# Patient Record
Sex: MALE | Race: WHITE | NOT HISPANIC OR LATINO | Employment: OTHER | URBAN - METROPOLITAN AREA
[De-identification: names, ages, dates, MRNs, and addresses within clinical notes are randomized per-mention and may not be internally consistent; named-entity substitution may affect disease eponyms.]

---

## 2022-08-09 ENCOUNTER — APPOINTMENT (OUTPATIENT)
Dept: GENERAL RADIOLOGY | Facility: HOSPITAL | Age: 87
End: 2022-08-09

## 2022-08-09 ENCOUNTER — HOSPITAL ENCOUNTER (INPATIENT)
Facility: HOSPITAL | Age: 87
LOS: 11 days | Discharge: HOME HEALTH | End: 2022-08-20
Attending: INTERNAL MEDICINE | Admitting: INTERNAL MEDICINE

## 2022-08-09 DIAGNOSIS — J96.01 ACUTE HYPOXEMIC RESPIRATORY FAILURE DUE TO COVID-19: Primary | ICD-10-CM

## 2022-08-09 DIAGNOSIS — U07.1 ACUTE HYPOXEMIC RESPIRATORY FAILURE DUE TO COVID-19: Primary | ICD-10-CM

## 2022-08-09 DIAGNOSIS — N17.9 ACUTE RENAL FAILURE, UNSPECIFIED ACUTE RENAL FAILURE TYPE: ICD-10-CM

## 2022-08-09 PROBLEM — E87.20 METABOLIC ACIDOSIS: Status: ACTIVE | Noted: 2022-08-08

## 2022-08-09 PROBLEM — R19.7 DIARRHEA OF PRESUMED INFECTIOUS ORIGIN: Status: ACTIVE | Noted: 2022-08-09

## 2022-08-09 PROBLEM — J10.1 INFLUENZA DUE TO INFLUENZA VIRUS, TYPE B: Status: RESOLVED | Noted: 2022-08-08 | Resolved: 2022-08-09

## 2022-08-09 PROBLEM — K92.1 HEMATOCHEZIA: Status: ACTIVE | Noted: 2022-08-08

## 2022-08-09 PROBLEM — R77.8 ELEVATED TROPONIN: Status: ACTIVE | Noted: 2022-08-09

## 2022-08-09 PROBLEM — J10.1 INFLUENZA DUE TO INFLUENZA VIRUS, TYPE B: Status: ACTIVE | Noted: 2022-08-08

## 2022-08-09 LAB
ADV 40+41 DNA STL QL NAA+NON-PROBE: NOT DETECTED
ALBUMIN SERPL-MCNC: 3.7 G/DL (ref 3.5–5.2)
ALBUMIN/GLOB SERPL: 1.4 G/DL
ALP SERPL-CCNC: 80 U/L (ref 39–117)
ALT SERPL W P-5'-P-CCNC: 36 U/L (ref 1–41)
ANION GAP SERPL CALCULATED.3IONS-SCNC: 12 MMOL/L (ref 5–15)
ANION GAP SERPL CALCULATED.3IONS-SCNC: 16 MMOL/L (ref 5–15)
APTT PPP: 32.9 SECONDS (ref 24–31)
AST SERPL-CCNC: 44 U/L (ref 1–40)
ASTRO TYP 1-8 RNA STL QL NAA+NON-PROBE: NOT DETECTED
B PARAPERT DNA SPEC QL NAA+PROBE: NOT DETECTED
B PERT DNA SPEC QL NAA+PROBE: NOT DETECTED
BACTERIA UR QL AUTO: ABNORMAL /HPF
BASOPHILS # BLD AUTO: 0 10*3/MM3 (ref 0–0.2)
BASOPHILS NFR BLD AUTO: 0.3 % (ref 0–1.5)
BILIRUB SERPL-MCNC: 0.3 MG/DL (ref 0–1.2)
BILIRUB UR QL STRIP: NEGATIVE
BUN SERPL-MCNC: 95 MG/DL (ref 8–23)
BUN SERPL-MCNC: 95 MG/DL (ref 8–23)
BUN/CREAT SERPL: 21.4 (ref 7–25)
BUN/CREAT SERPL: 23.3 (ref 7–25)
C CAYETANENSIS DNA STL QL NAA+NON-PROBE: NOT DETECTED
C COLI+JEJ+UPSA DNA STL QL NAA+NON-PROBE: NOT DETECTED
C DIFF GDH STL QL: NEGATIVE
C PNEUM DNA NPH QL NAA+NON-PROBE: NOT DETECTED
CA-I SERPL ISE-MCNC: 1.21 MMOL/L (ref 1.2–1.3)
CALCIUM SPEC-SCNC: 8.4 MG/DL (ref 8.6–10.5)
CALCIUM SPEC-SCNC: 8.6 MG/DL (ref 8.6–10.5)
CHLORIDE SERPL-SCNC: 107 MMOL/L (ref 98–107)
CHLORIDE SERPL-SCNC: 111 MMOL/L (ref 98–107)
CHOLEST SERPL-MCNC: 133 MG/DL (ref 0–200)
CK SERPL-CCNC: 168 U/L (ref 20–200)
CLARITY UR: CLEAR
CO2 SERPL-SCNC: 16 MMOL/L (ref 22–29)
CO2 SERPL-SCNC: 19 MMOL/L (ref 22–29)
COLOR UR: YELLOW
CREAT SERPL-MCNC: 4.08 MG/DL (ref 0.76–1.27)
CREAT SERPL-MCNC: 4.44 MG/DL (ref 0.76–1.27)
CRP SERPL-MCNC: 2.67 MG/DL (ref 0–0.5)
CRYPTOSP DNA STL QL NAA+NON-PROBE: NOT DETECTED
D-LACTATE SERPL-SCNC: 0.8 MMOL/L (ref 0.5–2)
D-LACTATE SERPL-SCNC: 1.3 MMOL/L (ref 0.5–2)
DEPRECATED RDW RBC AUTO: 47.7 FL (ref 37–54)
E HISTOLYT DNA STL QL NAA+NON-PROBE: NOT DETECTED
EAEC PAA PLAS AGGR+AATA ST NAA+NON-PRB: NOT DETECTED
EC STX1+STX2 GENES STL QL NAA+NON-PROBE: NOT DETECTED
EGFRCR SERPLBLD CKD-EPI 2021: 12.3 ML/MIN/1.73
EGFRCR SERPLBLD CKD-EPI 2021: 13.6 ML/MIN/1.73
EOSINOPHIL # BLD AUTO: 0 10*3/MM3 (ref 0–0.4)
EOSINOPHIL NFR BLD AUTO: 0 % (ref 0.3–6.2)
EPEC EAE GENE STL QL NAA+NON-PROBE: NOT DETECTED
ERYTHROCYTE [DISTWIDTH] IN BLOOD BY AUTOMATED COUNT: 14.7 % (ref 12.3–15.4)
ETEC LTA+ST1A+ST1B TOX ST NAA+NON-PROBE: NOT DETECTED
FLUAV SUBTYP SPEC NAA+PROBE: NOT DETECTED
FLUBV RNA ISLT QL NAA+PROBE: NOT DETECTED
G LAMBLIA DNA STL QL NAA+NON-PROBE: NOT DETECTED
GLOBULIN UR ELPH-MCNC: 2.7 GM/DL
GLUCOSE BLDC GLUCOMTR-MCNC: 198 MG/DL (ref 70–105)
GLUCOSE BLDC GLUCOMTR-MCNC: 217 MG/DL (ref 70–105)
GLUCOSE BLDC GLUCOMTR-MCNC: 285 MG/DL (ref 70–105)
GLUCOSE BLDC GLUCOMTR-MCNC: 291 MG/DL (ref 70–105)
GLUCOSE SERPL-MCNC: 265 MG/DL (ref 65–99)
GLUCOSE SERPL-MCNC: 316 MG/DL (ref 65–99)
GLUCOSE UR STRIP-MCNC: ABNORMAL MG/DL
HADV DNA SPEC NAA+PROBE: NOT DETECTED
HCOV 229E RNA SPEC QL NAA+PROBE: NOT DETECTED
HCOV HKU1 RNA SPEC QL NAA+PROBE: NOT DETECTED
HCOV NL63 RNA SPEC QL NAA+PROBE: NOT DETECTED
HCOV OC43 RNA SPEC QL NAA+PROBE: NOT DETECTED
HCT VFR BLD AUTO: 35.3 % (ref 37.5–51)
HDLC SERPL-MCNC: 24 MG/DL (ref 40–60)
HGB BLD-MCNC: 11.7 G/DL (ref 13–17.7)
HGB UR QL STRIP.AUTO: ABNORMAL
HMPV RNA NPH QL NAA+NON-PROBE: NOT DETECTED
HPIV1 RNA ISLT QL NAA+PROBE: NOT DETECTED
HPIV2 RNA SPEC QL NAA+PROBE: NOT DETECTED
HPIV3 RNA NPH QL NAA+PROBE: NOT DETECTED
HPIV4 P GENE NPH QL NAA+PROBE: NOT DETECTED
HYALINE CASTS UR QL AUTO: ABNORMAL /LPF
INR PPP: 1.21 (ref 0.93–1.1)
KETONES UR QL STRIP: NEGATIVE
LDLC SERPL CALC-MCNC: 72 MG/DL (ref 0–100)
LDLC/HDLC SERPL: 2.7 {RATIO}
LEUKOCYTE ESTERASE UR QL STRIP.AUTO: ABNORMAL
LYMPHOCYTES # BLD AUTO: 0.6 10*3/MM3 (ref 0.7–3.1)
LYMPHOCYTES NFR BLD AUTO: 11.4 % (ref 19.6–45.3)
M PNEUMO IGG SER IA-ACNC: NOT DETECTED
MAGNESIUM SERPL-MCNC: 1.8 MG/DL (ref 1.6–2.4)
MCH RBC QN AUTO: 30.3 PG (ref 26.6–33)
MCHC RBC AUTO-ENTMCNC: 33.1 G/DL (ref 31.5–35.7)
MCV RBC AUTO: 91.4 FL (ref 79–97)
MONOCYTES # BLD AUTO: 0.2 10*3/MM3 (ref 0.1–0.9)
MONOCYTES NFR BLD AUTO: 3.7 % (ref 5–12)
MRSA DNA SPEC QL NAA+PROBE: ABNORMAL
NEUTROPHILS NFR BLD AUTO: 4.6 10*3/MM3 (ref 1.7–7)
NEUTROPHILS NFR BLD AUTO: 84.6 % (ref 42.7–76)
NITRITE UR QL STRIP: NEGATIVE
NOROVIRUS GI+II RNA STL QL NAA+NON-PROBE: NOT DETECTED
NRBC BLD AUTO-RTO: 0.1 /100 WBC (ref 0–0.2)
P SHIGELLOIDES DNA STL QL NAA+NON-PROBE: NOT DETECTED
PH UR STRIP.AUTO: <=5 [PH] (ref 5–8)
PHOSPHATE SERPL-MCNC: 5 MG/DL (ref 2.5–4.5)
PLATELET # BLD AUTO: 196 10*3/MM3 (ref 140–450)
PMV BLD AUTO: 9.6 FL (ref 6–12)
POTASSIUM SERPL-SCNC: 4.6 MMOL/L (ref 3.5–5.2)
POTASSIUM SERPL-SCNC: 5 MMOL/L (ref 3.5–5.2)
PROCALCITONIN SERPL-MCNC: 0.28 NG/ML (ref 0–0.25)
PROT SERPL-MCNC: 6.4 G/DL (ref 6–8.5)
PROT UR QL STRIP: ABNORMAL
PROTHROMBIN TIME: 12.3 SECONDS (ref 9.6–11.7)
RBC # BLD AUTO: 3.86 10*6/MM3 (ref 4.14–5.8)
RBC # UR STRIP: ABNORMAL /HPF
REF LAB TEST METHOD: ABNORMAL
RHINOVIRUS RNA SPEC NAA+PROBE: NOT DETECTED
RSV RNA NPH QL NAA+NON-PROBE: NOT DETECTED
RVA RNA STL QL NAA+NON-PROBE: NOT DETECTED
S ENT+BONG DNA STL QL NAA+NON-PROBE: NOT DETECTED
SAPO I+II+IV+V RNA STL QL NAA+NON-PROBE: NOT DETECTED
SARS-COV-2 RNA NPH QL NAA+NON-PROBE: DETECTED
SHIGELLA SP+EIEC IPAH ST NAA+NON-PROBE: NOT DETECTED
SODIUM SERPL-SCNC: 139 MMOL/L (ref 136–145)
SODIUM SERPL-SCNC: 142 MMOL/L (ref 136–145)
SODIUM UR-SCNC: 71 MMOL/L
SP GR UR STRIP: 1.01 (ref 1–1.03)
SQUAMOUS #/AREA URNS HPF: ABNORMAL /HPF
TRIGL SERPL-MCNC: 221 MG/DL (ref 0–150)
TROPONIN T SERPL-MCNC: 0.05 NG/ML (ref 0–0.03)
TROPONIN T SERPL-MCNC: 0.06 NG/ML (ref 0–0.03)
TROPONIN T SERPL-MCNC: 0.07 NG/ML (ref 0–0.03)
UROBILINOGEN UR QL STRIP: ABNORMAL
V CHOL+PARA+VUL DNA STL QL NAA+NON-PROBE: NOT DETECTED
V CHOLERAE DNA STL QL NAA+NON-PROBE: NOT DETECTED
VLDLC SERPL-MCNC: 37 MG/DL (ref 5–40)
WBC # UR STRIP: ABNORMAL /HPF
WBC NRBC COR # BLD: 5.5 10*3/MM3 (ref 3.4–10.8)
Y ENTEROCOL DNA STL QL NAA+NON-PROBE: NOT DETECTED

## 2022-08-09 PROCEDURE — 0202U NFCT DS 22 TRGT SARS-COV-2: CPT | Performed by: NURSE PRACTITIONER

## 2022-08-09 PROCEDURE — 84300 ASSAY OF URINE SODIUM: CPT | Performed by: NURSE PRACTITIONER

## 2022-08-09 PROCEDURE — 85025 COMPLETE CBC W/AUTO DIFF WBC: CPT | Performed by: NURSE PRACTITIONER

## 2022-08-09 PROCEDURE — 87040 BLOOD CULTURE FOR BACTERIA: CPT | Performed by: NURSE PRACTITIONER

## 2022-08-09 PROCEDURE — 85610 PROTHROMBIN TIME: CPT | Performed by: NURSE PRACTITIONER

## 2022-08-09 PROCEDURE — 84100 ASSAY OF PHOSPHORUS: CPT | Performed by: NURSE PRACTITIONER

## 2022-08-09 PROCEDURE — 83605 ASSAY OF LACTIC ACID: CPT | Performed by: INTERNAL MEDICINE

## 2022-08-09 PROCEDURE — 80061 LIPID PANEL: CPT | Performed by: NURSE PRACTITIONER

## 2022-08-09 PROCEDURE — 82962 GLUCOSE BLOOD TEST: CPT

## 2022-08-09 PROCEDURE — 83605 ASSAY OF LACTIC ACID: CPT | Performed by: NURSE PRACTITIONER

## 2022-08-09 PROCEDURE — 87449 NOS EACH ORGANISM AG IA: CPT | Performed by: NURSE PRACTITIONER

## 2022-08-09 PROCEDURE — 84145 PROCALCITONIN (PCT): CPT | Performed by: NURSE PRACTITIONER

## 2022-08-09 PROCEDURE — 71045 X-RAY EXAM CHEST 1 VIEW: CPT

## 2022-08-09 PROCEDURE — 85730 THROMBOPLASTIN TIME PARTIAL: CPT | Performed by: NURSE PRACTITIONER

## 2022-08-09 PROCEDURE — 84484 ASSAY OF TROPONIN QUANT: CPT | Performed by: NURSE PRACTITIONER

## 2022-08-09 PROCEDURE — 82330 ASSAY OF CALCIUM: CPT | Performed by: NURSE PRACTITIONER

## 2022-08-09 PROCEDURE — 63710000001 INSULIN LISPRO (HUMAN) PER 5 UNITS: Performed by: INTERNAL MEDICINE

## 2022-08-09 PROCEDURE — 87324 CLOSTRIDIUM AG IA: CPT | Performed by: NURSE PRACTITIONER

## 2022-08-09 PROCEDURE — 82550 ASSAY OF CK (CPK): CPT | Performed by: NURSE PRACTITIONER

## 2022-08-09 PROCEDURE — 99233 SBSQ HOSP IP/OBS HIGH 50: CPT | Performed by: NURSE PRACTITIONER

## 2022-08-09 PROCEDURE — 63710000001 INSULIN LISPRO (HUMAN) PER 5 UNITS: Performed by: NURSE PRACTITIONER

## 2022-08-09 PROCEDURE — 87641 MR-STAPH DNA AMP PROBE: CPT | Performed by: NURSE PRACTITIONER

## 2022-08-09 PROCEDURE — 63710000001 DEXAMETHASONE PER 0.25 MG: Performed by: INTERNAL MEDICINE

## 2022-08-09 PROCEDURE — 83735 ASSAY OF MAGNESIUM: CPT | Performed by: NURSE PRACTITIONER

## 2022-08-09 PROCEDURE — 80053 COMPREHEN METABOLIC PANEL: CPT | Performed by: NURSE PRACTITIONER

## 2022-08-09 PROCEDURE — 93005 ELECTROCARDIOGRAM TRACING: CPT | Performed by: NURSE PRACTITIONER

## 2022-08-09 PROCEDURE — 93010 ELECTROCARDIOGRAM REPORT: CPT | Performed by: INTERNAL MEDICINE

## 2022-08-09 PROCEDURE — 81001 URINALYSIS AUTO W/SCOPE: CPT | Performed by: NURSE PRACTITIONER

## 2022-08-09 PROCEDURE — 87507 IADNA-DNA/RNA PROBE TQ 12-25: CPT | Performed by: NURSE PRACTITIONER

## 2022-08-09 PROCEDURE — 25010000002 ENOXAPARIN PER 10 MG: Performed by: NURSE PRACTITIONER

## 2022-08-09 PROCEDURE — 86140 C-REACTIVE PROTEIN: CPT | Performed by: NURSE PRACTITIONER

## 2022-08-09 RX ORDER — HYDROCODONE BITARTRATE AND ACETAMINOPHEN 5; 325 MG/1; MG/1
1 TABLET ORAL 3 TIMES DAILY PRN
COMMUNITY

## 2022-08-09 RX ORDER — PANTOPRAZOLE SODIUM 40 MG/1
40 TABLET, DELAYED RELEASE ORAL
Status: DISCONTINUED | OUTPATIENT
Start: 2022-08-10 | End: 2022-08-20 | Stop reason: HOSPADM

## 2022-08-09 RX ORDER — SODIUM CHLORIDE 0.9 % (FLUSH) 0.9 %
10 SYRINGE (ML) INJECTION EVERY 12 HOURS SCHEDULED
Status: DISCONTINUED | OUTPATIENT
Start: 2022-08-09 | End: 2022-08-20 | Stop reason: HOSPADM

## 2022-08-09 RX ORDER — SODIUM CHLORIDE 0.9 % (FLUSH) 0.9 %
10 SYRINGE (ML) INJECTION AS NEEDED
Status: DISCONTINUED | OUTPATIENT
Start: 2022-08-09 | End: 2022-08-20 | Stop reason: HOSPADM

## 2022-08-09 RX ORDER — NITROGLYCERIN 0.4 MG/1
0.4 TABLET SUBLINGUAL
Status: DISCONTINUED | OUTPATIENT
Start: 2022-08-09 | End: 2022-08-20 | Stop reason: HOSPADM

## 2022-08-09 RX ORDER — ONDANSETRON 4 MG/1
4 TABLET, FILM COATED ORAL EVERY 6 HOURS PRN
Status: DISCONTINUED | OUTPATIENT
Start: 2022-08-09 | End: 2022-08-20 | Stop reason: HOSPADM

## 2022-08-09 RX ORDER — LISINOPRIL 5 MG/1
5 TABLET ORAL DAILY
COMMUNITY
End: 2022-08-20 | Stop reason: HOSPADM

## 2022-08-09 RX ORDER — INSULIN LISPRO 100 [IU]/ML
0-9 INJECTION, SOLUTION INTRAVENOUS; SUBCUTANEOUS
Status: DISCONTINUED | OUTPATIENT
Start: 2022-08-09 | End: 2022-08-09

## 2022-08-09 RX ORDER — PREGABALIN 150 MG/1
150 CAPSULE ORAL 3 TIMES DAILY
COMMUNITY

## 2022-08-09 RX ORDER — INSULIN LISPRO 100 [IU]/ML
0-7 INJECTION, SOLUTION INTRAVENOUS; SUBCUTANEOUS EVERY 6 HOURS SCHEDULED
Status: DISCONTINUED | OUTPATIENT
Start: 2022-08-09 | End: 2022-08-09

## 2022-08-09 RX ORDER — SERTRALINE HYDROCHLORIDE 100 MG/1
100 TABLET, FILM COATED ORAL DAILY
COMMUNITY

## 2022-08-09 RX ORDER — ACETAMINOPHEN 650 MG/1
650 SUPPOSITORY RECTAL EVERY 4 HOURS PRN
Status: DISCONTINUED | OUTPATIENT
Start: 2022-08-09 | End: 2022-08-20 | Stop reason: HOSPADM

## 2022-08-09 RX ORDER — FENOFIBRATE 48 MG/1
48 TABLET, COATED ORAL DAILY
Status: DISCONTINUED | OUTPATIENT
Start: 2022-08-09 | End: 2022-08-15 | Stop reason: CLARIF

## 2022-08-09 RX ORDER — ENOXAPARIN SODIUM 100 MG/ML
30 INJECTION SUBCUTANEOUS
Status: DISCONTINUED | OUTPATIENT
Start: 2022-08-09 | End: 2022-08-20 | Stop reason: HOSPADM

## 2022-08-09 RX ORDER — ACETAMINOPHEN 325 MG/1
650 TABLET ORAL EVERY 4 HOURS PRN
Status: DISCONTINUED | OUTPATIENT
Start: 2022-08-09 | End: 2022-08-20 | Stop reason: HOSPADM

## 2022-08-09 RX ORDER — LISINOPRIL 5 MG/1
5 TABLET ORAL DAILY
Status: DISCONTINUED | OUTPATIENT
Start: 2022-08-09 | End: 2022-08-09

## 2022-08-09 RX ORDER — INSULIN LISPRO 100 [IU]/ML
0-14 INJECTION, SOLUTION INTRAVENOUS; SUBCUTANEOUS
Status: DISCONTINUED | OUTPATIENT
Start: 2022-08-09 | End: 2022-08-20 | Stop reason: HOSPADM

## 2022-08-09 RX ORDER — DEXTROSE MONOHYDRATE 25 G/50ML
25 INJECTION, SOLUTION INTRAVENOUS
Status: DISCONTINUED | OUTPATIENT
Start: 2022-08-09 | End: 2022-08-09

## 2022-08-09 RX ORDER — OLANZAPINE 10 MG/2ML
1 INJECTION, POWDER, LYOPHILIZED, FOR SOLUTION INTRAMUSCULAR
Status: DISCONTINUED | OUTPATIENT
Start: 2022-08-09 | End: 2022-08-09

## 2022-08-09 RX ORDER — NICOTINE POLACRILEX 4 MG
15 LOZENGE BUCCAL
Status: DISCONTINUED | OUTPATIENT
Start: 2022-08-09 | End: 2022-08-20 | Stop reason: HOSPADM

## 2022-08-09 RX ORDER — PREGABALIN 75 MG/1
150 CAPSULE ORAL 3 TIMES DAILY
Status: DISCONTINUED | OUTPATIENT
Start: 2022-08-09 | End: 2022-08-20 | Stop reason: HOSPADM

## 2022-08-09 RX ORDER — ALUMINA, MAGNESIA, AND SIMETHICONE 2400; 2400; 240 MG/30ML; MG/30ML; MG/30ML
15 SUSPENSION ORAL EVERY 6 HOURS PRN
Status: DISCONTINUED | OUTPATIENT
Start: 2022-08-09 | End: 2022-08-20 | Stop reason: HOSPADM

## 2022-08-09 RX ORDER — DEXTROSE MONOHYDRATE 25 G/50ML
25 INJECTION, SOLUTION INTRAVENOUS
Status: DISCONTINUED | OUTPATIENT
Start: 2022-08-09 | End: 2022-08-20 | Stop reason: HOSPADM

## 2022-08-09 RX ORDER — NICOTINE POLACRILEX 4 MG
15 LOZENGE BUCCAL
Status: DISCONTINUED | OUTPATIENT
Start: 2022-08-09 | End: 2022-08-09

## 2022-08-09 RX ORDER — FUROSEMIDE 40 MG/1
40 TABLET ORAL DAILY
COMMUNITY
End: 2022-08-20 | Stop reason: HOSPADM

## 2022-08-09 RX ORDER — OLANZAPINE 10 MG/2ML
1 INJECTION, POWDER, LYOPHILIZED, FOR SOLUTION INTRAMUSCULAR
Status: DISCONTINUED | OUTPATIENT
Start: 2022-08-09 | End: 2022-08-20 | Stop reason: HOSPADM

## 2022-08-09 RX ORDER — DEXAMETHASONE 4 MG/1
6 TABLET ORAL
Status: DISCONTINUED | OUTPATIENT
Start: 2022-08-09 | End: 2022-08-12

## 2022-08-09 RX ORDER — FUROSEMIDE 40 MG/1
40 TABLET ORAL DAILY
Status: DISCONTINUED | OUTPATIENT
Start: 2022-08-09 | End: 2022-08-09

## 2022-08-09 RX ORDER — SERTRALINE HYDROCHLORIDE 100 MG/1
100 TABLET, FILM COATED ORAL DAILY
Status: DISCONTINUED | OUTPATIENT
Start: 2022-08-09 | End: 2022-08-20 | Stop reason: HOSPADM

## 2022-08-09 RX ORDER — HYDROCODONE BITARTRATE AND ACETAMINOPHEN 5; 325 MG/1; MG/1
1 TABLET ORAL EVERY 8 HOURS PRN
Status: DISCONTINUED | OUTPATIENT
Start: 2022-08-09 | End: 2022-08-20 | Stop reason: HOSPADM

## 2022-08-09 RX ORDER — TRAZODONE HYDROCHLORIDE 50 MG/1
50 TABLET ORAL NIGHTLY
Status: DISCONTINUED | OUTPATIENT
Start: 2022-08-09 | End: 2022-08-20 | Stop reason: HOSPADM

## 2022-08-09 RX ORDER — OMEPRAZOLE 20 MG/1
20 CAPSULE, DELAYED RELEASE ORAL DAILY
COMMUNITY
End: 2022-08-20 | Stop reason: HOSPADM

## 2022-08-09 RX ORDER — FAMOTIDINE 10 MG/ML
20 INJECTION, SOLUTION INTRAVENOUS DAILY
Status: DISCONTINUED | OUTPATIENT
Start: 2022-08-09 | End: 2022-08-09

## 2022-08-09 RX ORDER — TRAZODONE HYDROCHLORIDE 50 MG/1
50 TABLET ORAL NIGHTLY
COMMUNITY

## 2022-08-09 RX ORDER — FENOFIBRATE 145 MG/1
145 TABLET, COATED ORAL DAILY
COMMUNITY
End: 2022-08-20 | Stop reason: HOSPADM

## 2022-08-09 RX ORDER — ONDANSETRON 2 MG/ML
4 INJECTION INTRAMUSCULAR; INTRAVENOUS EVERY 6 HOURS PRN
Status: DISCONTINUED | OUTPATIENT
Start: 2022-08-09 | End: 2022-08-20 | Stop reason: HOSPADM

## 2022-08-09 RX ORDER — GLIMEPIRIDE 1 MG/1
1 TABLET ORAL
COMMUNITY

## 2022-08-09 RX ADMIN — Medication 10 ML: at 20:37

## 2022-08-09 RX ADMIN — SODIUM BICARBONATE: 84 INJECTION, SOLUTION INTRAVENOUS at 09:48

## 2022-08-09 RX ADMIN — INSULIN LISPRO 6 UNITS: 100 INJECTION, SOLUTION INTRAVENOUS; SUBCUTANEOUS at 11:46

## 2022-08-09 RX ADMIN — SODIUM BICARBONATE: 84 INJECTION, SOLUTION INTRAVENOUS at 14:56

## 2022-08-09 RX ADMIN — DEXAMETHASONE 6 MG: 4 TABLET ORAL at 11:46

## 2022-08-09 RX ADMIN — SODIUM BICARBONATE 150 MEQ: 84 INJECTION, SOLUTION INTRAVENOUS at 05:40

## 2022-08-09 RX ADMIN — PREGABALIN 150 MG: 75 CAPSULE ORAL at 20:37

## 2022-08-09 RX ADMIN — TRAZODONE HYDROCHLORIDE 50 MG: 50 TABLET ORAL at 20:37

## 2022-08-09 RX ADMIN — SERTRALINE 100 MG: 100 TABLET, FILM COATED ORAL at 11:46

## 2022-08-09 RX ADMIN — PREGABALIN 150 MG: 75 CAPSULE ORAL at 11:46

## 2022-08-09 RX ADMIN — FAMOTIDINE 20 MG: 10 INJECTION INTRAVENOUS at 08:05

## 2022-08-09 RX ADMIN — INSULIN LISPRO 6 UNITS: 100 INJECTION, SOLUTION INTRAVENOUS; SUBCUTANEOUS at 08:11

## 2022-08-09 RX ADMIN — SODIUM BICARBONATE: 84 INJECTION, SOLUTION INTRAVENOUS at 21:31

## 2022-08-09 RX ADMIN — INSULIN LISPRO 5 UNITS: 100 INJECTION, SOLUTION INTRAVENOUS; SUBCUTANEOUS at 16:33

## 2022-08-09 RX ADMIN — INSULIN LISPRO 5 UNITS: 100 INJECTION, SOLUTION INTRAVENOUS; SUBCUTANEOUS at 05:40

## 2022-08-09 RX ADMIN — FENOFIBRATE 48 MG: 48 TABLET ORAL at 14:59

## 2022-08-09 RX ADMIN — PREGABALIN 150 MG: 75 CAPSULE ORAL at 15:00

## 2022-08-09 RX ADMIN — ENOXAPARIN SODIUM 30 MG: 100 INJECTION SUBCUTANEOUS at 17:31

## 2022-08-09 RX ADMIN — INSULIN LISPRO 3 UNITS: 100 INJECTION, SOLUTION INTRAVENOUS; SUBCUTANEOUS at 20:37

## 2022-08-09 RX ADMIN — Medication 10 ML: at 08:06

## 2022-08-09 NOTE — PLAN OF CARE
Pt states he's feeling much better. Down to 1L NC (was on 5L NC at start of shift). Remains on bicarb gtt. Continues to have watery diarrhea, c-diff was negative. Great urine output. PCU overflow.

## 2022-08-09 NOTE — CASE MANAGEMENT/SOCIAL WORK
Discharge Planning Assessment  [facility]     Patient Name: Shalom Alexis  MRN: [id number]  Today's Date: 8/9/2022    Admit Date: 8/9/2022     Discharge Needs Assessment     Row Name 08/09/22 1447       Living Environment    People in Home child(true), adult    Name(s) of People in Home Rose Marie - daughter    Current Living Arrangements apartment    Primary Care Provided by self    Provides Primary Care For no one    Family Caregiver if Needed child(true), adult    Family Caregiver Names Daughter - Rose Marie    Quality of Family Relationships supportive;helpful;involved    Able to Return to Prior Arrangements yes       Resource/Environmental Concerns    Resource/Environmental Concerns none    Transportation Concerns none       Transition Planning    Patient/Family Anticipates Transition to home with family    Patient/Family Anticipated Services at Transition none    Transportation Anticipated family or friend will provide       Discharge Needs Assessment    Readmission Within the Last 30 Days no previous admission in last 30 days    Equipment Currently Used at Home cane, straight;wheelchair    Concerns to be Addressed denies needs/concerns at this time    Anticipated Changes Related to Illness none    Equipment Needed After Discharge none    Discharge Facility/Level of Care Needs home with home health    Provided Post Acute Provider List? N/A    Provided Post Acute Provider Quality & Resource List? N/A               Discharge Plan     Row Name 08/09/22 1449       Plan    Plan DC Plan: Anticipate Routine Home with Family pending PT/OT recommendations. Covid + 8/9/22.    Provided Post Acute Provider List? N/A    Provided Post Acute Provider Quality & Resource List? N/A    Plan Comments CM spoke with patient daughter Rose Marie via telephone to discuss admission assessment and discharge planning. Rose Marie confirms PCP and pharmacy. Rose Marie denies any difficulty affording medications at this time. Rose Marie denies any additional needs for services  or DME at this time. Rose Marie states patient is mostly independent prior to this illness. Rose Marie states that his spouse Marialuisa was just taken to the hospital in [facility] as well and was also positive for Covid. Rose Marie states that while the two are still , they live in separate apartments and Rose Marie lives with the patient. IMM delivered. Rose Marie has declined meds to bed services. Rose Marie states she will be able to transport patient home when he is ready for discharge. Patient downgraded to PCU level of care.CM will continue to follow for any additional needs that may develop and adjust discharge plan accordingly. DC Barriers: Bicarb gtt, and abnormal labs.              Expected Discharge Date and Time     Expected Discharge Date Expected Discharge Time    Aug 15, 2022          Demographic Summary     Row Name 08/09/22 1445       General Information    Admission Type inpatient    Arrived From emergency department;home    Required Notices Provided Important Message from Medicare    Referral Source admission list    Reason for Consult discharge planning    Preferred Language English       Contact Information    Permission Granted to Share Info With                Functional Status     Row Name 08/09/22 1445       Functional Status    Usual Activity Tolerance good    Current Activity Tolerance other (see comments)  CHRISSY    Functional Status Comments All admission information obtained from patients daughter Rose Marie who lives with him. Patient in covid isolation and hard of hearing.       Functional Status, IADL    Medications independent    Meal Preparation independent    Housekeeping independent    Laundry independent    Shopping independent       Mental Status    General Appearance WDL WDL       Mental Status Summary    Recent Changes in Mental Status/Cognitive Functioning no changes       Employment/    Employment Status retired    Current or Previous Occupation not applicable              Phone  communication or documentation only- no physical contact with patient or family.        Bailey Alvarez RN      Office Phone: [phone]  Office Cell:     [phone]

## 2022-08-09 NOTE — CONSULTS
Nephrology Consult Note                                                [facility]      Patient Identification:  Name: Shalom Alexis  Age: 87 y.o.  Sex: male  :  1935  MRN: [id number]               Requesting Physician: Tucker Gonzalez MD  Reason for Consultation: management recommendations      History of Present Illness:    Patient is 87-year-old white male patient who initially presented to our urgent care because of not feeling good and having abdominal discomfort where he was sent to the emergency room of [facility] where he was found to have COVID and acute kidney injury with a creatinine 5.4 potassium 5.1 with metabolic acidosis CO2 14 was transferred to [facility] for further evaluation and I was consulted to manage acute kidney injury hyperkalemia  Problem List:  Patient Active Problem List   Diagnosis   • Lab test positive for detection of COVID-19 virus   • Influenza due to influenza virus, type B   • Diarrhea of presumed infectious origin   • Hematochezia   • JOSEMANUEL (acute kidney injury)   • Metabolic acidosis   • Acute renal failure (ARF) (HCC)   • CAD (coronary artery disease)   • History of MI (myocardial infarction)   • [place] (hard of hearing)     Past Medical History:  Past Medical History:   Diagnosis Date   • CAD (coronary artery disease)    • COPD (chronic obstructive pulmonary disease) (HCC)    • Diabetes mellitus (HCC)    • History of MI (myocardial infarction)    • [place] (hard of hearing)    • Hypertension      Past Surgical History:  Past Surgical History:   Procedure Laterality Date   • BACK SURGERY        Home Meds:  No medications prior to admission.     Current Meds:     Current Facility-Administered Medications:   •  acetaminophen (TYLENOL) tablet 650 mg, 650 mg, Oral, Q4H PRN **OR** acetaminophen (TYLENOL) suppository 650 mg, 650 mg, Rectal, Q4H PRN, Josef Chong APRN  •  aluminum-magnesium hydroxide-simethicone (MAALOX MAX)  400-400-40 MG/5ML suspension 15 mL, 15 mL, Oral, Q6H PRN, Josef Chong E, APRN  •  dextrose (D50W) (25 g/50 mL) IV injection 25 g, 25 g, Intravenous, Q15 Min PRN, Josef Chong, APRN  •  dextrose (GLUTOSE) oral gel 15 g, 15 g, Oral, Q15 Min PRN, Josef Chong E, APRN  •  famotidine (PEPCID) injection 20 mg, 20 mg, Intravenous, Daily, Josef Chong, APRN  •  glucagon (human recombinant) (GLUCAGEN DIAGNOSTIC) 1 mg in sterile water (preservative free) 1 mL injection, 1 mg, Intramuscular, Q15 Min PRN, Josef Chong, APRN  •  insulin lispro (ADMELOG) injection 0-7 Units, 0-7 Units, Subcutaneous, Q6H, Josef Chong, APRN  •  nitroglycerin (NITROSTAT) SL tablet 0.4 mg, 0.4 mg, Sublingual, Q5 Min PRN, Josef Chong, APRN  •  ondansetron (ZOFRAN) tablet 4 mg, 4 mg, Oral, Q6H PRN **OR** ondansetron (ZOFRAN) injection 4 mg, 4 mg, Intravenous, Q6H PRN, Josef Chong, APRN  •  sodium bicarbonate 8.4 % 150 mEq in dextrose (D5W) 5 % 1,000 mL infusion (greater than 75 mEq), 150 mEq, Intravenous, Continuous, Josef Chong, APRN  •  sodium chloride 0.9 % flush 10 mL, 10 mL, Intravenous, Q12H, Josef Chong, APRN  •  sodium chloride 0.9 % flush 10 mL, 10 mL, Intravenous, PRN, Josef Chong, APRN    Allergies:  No Known Allergies  Social History:   Social History     Tobacco Use   • Smoking status: Former Smoker   • Smokeless tobacco: Never Used   Substance Use Topics   • Alcohol use: Not Currently      Family History:  History reviewed. No pertinent family history.     Review of Systems  Reviewed 12 systems were reviewed, all negative except for those mentioned in HPI    Objective:  Vitals:   /72   Pulse 92   Wt 99.9 kg (220 lb 3.8 oz)   SpO2 97%   I/O:   No intake or output data in the 24 hours ending 08/09/22 8171    Exam:  General Appearance: Hard of hearing  Head:  Normocephalic, without obvious abnormality, atraumatic  Eyes:  PERRL, conjunctiva/corneas clear     Neck:  Supple,  no adenopathy;       Lungs:  Decreased BS occasion ronchi  Heart:  Regular rate and rhythm, S1 and S2 normal  Abdomen:  Soft, non-tender, bowel sounds active   Extremities: trace edema  Pulses: 2+ and symmetric all extremities  Skin:  No rashes or lesions  Data Review:  All labs (24hrs):   Recent Results (from the past 24 hour(s))   Lipid Panel    Collection Time: 08/09/22  4:22 AM    Specimen: Blood   Result Value Ref Range    Total Cholesterol 133 0 - 200 mg/dL    Triglycerides 221 (H) 0 - 150 mg/dL    HDL Cholesterol 24 (L) 40 - 60 mg/dL    LDL Cholesterol  72 0 - 100 mg/dL    VLDL Cholesterol 37 5 - 40 mg/dL    LDL/HDL Ratio 2.70    Lactic Acid, Plasma    Collection Time: 08/09/22  4:22 AM    Specimen: Blood   Result Value Ref Range    Lactate 0.8 0.5 - 2.0 mmol/L   Procalcitonin    Collection Time: 08/09/22  4:22 AM    Specimen: Blood   Result Value Ref Range    Procalcitonin 0.28 (H) 0.00 - 0.25 ng/mL   C-reactive Protein    Collection Time: 08/09/22  4:22 AM    Specimen: Blood   Result Value Ref Range    C-Reactive Protein 2.67 (H) 0.00 - 0.50 mg/dL   Phosphorus    Collection Time: 08/09/22  4:22 AM    Specimen: Blood   Result Value Ref Range    Phosphorus 5.0 (H) 2.5 - 4.5 mg/dL   Protime-INR    Collection Time: 08/09/22  4:22 AM    Specimen: Blood   Result Value Ref Range    Protime 12.3 (H) 9.6 - 11.7 Seconds    INR 1.21 (H) 0.93 - 1.10   Comprehensive Metabolic Panel    Collection Time: 08/09/22  4:22 AM    Specimen: Blood   Result Value Ref Range    Glucose 316 (H) 65 - 99 mg/dL    BUN 95 (H) 8 - 23 mg/dL    Creatinine 4.44 (H) 0.76 - 1.27 mg/dL    Sodium 139 136 - 145 mmol/L    Potassium 5.0 3.5 - 5.2 mmol/L    Chloride 107 98 - 107 mmol/L    CO2 16.0 (L) 22.0 - 29.0 mmol/L    Calcium 8.4 (L) 8.6 - 10.5 mg/dL    Total Protein 6.4 6.0 - 8.5 g/dL    Albumin 3.70 3.50 - 5.20 g/dL    ALT (SGPT) 36 1 - 41 U/L    AST (SGOT) 44 (H) 1 - 40 U/L    Alkaline Phosphatase 80 39 - 117 U/L    Total Bilirubin 0.3 0.0 - 1.2 mg/dL     Globulin 2.7 gm/dL    A/G Ratio 1.4 g/dL    BUN/Creatinine Ratio 21.4 7.0 - 25.0    Anion Gap 16.0 (H) 5.0 - 15.0 mmol/L    eGFR 12.3 (L) >60.0 mL/min/1.73   Calcium, Ionized    Collection Time: 08/09/22  4:22 AM    Specimen: Blood   Result Value Ref Range    Ionized Calcium 1.21 1.20 - 1.30 mmol/L   Magnesium    Collection Time: 08/09/22  4:22 AM    Specimen: Blood   Result Value Ref Range    Magnesium 1.8 1.6 - 2.4 mg/dL   aPTT    Collection Time: 08/09/22  4:22 AM    Specimen: Blood   Result Value Ref Range    PTT 32.9 (H) 24.0 - 31.0 seconds   Troponin    Collection Time: 08/09/22  4:22 AM    Specimen: Blood   Result Value Ref Range    Troponin T 0.066 (C) 0.000 - 0.030 ng/mL   CK    Collection Time: 08/09/22  4:22 AM    Specimen: Blood   Result Value Ref Range    Creatine Kinase 168 20 - 200 U/L   CBC Auto Differential    Collection Time: 08/09/22  4:22 AM    Specimen: Blood   Result Value Ref Range    WBC 5.50 3.40 - 10.80 10*3/mm3    RBC 3.86 (L) 4.14 - 5.80 10*6/mm3    Hemoglobin 11.7 (L) 13.0 - 17.7 g/dL    Hematocrit 35.3 (L) 37.5 - 51.0 %    MCV 91.4 79.0 - 97.0 fL    MCH 30.3 26.6 - 33.0 pg    MCHC 33.1 31.5 - 35.7 g/dL    RDW 14.7 12.3 - 15.4 %    RDW-SD 47.7 37.0 - 54.0 fl    MPV 9.6 6.0 - 12.0 fL    Platelets 196 140 - 450 10*3/mm3    Neutrophil % 84.6 (H) 42.7 - 76.0 %    Lymphocyte % 11.4 (L) 19.6 - 45.3 %    Monocyte % 3.7 (L) 5.0 - 12.0 %    Eosinophil % 0.0 (L) 0.3 - 6.2 %    Basophil % 0.3 0.0 - 1.5 %    Neutrophils, Absolute 4.60 1.70 - 7.00 10*3/mm3    Lymphocytes, Absolute 0.60 (L) 0.70 - 3.10 10*3/mm3    Monocytes, Absolute 0.20 0.10 - 0.90 10*3/mm3    Eosinophils, Absolute 0.00 0.00 - 0.40 10*3/mm3    Basophils, Absolute 0.00 0.00 - 0.20 10*3/mm3    nRBC 0.1 0.0 - 0.2 /100 WBC       Current Facility-Administered Medications:   •  acetaminophen (TYLENOL) tablet 650 mg, 650 mg, Oral, Q4H PRN **OR** acetaminophen (TYLENOL) suppository 650 mg, 650 mg, Rectal, Q4H PRN, Josef Chong, APRN  •   aluminum-magnesium hydroxide-simethicone (MAALOX MAX) 400-400-40 MG/5ML suspension 15 mL, 15 mL, Oral, Q6H PRN, Josef Chong APRN  •  dextrose (D50W) (25 g/50 mL) IV injection 25 g, 25 g, Intravenous, Q15 Min PRN, Josef Chong, APRN  •  dextrose (GLUTOSE) oral gel 15 g, 15 g, Oral, Q15 Min PRN, Josef Chong, APRN  •  famotidine (PEPCID) injection 20 mg, 20 mg, Intravenous, Daily, Josef Chong APRN  •  glucagon (human recombinant) (GLUCAGEN DIAGNOSTIC) 1 mg in sterile water (preservative free) 1 mL injection, 1 mg, Intramuscular, Q15 Min PRN, Josef Chong, JORDYN  •  insulin lispro (ADMELOG) injection 0-7 Units, 0-7 Units, Subcutaneous, Q6H, Josef Chong, APRN  •  nitroglycerin (NITROSTAT) SL tablet 0.4 mg, 0.4 mg, Sublingual, Q5 Min PRN, Josef Chong, APRN  •  ondansetron (ZOFRAN) tablet 4 mg, 4 mg, Oral, Q6H PRN **OR** ondansetron (ZOFRAN) injection 4 mg, 4 mg, Intravenous, Q6H PRN, Josef Chong, APRN  •  sodium bicarbonate 8.4 % 150 mEq in dextrose (D5W) 5 % 1,000 mL infusion (greater than 75 mEq), 150 mEq, Intravenous, Continuous, Josef Chong, APRN  •  sodium chloride 0.9 % flush 10 mL, 10 mL, Intravenous, Q12H, Josef Chong, APRN  •  sodium chloride 0.9 % flush 10 mL, 10 mL, Intravenous, PRN, Josef Chong, APRN    Assessment:  Acute kidney injury  Metabolic acidosis  Hyperkalemia  COVID-19 pneumonia  Elevated troponin  Hyperglycemia  Hard of hearing  Recommendations:  IV fluids with bicarb drip  Correct acidosis  Check lactic acid  Avoid nephrotoxic medication  Creatinine is trending down  No need for dialysis at this time        Mike Moraes MD  8/9/2022  05:26 EDT

## 2022-08-09 NOTE — H&P
Patient Care Team:  Ron Hensley MD as PCP - General (Family Medicine)    Chief complaint COVID-19        Assessment & Plan     Hypoxic respiratory insufficiency currently on 4 L  COVID-19  Acute kidney injury metabolic acidosis  Elevated troponin rule out acute coronary syndrome        Plan  Decadron 6 mg p.o. daily  Bicarb drip    Nephrology following  Hold lisinopril  Bronchodilators  Oxygen titration  DVT prophylaxis  GI prophylaxis  Glycemic control            COVID-19 LAB PANEL     COVID-19 test result  COVID19   Date Value Ref Range Status   08/09/2022 Detected (C) Not Detected - Ref. Range Final       COVID-19 Prognostic lab results  WBC with differential  Results from last 7 days   Lab Units 08/09/22  0422   WBC 10*3/mm3 5.50   PLATELETS 10*3/mm3 196   NEUTROPHIL % % 84.6*   LYMPHOCYTE % % 11.4*   NEUTROS ABS 10*3/mm3 4.60   LYMPHS ABS 10*3/mm3 0.60*     Inflammatory markers  Results from last 7 days   Lab Units 08/09/22  1004 08/09/22  0422   CRP mg/dL  --  2.67*   CK TOTAL U/L  --  168   PROCALCITONIN ng/mL  --  0.28*   ALK PHOS U/L  --  80   AST (SGOT) U/L  --  44*   ALT (SGPT) U/L  --  36   TROPONIN T ng/mL 0.059* 0.066*       Poor COVID-19 outcomes associated with:  Neutrophil:Lymphocyte ratio >3.5  Thrombocytopenia, lymphopenia  LFT's >5x upper limit of normal  LDH >400  History      87-year-old white male patient who initially presented to our urgent care because of not feeling good and having abdominal discomfort where he was sent to the emergency room of [facility] where he was found to have COVID and acute kidney injury with a creatinine 5.4 potassium 5.1 with metabolic acidosis CO2 14 was stat flighted to our facility        JOSEMANUEL (acute kidney injury)    Lab test positive for detection of COVID-19 virus    Influenza due to influenza virus, type B    Diarrhea of presumed infectious origin    Hematochezia    Metabolic acidosis    Acute renal failure (ARF) (HCC)    CAD  (coronary artery disease)    History of MI (myocardial infarction)    [place] (hard of hearing)      Past Medical History:   Diagnosis Date   • CAD (coronary artery disease)    • COPD (chronic obstructive pulmonary disease) (HCC)    • Diabetes mellitus (HCC)    • History of MI (myocardial infarction)    • [place] (hard of hearing)    • Hypertension        Past Surgical History:   Procedure Laterality Date   • BACK SURGERY         History reviewed. No pertinent family history.    Social History     Socioeconomic History   • Marital status: Unknown   Tobacco Use   • Smoking status: Former Smoker   • Smokeless tobacco: Never Used   Vaping Use   • Vaping Use: Never used   Substance and Sexual Activity   • Alcohol use: Not Currently   • Drug use: Never   • Sexual activity: Defer       Review of Systems  Review of Systems   HENT: Positive for hearing loss.    Respiratory: Positive for cough and shortness of breath.         Vital Signs  Temp:  [96.7 °F (35.9 °C)-98.6 °F (37 °C)] 96.7 °F (35.9 °C)  Heart Rate:  [57-93] 57  Resp:  [18] 18  BP: ()/(55-76) 94/55    Physical Exam:  Physical Exam  Cardiovascular:      Heart sounds: Murmur heard.   Pulmonary:      Breath sounds: Rhonchi present.           Radiology  Imaging Results (Last 24 Hours)     Procedure Component Value Units Date/Time    XR Chest 1 View [[id number]] Collected: 08/09/22 0748     Updated: 08/09/22 0750    Narrative:      DATE OF EXAM:  8/9/2022 5:20 AM     PROCEDURE:  XR CHEST 1 VW-     INDICATIONS:  SHORTNESS OF BREATH     COMPARISON:  No Comparisons Available     TECHNIQUE:   Single radiographic AP view of the chest was obtained.     FINDINGS:  Heart size borderline. Pulmonary vasculature appears within normal  limits. Strandy airspace disease in the right lung base. Left lung  grossly clear        Impression:      Right basilar atelectasis     Electronically Signed By-Tony De La Torre On:8/9/2022 7:48 AM  This report was finalized on [id number] by   Tony De La Torre, .          Labs:  Results from last 7 days   Lab Units 08/09/22  0422   WBC 10*3/mm3 5.50   HEMOGLOBIN g/dL 11.7*   HEMATOCRIT % 35.3*   PLATELETS 10*3/mm3 196     Results from last 7 days   Lab Units 08/09/22  1004 08/09/22  0422   SODIUM mmol/L 142 139   POTASSIUM mmol/L 4.6 5.0   CHLORIDE mmol/L 111* 107   CO2 mmol/L 19.0* 16.0*   BUN mg/dL 95* 95*   CREATININE mg/dL 4.08* 4.44*   CALCIUM mg/dL 8.6 8.4*   BILIRUBIN mg/dL  --  0.3   ALK PHOS U/L  --  80   ALT (SGPT) U/L  --  36   AST (SGOT) U/L  --  44*   GLUCOSE mg/dL 265* 316*         Results from last 7 days   Lab Units 08/09/22  0422   ALBUMIN g/dL 3.70     Results from last 7 days   Lab Units 08/09/22  1004 08/09/22  0422   CK TOTAL U/L  --  168   TROPONIN T ng/mL 0.059* 0.066*         Results from last 7 days   Lab Units 08/09/22  0422   MAGNESIUM mg/dL 1.8     Results from last 7 days   Lab Units 08/09/22  0422   INR  1.21*   APTT seconds 32.9*               Meds:   SCHEDULE  famotidine, 20 mg, Intravenous, Daily  insulin lispro, 0-9 Units, Subcutaneous, TID AC  sodium chloride, 10 mL, Intravenous, Q12H      Infusions  custom IV infusion builder, , Last Rate: 150 mL/hr at 08/09/22 0948      PRNs  •  acetaminophen **OR** acetaminophen  •  aluminum-magnesium hydroxide-simethicone  •  dextrose  •  dextrose  •  glucagon (human recombinant)  •  nitroglycerin  •  ondansetron **OR** ondansetron  •  sodium chloride      I discussed the patients findings and my recommendations with patient and primary care team.     Tucker Gonzalez MD  08/09/22  10:58 EDT    Time: Critical care 70 min

## 2022-08-09 NOTE — CONSULTS
"    [facility] Medicine Services - Consult Note    Patient Name: Shalom Alexis  : 1935  MRN: [id number]  Primary Care Physician:  Ron Hensley MD  Referring Physician: No Known Provider  Date of admission: 2022    Consults    Subjective      Reason for Consult/ Chief Complaint: Abdominal pain, diarrhea, feeling bad    History of Present Illness: Shalom Alexis is a 87 y.o. male who tested positive for COVID-19 and flu B at [facility] and [facility].  He presented with complaints of abdominal pain, diarrhea, decreased oral intake, and \"feeling bad all over.\"  Apparently the diarrhea has been going on for over 2 weeks.  He was found to have acute renal failure with BUN of 101 and creatinine 5.3 potassium 5.1.  He was also reportedly hypoxic requiring 2 L O2.  CT abdomen/pelvis showed no acute process seen in the liver, spleen, pancreas, adrenal glands, and bladder.  Mild chronic generalized right and left renal cortical volume loss.  Cholelithiasis with no features of cholecystitis.  No abdominal aneurysm.  Slight prominence to the size of the prostate gland.  No acute process seen in the bladder.  Multilevel degenerative disc disease.  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  No free fluid or free air.  No pericardial hemorrhage or retroperitoneal adenopathy.  No features of acute appendicitis.  CT chest showed enlarged heart size, no thoracic aortic aneurysm, small cystic nodule in the left thyroid lobe.  Mild osteoarthritis at the glenohumeral joints.  Small amount enlarged mediastinal nodes.  Small bands of scar versus atelectasis in the lung bases.  Right lower lobe calcified granuloma in the right lung base.  Degenerative disc disease throughout the thoracic spine and upper lumbar spine.  According to records the patient had a pH of 7.18.  He was given 1.5 L IV fluids, sodium bicarb, IV Zosyn.  He was transferred to [facility] " "ICU for further treatment of acute renal failure, diarrhea, hypotension, and hypoxia.    Date     8/9/2022: Labs improving with BUN 95, creatinine 4.08. Nephrology managing Bicarb drip. Troponin elevated 0.066 trending down to 0.059. Pt stated he is feeling \"better\" and denied any specific complaints. Very hard of hearing but alert and oriented x3. Hospitalist group consulted for medical management       Review of Systems   HENT: Negative.    Eyes: Negative.    Cardiovascular: Negative.    Respiratory: Negative.    Endocrine: Negative.    Hematologic/Lymphatic: Negative.    Skin: Negative.    Musculoskeletal: Negative.    Gastrointestinal: Negative.    Genitourinary: Negative.    Neurological: Positive for weakness.   Psychiatric/Behavioral: Negative.    Allergic/Immunologic: Negative.    All other systems reviewed and are negative.      Personal History     Past Medical History:   Diagnosis Date   • CAD (coronary artery disease)    • COPD (chronic obstructive pulmonary disease) (HCC)    • Diabetes mellitus (HCC)    • History of MI (myocardial infarction)    • [place] (hard of hearing)    • Hypertension        Past Surgical History:   Procedure Laterality Date   • BACK SURGERY         Family History: unknown by patient    Social History:  reports that he has quit smoking. He has never used smokeless tobacco. He reports previous alcohol use. He reports that he does not use drugs.    Home Medications:   HYDROcodone-acetaminophen, SITagliptin, fenofibrate, furosemide, glimepiride, lisinopril, omeprazole, pregabalin, sertraline, and traZODone    Allergies:  No Known Allergies      Objective      Vitals:  Temp:  [96.4 °F (35.8 °C)-98.6 °F (37 °C)] 96.4 °F (35.8 °C)  Heart Rate:  [57-93] 58  Resp:  [18] 18  BP: ()/(55-79) 122/69  Flow (L/min):  [1-5] 1    Physical Exam  Vitals and nursing note reviewed.   HENT:      Head: Normocephalic and atraumatic.   Eyes:      Extraocular Movements: Extraocular movements intact.     "  Pupils: Pupils are equal, round, and reactive to light.   Cardiovascular:      Rate and Rhythm: Normal rate and regular rhythm.      Pulses: Normal pulses.      Heart sounds: Normal heart sounds.   Pulmonary:      Effort: Pulmonary effort is normal.      Breath sounds: Normal breath sounds.   Abdominal:      General: Bowel sounds are normal.      Palpations: Abdomen is soft.      Tenderness: There is no abdominal tenderness.   Musculoskeletal:         General: Normal range of motion.   Skin:     General: Skin is warm and dry.   Neurological:      Mental Status: He is alert and oriented to person, place, and time.   Psychiatric:         Mood and Affect: Mood normal.         Behavior: Behavior normal.        Result Review    Result Review:  I have personally reviewed the results from the time of this admission to 8/9/2022 14:25 EDT and agree with these findings:  [x]  Laboratory  []  Microbiology  [x]  Radiology  []  EKG/Telemetry   []  Cardiology/Vascular   []  Pathology  [x]  Old records      Assessment & Plan        Active Hospital Problems:  Active Hospital Problems    Diagnosis    • **Acute renal failure (ARF) (HCC)    • Acute hypoxemic respiratory failure due to COVID-19 (HCC)    • Elevated troponin    • Metabolic acidosis    • Diarrhea of presumed infectious origin    • CAD (coronary artery disease)    • History of MI (myocardial infarction)    • [place] (hard of hearing)    • Hematochezia      Plan:     Acute hypoxemic respiratory failure due to Covid-19  -RVP detected Covid-19, flu B not detected  -CXR: Right basilar atelectasis  -requiring 3L O2 via nasal cannula, wean as tolerated  -decadron 6mg daily  -pulmonary managing     Acute renal failure  Metabolic acidosis  -ACE-I, lasix, diabetic medications all on hold  -bicarb drip managed by nephrology     Diarrhea  -gastrointestinal panel negative  -Cdiff negative     Elevated troponin  -? From demand ischemia versus kidney disease  -troponin trending down from  0.066 to 0.059, no c/o chest pain     DM Type II with hyperglycemia  -hold oral meds while inpatient  -SSI AC/HS    GERD  -cont ppi    Depression  -cont home zoloft     Hard of hearing       This patient has been examined wearing appropriate Personal Protective Equipment 08/09/22      Signature: Electronically signed by JORDYN Farmer, 08/09/22, 2:26 PM EDT.

## 2022-08-10 LAB
ALBUMIN SERPL-MCNC: 3.5 G/DL (ref 3.5–5.2)
ALBUMIN/GLOB SERPL: 1.3 G/DL
ALP SERPL-CCNC: 68 U/L (ref 39–117)
ALT SERPL W P-5'-P-CCNC: 30 U/L (ref 1–41)
ANION GAP SERPL CALCULATED.3IONS-SCNC: 13 MMOL/L (ref 5–15)
AST SERPL-CCNC: 35 U/L (ref 1–40)
BASOPHILS # BLD AUTO: 0 10*3/MM3 (ref 0–0.2)
BASOPHILS NFR BLD AUTO: 0.1 % (ref 0–1.5)
BILIRUB SERPL-MCNC: 0.4 MG/DL (ref 0–1.2)
BUN SERPL-MCNC: 77 MG/DL (ref 8–23)
BUN/CREAT SERPL: 23.5 (ref 7–25)
CALCIUM SPEC-SCNC: 8.9 MG/DL (ref 8.6–10.5)
CHLORIDE SERPL-SCNC: 105 MMOL/L (ref 98–107)
CO2 SERPL-SCNC: 26 MMOL/L (ref 22–29)
CREAT SERPL-MCNC: 3.28 MG/DL (ref 0.76–1.27)
CRP SERPL-MCNC: 2.21 MG/DL (ref 0–0.5)
D DIMER PPP FEU-MCNC: 1.64 MG/L (FEU) (ref 0–0.59)
DEPRECATED RDW RBC AUTO: 46.4 FL (ref 37–54)
EGFRCR SERPLBLD CKD-EPI 2021: 17.7 ML/MIN/1.73
EOSINOPHIL # BLD AUTO: 0 10*3/MM3 (ref 0–0.4)
EOSINOPHIL NFR BLD AUTO: 0 % (ref 0.3–6.2)
ERYTHROCYTE [DISTWIDTH] IN BLOOD BY AUTOMATED COUNT: 14.6 % (ref 12.3–15.4)
FERRITIN SERPL-MCNC: 882.8 NG/ML (ref 30–400)
GLOBULIN UR ELPH-MCNC: 2.8 GM/DL
GLUCOSE BLDC GLUCOMTR-MCNC: 153 MG/DL (ref 70–105)
GLUCOSE BLDC GLUCOMTR-MCNC: 155 MG/DL (ref 70–105)
GLUCOSE BLDC GLUCOMTR-MCNC: 226 MG/DL (ref 70–105)
GLUCOSE BLDC GLUCOMTR-MCNC: 258 MG/DL (ref 70–105)
GLUCOSE SERPL-MCNC: 163 MG/DL (ref 65–99)
HCT VFR BLD AUTO: 34.6 % (ref 37.5–51)
HGB BLD-MCNC: 11.8 G/DL (ref 13–17.7)
LDH SERPL-CCNC: 195 U/L (ref 135–225)
LYMPHOCYTES # BLD AUTO: 1.3 10*3/MM3 (ref 0.7–3.1)
LYMPHOCYTES NFR BLD AUTO: 26 % (ref 19.6–45.3)
MAGNESIUM SERPL-MCNC: 2 MG/DL (ref 1.6–2.4)
MCH RBC QN AUTO: 30.5 PG (ref 26.6–33)
MCHC RBC AUTO-ENTMCNC: 33.9 G/DL (ref 31.5–35.7)
MCV RBC AUTO: 89.9 FL (ref 79–97)
MONOCYTES # BLD AUTO: 0.7 10*3/MM3 (ref 0.1–0.9)
MONOCYTES NFR BLD AUTO: 14.5 % (ref 5–12)
NEUTROPHILS NFR BLD AUTO: 3 10*3/MM3 (ref 1.7–7)
NEUTROPHILS NFR BLD AUTO: 59.4 % (ref 42.7–76)
NRBC BLD AUTO-RTO: 0.1 /100 WBC (ref 0–0.2)
PHOSPHATE SERPL-MCNC: 4.5 MG/DL (ref 2.5–4.5)
PLATELET # BLD AUTO: 189 10*3/MM3 (ref 140–450)
PMV BLD AUTO: 10 FL (ref 6–12)
POTASSIUM SERPL-SCNC: 4.4 MMOL/L (ref 3.5–5.2)
PROCALCITONIN SERPL-MCNC: 0.16 NG/ML (ref 0–0.25)
PROT SERPL-MCNC: 6.3 G/DL (ref 6–8.5)
RBC # BLD AUTO: 3.85 10*6/MM3 (ref 4.14–5.8)
SODIUM SERPL-SCNC: 144 MMOL/L (ref 136–145)
TROPONIN T SERPL-MCNC: 0.06 NG/ML (ref 0–0.03)
TROPONIN T SERPL-MCNC: 0.07 NG/ML (ref 0–0.03)
WBC NRBC COR # BLD: 5.1 10*3/MM3 (ref 3.4–10.8)

## 2022-08-10 PROCEDURE — 82728 ASSAY OF FERRITIN: CPT | Performed by: INTERNAL MEDICINE

## 2022-08-10 PROCEDURE — 93010 ELECTROCARDIOGRAM REPORT: CPT | Performed by: INTERNAL MEDICINE

## 2022-08-10 PROCEDURE — 83615 LACTATE (LD) (LDH) ENZYME: CPT | Performed by: INTERNAL MEDICINE

## 2022-08-10 PROCEDURE — 63710000001 DEXAMETHASONE PER 0.25 MG: Performed by: INTERNAL MEDICINE

## 2022-08-10 PROCEDURE — 84484 ASSAY OF TROPONIN QUANT: CPT | Performed by: NURSE PRACTITIONER

## 2022-08-10 PROCEDURE — 85025 COMPLETE CBC W/AUTO DIFF WBC: CPT | Performed by: NURSE PRACTITIONER

## 2022-08-10 PROCEDURE — 82962 GLUCOSE BLOOD TEST: CPT

## 2022-08-10 PROCEDURE — 99232 SBSQ HOSP IP/OBS MODERATE 35: CPT | Performed by: INTERNAL MEDICINE

## 2022-08-10 PROCEDURE — 86140 C-REACTIVE PROTEIN: CPT | Performed by: INTERNAL MEDICINE

## 2022-08-10 PROCEDURE — 80053 COMPREHEN METABOLIC PANEL: CPT | Performed by: NURSE PRACTITIONER

## 2022-08-10 PROCEDURE — 99222 1ST HOSP IP/OBS MODERATE 55: CPT | Performed by: INTERNAL MEDICINE

## 2022-08-10 PROCEDURE — 85379 FIBRIN DEGRADATION QUANT: CPT | Performed by: INTERNAL MEDICINE

## 2022-08-10 PROCEDURE — 25010000002 ENOXAPARIN PER 10 MG: Performed by: NURSE PRACTITIONER

## 2022-08-10 PROCEDURE — 83735 ASSAY OF MAGNESIUM: CPT | Performed by: NURSE PRACTITIONER

## 2022-08-10 PROCEDURE — 25010000002 HYDRALAZINE PER 20 MG: Performed by: NURSE PRACTITIONER

## 2022-08-10 PROCEDURE — 84100 ASSAY OF PHOSPHORUS: CPT | Performed by: NURSE PRACTITIONER

## 2022-08-10 PROCEDURE — 63710000001 INSULIN LISPRO (HUMAN) PER 5 UNITS: Performed by: INTERNAL MEDICINE

## 2022-08-10 PROCEDURE — 93005 ELECTROCARDIOGRAM TRACING: CPT | Performed by: INTERNAL MEDICINE

## 2022-08-10 PROCEDURE — 84145 PROCALCITONIN (PCT): CPT | Performed by: INTERNAL MEDICINE

## 2022-08-10 RX ORDER — SODIUM CHLORIDE 9 MG/ML
50 INJECTION, SOLUTION INTRAVENOUS CONTINUOUS
Status: DISCONTINUED | OUTPATIENT
Start: 2022-08-10 | End: 2022-08-20 | Stop reason: HOSPADM

## 2022-08-10 RX ORDER — HYDRALAZINE HYDROCHLORIDE 20 MG/ML
10 INJECTION INTRAMUSCULAR; INTRAVENOUS EVERY 4 HOURS PRN
Status: DISCONTINUED | OUTPATIENT
Start: 2022-08-10 | End: 2022-08-16

## 2022-08-10 RX ORDER — ZINC SULFATE 50(220)MG
220 CAPSULE ORAL DAILY
Status: DISCONTINUED | OUTPATIENT
Start: 2022-08-10 | End: 2022-08-20 | Stop reason: HOSPADM

## 2022-08-10 RX ORDER — ASCORBIC ACID 500 MG
1000 TABLET ORAL DAILY
Status: DISCONTINUED | OUTPATIENT
Start: 2022-08-10 | End: 2022-08-20 | Stop reason: HOSPADM

## 2022-08-10 RX ADMIN — FENOFIBRATE 48 MG: 48 TABLET ORAL at 08:05

## 2022-08-10 RX ADMIN — PANTOPRAZOLE SODIUM 40 MG: 40 TABLET, DELAYED RELEASE ORAL at 05:37

## 2022-08-10 RX ADMIN — OXYCODONE HYDROCHLORIDE AND ACETAMINOPHEN 1000 MG: 500 TABLET ORAL at 19:52

## 2022-08-10 RX ADMIN — SODIUM CHLORIDE 50 ML/HR: 9 INJECTION, SOLUTION INTRAVENOUS at 11:37

## 2022-08-10 RX ADMIN — INSULIN LISPRO 5 UNITS: 100 INJECTION, SOLUTION INTRAVENOUS; SUBCUTANEOUS at 20:07

## 2022-08-10 RX ADMIN — ENOXAPARIN SODIUM 30 MG: 100 INJECTION SUBCUTANEOUS at 16:59

## 2022-08-10 RX ADMIN — Medication 10 ML: at 08:06

## 2022-08-10 RX ADMIN — SODIUM BICARBONATE: 84 INJECTION, SOLUTION INTRAVENOUS at 05:37

## 2022-08-10 RX ADMIN — ZINC SULFATE 220 MG (50 MG) CAPSULE 220 MG: CAPSULE at 19:52

## 2022-08-10 RX ADMIN — Medication 100 MG: at 19:52

## 2022-08-10 RX ADMIN — PREGABALIN 150 MG: 75 CAPSULE ORAL at 08:05

## 2022-08-10 RX ADMIN — INSULIN LISPRO 8 UNITS: 100 INJECTION, SOLUTION INTRAVENOUS; SUBCUTANEOUS at 17:21

## 2022-08-10 RX ADMIN — HYDRALAZINE HYDROCHLORIDE 10 MG: 20 INJECTION INTRAMUSCULAR; INTRAVENOUS at 17:00

## 2022-08-10 RX ADMIN — Medication 10 ML: at 20:01

## 2022-08-10 RX ADMIN — SERTRALINE 100 MG: 100 TABLET, FILM COATED ORAL at 08:05

## 2022-08-10 RX ADMIN — PREGABALIN 150 MG: 75 CAPSULE ORAL at 20:00

## 2022-08-10 RX ADMIN — INSULIN LISPRO 3 UNITS: 100 INJECTION, SOLUTION INTRAVENOUS; SUBCUTANEOUS at 08:05

## 2022-08-10 RX ADMIN — INSULIN LISPRO 3 UNITS: 100 INJECTION, SOLUTION INTRAVENOUS; SUBCUTANEOUS at 11:28

## 2022-08-10 RX ADMIN — DEXAMETHASONE 6 MG: 4 TABLET ORAL at 08:05

## 2022-08-10 RX ADMIN — Medication 5000 UNITS: at 19:52

## 2022-08-10 RX ADMIN — TRAZODONE HYDROCHLORIDE 50 MG: 50 TABLET ORAL at 20:00

## 2022-08-10 RX ADMIN — PREGABALIN 150 MG: 75 CAPSULE ORAL at 16:59

## 2022-08-10 NOTE — CONSULTS
Cardiology Consult Note    Patient Identification:  Name: Shalom Alexis  Age: 87 y.o.  Sex: male  :  1935  MRN: [id number]             Requesting Physician : Dr. Feliz    Reason for Consultation / Chief Complaint : Elevated troponin    History of Present Illness:        Mr. Shalom Alexis has PMH of    CAD details of which are not available  History of MI  Diabetes  Hypertension  COPD  Hard of hearing  Back surgery  Non-smoker  NKDA  Family history unknown to the patient    Presented 2022 with complaint of abdominal pain and diarrhea decreased p.o. intake feeling bad all over to [facility] was positive for COVID and flu B.  Was found to have acute renal failure with BUN/creatinine of 101/5.3 and hypoxemia requiring oxygen.  Work-up revealed CT of the chest revealing thoracic aortic aneurysm.  Cardiology was consulted because of elevated troponin of 0.059.  Patient is a poor historian due to being hard of hearing.    Data 8/10/2022: Hemoglobin 11.8, CRP 2.2, troponin 0.059-0.061  Chest x-ray right-sided atelectasis  EKG done today reviewed/interpreted by me reveals sinus rhythm with rate of 60 bpm with IVCD      Assessment:  :    Elevated troponin at 0.055-stress test 0.067  Acute hypoxemic respiratory failure due to COVID-19 infection/acute kidney injury/diarrhea  Thoracic aortic aneurysm  History of CAD, hypertension, diabetes      Recommendations / Plan:        Telemetry to monitor rhythm  EKG is revealing no acute ischemic changes  Patient troponin elevation is probably due to kidney injury versus demand ischemia  We will monitor serial troponins for trend  Check echocardiogram  Will follow along with you and consider further evaluation treatment  Discussed with RN taking care of patient to coordinate care          No diagnosis found.           Past Medical History:  Past Medical History:   Diagnosis Date   • CAD (coronary artery disease)    • COPD  (chronic obstructive pulmonary disease) (HCC)    • Diabetes mellitus (HCC)    • History of MI (myocardial infarction)    • [place] (hard of hearing)    • Hypertension      Past Surgical History:  Past Surgical History:   Procedure Laterality Date   • BACK SURGERY        Allergies:  No Known Allergies  Home Meds:  Medications Prior to Admission   Medication Sig Dispense Refill Last Dose   • fenofibrate (TRICOR) 145 MG tablet Take 145 mg by mouth Daily.      • furosemide (LASIX) 40 MG tablet Take 40 mg by mouth Daily.      • glimepiride (AMARYL) 1 MG tablet Take 1 mg by mouth Every Morning Before Breakfast.      • HYDROcodone-acetaminophen (NORCO) 5-325 MG per tablet Take 1 tablet by mouth 3 (Three) Times a Day As Needed.      • lisinopril (PRINIVIL,ZESTRIL) 5 MG tablet Take 5 mg by mouth Daily.      • omeprazole (priLOSEC) 20 MG capsule Take 20 mg by mouth Daily.      • pregabalin (LYRICA) 150 MG capsule Take 150 mg by mouth 3 (Three) Times a Day.      • sertraline (ZOLOFT) 100 MG tablet Take 100 mg by mouth Daily.      • SITagliptin (JANUVIA) 25 MG tablet Take 25 mg by mouth Daily.      • traZODone (DESYREL) 50 MG tablet Take 50 mg by mouth Every Night.        Current Meds:     Current Facility-Administered Medications:   •  acetaminophen (TYLENOL) tablet 650 mg, 650 mg, Oral, Q4H PRN **OR** acetaminophen (TYLENOL) suppository 650 mg, 650 mg, Rectal, Q4H PRN, Josef Chong, APRN  •  aluminum-magnesium hydroxide-simethicone (MAALOX MAX) 400-400-40 MG/5ML suspension 15 mL, 15 mL, Oral, Q6H PRN, Josef Chong, APRN  •  dexamethasone (DECADRON) tablet 6 mg, 6 mg, Oral, Daily With Breakfast, Tucker Gonzalez MD, 6 mg at 08/10/22 0805  •  dextrose (D50W) (25 g/50 mL) IV injection 25 g, 25 g, Intravenous, Q15 Min PRN, Josef Chong, APRN  •  dextrose (GLUTOSE) oral gel 15 g, 15 g, Oral, Q15 Min PRN, Josef Chong, APRN  •  Enoxaparin Sodium (LOVENOX) syringe 30 mg, 30 mg, Subcutaneous, Q24H, Day, Isabela, APRN, 30 mg at  08/10/22 1659  •  fenofibrate (TRICOR) tablet 48 mg, 48 mg, Oral, Daily, Day, Isabela, APRN, 48 mg at 08/10/22 0805  •  glucagon (human recombinant) (GLUCAGEN DIAGNOSTIC) 1 mg in sterile water (preservative free) 1 mL injection, 1 mg, Intramuscular, Q15 Min PRN, Josef Chong APRN  •  hydrALAZINE (APRESOLINE) injection 10 mg, 10 mg, Intravenous, Q4H PRN, Josef Chong APRN, 10 mg at 08/10/22 1700  •  HYDROcodone-acetaminophen (NORCO) 5-325 MG per tablet 1 tablet, 1 tablet, Oral, Q8H PRN, Day, Isabela, APRN  •  insulin lispro (ADMELOG) injection 0-14 Units, 0-14 Units, Subcutaneous, 4x Daily With Meals & Nightly, Valerie Feliz MD, 3 Units at 08/10/22 1128  •  nitroglycerin (NITROSTAT) SL tablet 0.4 mg, 0.4 mg, Sublingual, Q5 Min PRN, Josef Chong APRN  •  ondansetron (ZOFRAN) tablet 4 mg, 4 mg, Oral, Q6H PRN **OR** ondansetron (ZOFRAN) injection 4 mg, 4 mg, Intravenous, Q6H PRN, Josef Chong APRN  •  pantoprazole (PROTONIX) EC tablet 40 mg, 40 mg, Oral, Q AM, Valerie Feliz MD, 40 mg at 08/10/22 0537  •  pregabalin (LYRICA) capsule 150 mg, 150 mg, Oral, TID, Day, Isabela, APRN, 150 mg at 08/10/22 1659  •  sertraline (ZOLOFT) tablet 100 mg, 100 mg, Oral, Daily, Day, Isabela, APRN, 100 mg at 08/10/22 0805  •  sodium chloride 0.9 % flush 10 mL, 10 mL, Intravenous, Q12H, Josef Chong APRN, 10 mL at 08/10/22 0806  •  sodium chloride 0.9 % flush 10 mL, 10 mL, Intravenous, PRN, Love, Josef E, APRN  •  sodium chloride 0.9 % infusion, 50 mL/hr, Intravenous, Continuous, Mike Moraes MD, Last Rate: 50 mL/hr at 08/10/22 1137, 50 mL/hr at 08/10/22 1137  •  traZODone (DESYREL) tablet 50 mg, 50 mg, Oral, Nightly, Day, JORDYN Mar, 50 mg at 08/09/22 2037  Social History:   Social History     Tobacco Use   • Smoking status: Former Smoker   • Smokeless tobacco: Never Used   Substance Use Topics   • Alcohol use: Not Currently      Family History:  History reviewed. No pertinent family history.     Review of Systems :  "Review of Systems   Unable to perform ROS: dementia                  Constitutional:  Temp:  [95.9 °F (35.5 °C)-97.2 °F (36.2 °C)] 97.2 °F (36.2 °C)  Heart Rate:  [50-69] 60  Resp:  [15] 15  BP: (124-162)/(69-90) 162/90    Physical Exam   /90   Pulse 60   Temp 97.2 °F (36.2 °C) (Rectal)   Resp 15   Ht 185.4 cm (73\")   Wt 99.9 kg (220 lb 3.8 oz)   SpO2 100%   BMI 29.06 kg/m²   Physical Exam  General:  Appears in no acute distress  Eyes: Sclera is anicteric,  conjunctiva is clear   HEENT: Is hard of hearing.  Respiratory: Respirations regular and unlabored at rest.  Decreased breath sounds at bases with scattered rhonchi  Cardiovascular: S1,S2 Regular rate and rhythm.  2/6 holosystolic murmur  Gastrointestinal: Abdomen soft, flat, non tender. Bowel sounds present.   Musculoskeletal:  No abnormal movements  Extremities: No digital clubbing or cyanosis  Skin: Color pink. Skin warm and dry to touch. No rashes  No xanthoma  Neuro: Alert and awake, no lateralizing deficits appreciated    Cardiographics  ECG: EKG tracing was  personally reviewed/interpreted by me  ECG 12 Lead   Preliminary Result   HEART RATE= 60  bpm   RR Interval= 996  ms   CT Interval= 170  ms   P Horizontal Axis= 15  deg   P Front Axis= 48  deg   QRSD Interval= 123  ms   QT Interval= 463  ms   QRS Axis= -25  deg   T Wave Axis= 21  deg   - ABNORMAL ECG -   Sinus rhythm   Nonspecific intraventricular conduction delay   No previous ECG available for comparison   Electronically Signed By:    Date and Time of Study: 2022-08-10 13:44:33      ECG 12 Lead    (Results Pending)       Telemetry: Sinus rhythm    Echocardiogram:       Imaging  Chest X-ray:   Imaging Results (Last 24 Hours)     ** No results found for the last 24 hours. **          Lab Review: I have reviewed the labs  Results from last 7 days   Lab Units 08/10/22  1104 08/10/22  0511 08/09/22  1532 08/09/22  1004 08/09/22  0422   CK TOTAL U/L  --   --   --   --  168   TROPONIN T ng/mL " 0.067* 0.055* 0.050*   < > 0.066*    < > = values in this interval not displayed.     Results from last 7 days   Lab Units 08/10/22  0511   MAGNESIUM mg/dL 2.0     Results from last 7 days   Lab Units 08/10/22  0511   SODIUM mmol/L 144   POTASSIUM mmol/L 4.4   BUN mg/dL 77*   CREATININE mg/dL 3.28*   CALCIUM mg/dL 8.9             Results from last 7 days   Lab Units 08/10/22  0511 08/09/22  0422   WBC 10*3/mm3 5.10 5.50   HEMOGLOBIN g/dL 11.8* 11.7*   HEMATOCRIT % 34.6* 35.3*   PLATELETS 10*3/mm3 189 196     Results from last 7 days   Lab Units 08/09/22  0422   INR  1.21*   APTT seconds 32.9*             Twin Viera MD  8/10/2022, 17:05 EDT      EMR Dragon/Transcription:   Dictated utilizing Dragon dictation

## 2022-08-10 NOTE — PROGRESS NOTES
"                                                                                                                                      Nephrology  Progress Note                                        [facility]    Patient Identification    Name: Shalom Alexis  Age: 87 y.o.  Sex: male  :  1935  MRN: [id number]      DATE OF SERVICE:  8/10/2022        Subective    Resting     Objective   Scheduled Meds:dexamethasone, 6 mg, Oral, Daily With Breakfast  enoxaparin, 30 mg, Subcutaneous, Q24H  fenofibrate, 48 mg, Oral, Daily  insulin lispro, 0-14 Units, Subcutaneous, 4x Daily With Meals & Nightly  pantoprazole, 40 mg, Oral, Q AM  pregabalin, 150 mg, Oral, TID  sertraline, 100 mg, Oral, Daily  sodium chloride, 10 mL, Intravenous, Q12H  traZODone, 50 mg, Oral, Nightly          Continuous Infusions:custom IV infusion builder, , Last Rate: 150 mL/hr at 08/10/22 0537        PRN Meds:•  acetaminophen **OR** acetaminophen  •  aluminum-magnesium hydroxide-simethicone  •  dextrose  •  dextrose  •  glucagon (human recombinant)  •  HYDROcodone-acetaminophen  •  nitroglycerin  •  ondansetron **OR** ondansetron  •  sodium chloride     Exam:  /90   Pulse 60   Temp 97.2 °F (36.2 °C) (Rectal)   Resp 15   Ht 185.4 cm (73\")   Wt 99.9 kg (220 lb 3.8 oz)   SpO2 100%   BMI 29.06 kg/m²     Intake/Output last 3 shifts:  I/O last 3 completed shifts:  In: 1632 [P.O.:480; I.V.:1152]  Out:  [Urine:212]    Intake/Output this shift:  I/O this shift:  In: -   Out: 250 [Urine:250]    Physical exam:  General Appearance:  no distress   ithout obvious abnormality, atraumatic  Eyes:  PERRL, conjunctiva/corneas clear     Neck:  Supple,  no adenopathy;      Lungs:  Decreased BS occasion ronchi  Heart:  Regular rate and rhythm, S1 and S2 normal  Abdomen:  Soft, non-tender, bowel sounds active   Extremities: trace edema  Pulses: 2+ and symmetric all extremities  Skin:  No rashes or lesions       Data Review:  All labs " (24hrs):   Recent Results (from the past 24 hour(s))   POC Glucose Once    Collection Time: 08/09/22  8:05 AM    Specimen: Blood   Result Value Ref Range    Glucose 285 (H) 70 - 105 mg/dL   Sodium, Urine, Random - Urine, Clean Catch    Collection Time: 08/09/22  9:45 AM    Specimen: Urine, Clean Catch   Result Value Ref Range    Sodium, Urine 71 mmol/L   Urinalysis With Culture If Indicated - Urine, Clean Catch    Collection Time: 08/09/22  9:46 AM    Specimen: Urine, Clean Catch   Result Value Ref Range    Color, UA Yellow Yellow, Straw    Appearance, UA Clear Clear    pH, UA <=5.0 5.0 - 8.0    Specific Gravity, UA 1.013 1.005 - 1.030    Glucose,  mg/dL (2+) (A) Negative    Ketones, UA Negative Negative    Bilirubin, UA Negative Negative    Blood, UA Trace (A) Negative    Protein, UA 30 mg/dL (1+) (A) Negative    Leuk Esterase, UA Trace (A) Negative    Nitrite, UA Negative Negative    Urobilinogen, UA 0.2 E.U./dL 0.2 - 1.0 E.U./dL   Urinalysis, Microscopic Only - Urine, Clean Catch    Collection Time: 08/09/22  9:46 AM    Specimen: Urine, Clean Catch   Result Value Ref Range    RBC, UA 0-2 (A) None Seen /HPF    WBC, UA 0-2 (A) None Seen /HPF    Bacteria, UA None Seen None Seen /HPF    Squamous Epithelial Cells, UA 0-2 None Seen, 0-2 /HPF    Hyaline Casts, UA 0-2 None Seen /LPF    Methodology Automated Microscopy    Troponin    Collection Time: 08/09/22 10:04 AM    Specimen: Blood   Result Value Ref Range    Troponin T 0.059 (C) 0.000 - 0.030 ng/mL   Lactic Acid, Plasma    Collection Time: 08/09/22 10:04 AM    Specimen: Blood   Result Value Ref Range    Lactate 1.3 0.5 - 2.0 mmol/L   Basic Metabolic Panel    Collection Time: 08/09/22 10:04 AM    Specimen: Blood   Result Value Ref Range    Glucose 265 (H) 65 - 99 mg/dL    BUN 95 (H) 8 - 23 mg/dL    Creatinine 4.08 (H) 0.76 - 1.27 mg/dL    Sodium 142 136 - 145 mmol/L    Potassium 4.6 3.5 - 5.2 mmol/L    Chloride 111 (H) 98 - 107 mmol/L    CO2 19.0 (L) 22.0 -  29.0 mmol/L    Calcium 8.6 8.6 - 10.5 mg/dL    BUN/Creatinine Ratio 23.3 7.0 - 25.0    Anion Gap 12.0 5.0 - 15.0 mmol/L    eGFR 13.6 (L) >60.0 mL/min/1.73   Clostridioides difficile EIA - Stool, Per Rectum    Collection Time: 08/09/22 11:20 AM    Specimen: Per Rectum; Stool   Result Value Ref Range    C Diff GDH / Toxin Negative Negative   Gastrointestinal Panel, PCR - Stool, Per Rectum    Collection Time: 08/09/22 11:20 AM    Specimen: Per Rectum; Stool   Result Value Ref Range    Campylobacter Not Detected Not Detected    Plesiomonas shigelloides Not Detected Not Detected    Salmonella Not Detected Not Detected    Vibrio Not Detected Not Detected    Vibrio cholerae Not Detected Not Detected    Yersinia enterocolitica Not Detected Not Detected    Enteroaggregative E. coli (EAEC) Not Detected Not Detected    Enteropathogenic E. coli (EPEC) Not Detected Not Detected    Enterotoxigenic E. coli (ETEC) lt/st Not Detected Not Detected    Shiga-like toxin-producing E. coli (STEC) stx1/stx2 Not Detected Not Detected    Shigella/Enteroinvasive E. coli (EIEC) Not Detected Not Detected    Cryptosporidium Not Detected Not Detected    Cyclospora cayetanensis Not Detected Not Detected    Entamoeba histolytica Not Detected Not Detected    Giardia lamblia Not Detected Not Detected    Adenovirus F40/41 Not Detected Not Detected    Astrovirus Not Detected Not Detected    Norovirus GI/GII Not Detected Not Detected    Rotavirus A Not Detected Not Detected    Sapovirus (I, II, IV or V) Not Detected Not Detected   POC Glucose Once    Collection Time: 08/09/22 11:45 AM    Specimen: Blood   Result Value Ref Range    Glucose 291 (H) 70 - 105 mg/dL   Troponin    Collection Time: 08/09/22  3:32 PM    Specimen: Blood   Result Value Ref Range    Troponin T 0.050 (C) 0.000 - 0.030 ng/mL   POC Glucose Once    Collection Time: 08/09/22  4:33 PM    Specimen: Blood   Result Value Ref Range    Glucose 217 (H) 70 - 105 mg/dL   POC Glucose Once     Collection Time: 08/09/22  8:31 PM    Specimen: Blood   Result Value Ref Range    Glucose 198 (H) 70 - 105 mg/dL   Troponin    Collection Time: 08/10/22  5:11 AM    Specimen: Blood   Result Value Ref Range    Troponin T 0.055 (C) 0.000 - 0.030 ng/mL   Magnesium    Collection Time: 08/10/22  5:11 AM    Specimen: Blood   Result Value Ref Range    Magnesium 2.0 1.6 - 2.4 mg/dL   Phosphorus    Collection Time: 08/10/22  5:11 AM    Specimen: Blood   Result Value Ref Range    Phosphorus 4.5 2.5 - 4.5 mg/dL   Comprehensive Metabolic Panel    Collection Time: 08/10/22  5:11 AM    Specimen: Blood   Result Value Ref Range    Glucose 163 (H) 65 - 99 mg/dL    BUN 77 (H) 8 - 23 mg/dL    Creatinine 3.28 (H) 0.76 - 1.27 mg/dL    Sodium 144 136 - 145 mmol/L    Potassium 4.4 3.5 - 5.2 mmol/L    Chloride 105 98 - 107 mmol/L    CO2 26.0 22.0 - 29.0 mmol/L    Calcium 8.9 8.6 - 10.5 mg/dL    Total Protein 6.3 6.0 - 8.5 g/dL    Albumin 3.50 3.50 - 5.20 g/dL    ALT (SGPT) 30 1 - 41 U/L    AST (SGOT) 35 1 - 40 U/L    Alkaline Phosphatase 68 39 - 117 U/L    Total Bilirubin 0.4 0.0 - 1.2 mg/dL    Globulin 2.8 gm/dL    A/G Ratio 1.3 g/dL    BUN/Creatinine Ratio 23.5 7.0 - 25.0    Anion Gap 13.0 5.0 - 15.0 mmol/L    eGFR 17.7 (L) >60.0 mL/min/1.73   C-reactive Protein    Collection Time: 08/10/22  5:11 AM    Specimen: Blood   Result Value Ref Range    C-Reactive Protein 2.21 (H) 0.00 - 0.50 mg/dL   Procalcitonin    Collection Time: 08/10/22  5:11 AM    Specimen: Blood   Result Value Ref Range    Procalcitonin 0.16 0.00 - 0.25 ng/mL   Ferritin    Collection Time: 08/10/22  5:11 AM    Specimen: Blood   Result Value Ref Range    Ferritin 882.80 (H) 30.00 - 400.00 ng/mL   Lactate Dehydrogenase    Collection Time: 08/10/22  5:11 AM    Specimen: Blood   Result Value Ref Range     135 - 225 U/L   D-dimer, Quantitative    Collection Time: 08/10/22  5:11 AM    Specimen: Blood   Result Value Ref Range    D-Dimer, Quantitative 1.64 (H) 0.00 - 0.59  mg/L (FEU)   CBC Auto Differential    Collection Time: 08/10/22  5:11 AM    Specimen: Blood   Result Value Ref Range    WBC 5.10 3.40 - 10.80 10*3/mm3    RBC 3.85 (L) 4.14 - 5.80 10*6/mm3    Hemoglobin 11.8 (L) 13.0 - 17.7 g/dL    Hematocrit 34.6 (L) 37.5 - 51.0 %    MCV 89.9 79.0 - 97.0 fL    MCH 30.5 26.6 - 33.0 pg    MCHC 33.9 31.5 - 35.7 g/dL    RDW 14.6 12.3 - 15.4 %    RDW-SD 46.4 37.0 - 54.0 fl    MPV 10.0 6.0 - 12.0 fL    Platelets 189 140 - 450 10*3/mm3    Neutrophil % 59.4 42.7 - 76.0 %    Lymphocyte % 26.0 19.6 - 45.3 %    Monocyte % 14.5 (H) 5.0 - 12.0 %    Eosinophil % 0.0 (L) 0.3 - 6.2 %    Basophil % 0.1 0.0 - 1.5 %    Neutrophils, Absolute 3.00 1.70 - 7.00 10*3/mm3    Lymphocytes, Absolute 1.30 0.70 - 3.10 10*3/mm3    Monocytes, Absolute 0.70 0.10 - 0.90 10*3/mm3    Eosinophils, Absolute 0.00 0.00 - 0.40 10*3/mm3    Basophils, Absolute 0.00 0.00 - 0.20 10*3/mm3    nRBC 0.1 0.0 - 0.2 /100 WBC          Imaging:  XR Chest 1 View    Result Date: 8/9/2022  Right basilar atelectasis  Electronically Signed By-Tony De La Torre On:8/9/2022 7:48 AM This report was finalized on [id number] by  Tony De La Torre, .      Assessment/Plan:     Acute renal failure (ARF) (HCC)    Acute hypoxemic respiratory failure due to COVID-19 (HCC)    Diarrhea of presumed infectious origin    Hematochezia    Metabolic acidosis    CAD (coronary artery disease)    History of MI (myocardial infarction)    [place] (hard of hearing)    Elevated troponin     Acute kidney injury  Metabolic acidosis  Hyperkalemia  COVID-19 pneumonia  Elevated troponin  Hyperglycemia  Hard of hearing      Creatinine trending down  Acidosis improving  DC bicarb drip  Changed to half-normal saline at 50 cc an hour

## 2022-08-10 NOTE — PLAN OF CARE
Goal Outcome Evaluation:               Pt fully alert and oriented. Weaned off of 3l nc to room air, sats maintaining well. Bicarb dripped stopped and NS started.Troponin came back elevated, cardiology consulted and ekg obtained. Vss

## 2022-08-10 NOTE — PROGRESS NOTES
[facility] Medicine Services Daily Progress Note    Patient Name: Shalom Alexis  : 1935  MRN: [id number]  Primary Care Physician:  Ron Hensley MD  Date of admission: 2022      Subjective      Chief Complaint: Abdominal pain, diarrhea, feeling bad      Patient Reports  No new comlaints or issuvd    Review of Systems   All other systems reviewed and are negative.      Objective      Vitals:   Temp:  [95.9 °F (35.5 °C)-97.2 °F (36.2 °C)] 97.2 °F (36.2 °C)  Heart Rate:  [50-69] 60  Resp:  [15] 15  BP: (124-162)/(69-90) 162/90  Flow (L/min):  [1-3] 1    Physical Exam   Vital signs reviewed.  Nursing notes reviewed.  General: well-developed and well-nourished, NAD  HEENT: NC/AT, EOMI, PERRLA  Heart: RRR. No murmur   Chest: CTAB, no w/r/r, normal respiratory effort  Abdominal: Soft. NT/ND. Bowel sounds present  Musculoskeletal: Normal ROM.  No edema. No calf tenderness.  Neurological: AAOx3, no focal deficits  Skin: Skin is warm and dry. No rash  Psychiatric: Normal mood and affect.       Result Review    Result Review:  I have personally reviewed the results from the time of this admission to 8/10/2022 17:41 EDT and agree with these findings:  [x]  Laboratory  [x]  Microbiology  [x]  Radiology  [x]  EKG/Telemetry   [x]  Cardiology/Vascular   []  Pathology  []  Old records  []  Other:  Most notable findings include:          COVID-19 LAB PANEL     COVID-19 test result  COVID19   Date Value Ref Range Status   2022 Detected (C) Not Detected - Ref. Range Final       COVID-19 Prognostic lab results  WBC with differential  Results from last 7 days   Lab Units 08/10/22  0511 22  0422   WBC 10*3/mm3 5.10 5.50   PLATELETS 10*3/mm3 189 196   NEUTROPHIL % % 59.4 84.6*   LYMPHOCYTE % % 26.0 11.4*   NEUTROS ABS 10*3/mm3 3.00 4.60   LYMPHS ABS 10*3/mm3 1.30 0.60*     Inflammatory markers  Results from last 7 days   Lab Units 08/10/22  1104 08/10/22  0511 22  1534  "08/09/22  1004 08/09/22  0422   CRP mg/dL  --  2.21*  --   --  2.67*   FERRITIN ng/mL  --  882.80*  --   --   --    CK TOTAL U/L  --   --   --   --  168   D DIMER QUANT mg/L (FEU)  --  1.64*  --   --   --    PROCALCITONIN ng/mL  --  0.16  --   --  0.28*   LDH U/L  --  195  --   --   --    ALK PHOS U/L  --  68  --   --  80   AST (SGOT) U/L  --  35  --   --  44*   ALT (SGPT) U/L  --  30  --   --  36   TROPONIN T ng/mL 0.067* 0.055* 0.050* 0.059* 0.066*       Poor COVID-19 outcomes associated with:  Neutrophil:Lymphocyte ratio >3.5  Thrombocytopenia, lymphopenia  LFT's >5x upper limit of normal  LDH >400      Assessment & Plan      Brief Patient Summary:  Shalom Aelxis is a 87 y.o. male who tested positive for COVID-19 and flu B at [facility].  He presented with complaints of abdominal pain, diarrhea, decreased oral intake, and \"feeling bad all over.\"  Apparently the diarrhea has been going on for over 2 weeks.  He was found to have acute renal failure with BUN of 101 and creatinine 5.3 potassium 5.1.  He was also reportedly hypoxic requiring 2 L O2.      8/9/2022: Labs improving with BUN 95, creatinine 4.08. Nephrology managing Bicarb drip. Troponin elevated 0.066 trending down to 0.059. Pt stated he is feeling \"better\" and denied any specific complaints. Very hard of hearing but alert and oriented x3. Hospitalist group consulted for medical management       ascorbic acid, 1,000 mg, Oral, Daily  dexamethasone, 6 mg, Oral, Daily With Breakfast  enoxaparin, 30 mg, Subcutaneous, Q24H  fenofibrate, 48 mg, Oral, Daily  insulin lispro, 0-14 Units, Subcutaneous, 4x Daily With Meals & Nightly  pantoprazole, 40 mg, Oral, Q AM  pregabalin, 150 mg, Oral, TID  sertraline, 100 mg, Oral, Daily  sodium chloride, 10 mL, Intravenous, Q12H  thiamine, 100 mg, Oral, Daily  traZODone, 50 mg, Oral, Nightly  cholecalciferol, 5,000 Units, Oral, Daily  zinc sulfate, 220 mg, Oral, Daily       sodium " chloride, 50 mL/hr, Last Rate: 50 mL/hr (08/10/22 1137)         Active Hospital Problems:  Active Hospital Problems    Diagnosis    • **Acute renal failure (ARF) (HCC)    • Acute hypoxemic respiratory failure due to COVID-19 (HCC)    • Diarrhea of presumed infectious origin    • Elevated troponin    • CAD (coronary artery disease)    • History of MI (myocardial infarction)    • [place] (hard of hearing)    • Hematochezia    • Metabolic acidosis      Plan:   Acute hypoxemic respiratory failure due to Covid-19  -- Too late for Remdesivir  -- Dexamethasone x10 days  -- Supplemental O2 as needed  -- Monitor lab trends  -- Pulmonology following     Acute renal failure  Metabolic acidosis  --Hold ACE inhibitor, Lasix  -- Nephrology following     Diarrhea - improving  --GI panel negative  --C. difficile negative  --Maintain hydration and electrolyte     Elevated troponin  -- ?  Demand ischemia vs kidney disease     DM Type II with hyperglycemia  -- glimepiride and januvia held on admission  -- [employer] AC/HS  -- SSI coverage as needed  -- check HgbA1c     GERD  --continue pantoprazole     Depression  --continue sertraline      Hard of hearing     DVT prophylaxis:  Medical and mechanical DVT prophylaxis orders are present.    CODE STATUS:    Code Status (Patient has no pulse and is not breathing): CPR (Attempt to Resuscitate)  Medical Interventions (Patient has pulse or is breathing): Full Support      Disposition:  I expect patient to be discharged pending clinical course and consultant recommendations.    This patient has been examined wearing appropriate Personal Protective Equipment. 08/10/22      Electronically signed by Valerie Feliz MD, 08/10/22, 17:41 EDT.  [facility] Hospitalist Team

## 2022-08-10 NOTE — PROGRESS NOTES
ICU Daily Progress Note        Acute renal failure (ARF) (HCC)    Acute hypoxemic respiratory failure due to COVID-19 (HCC)    Diarrhea of presumed infectious origin    Hematochezia    Metabolic acidosis    CAD (coronary artery disease)    History of MI (myocardial infarction)    [place] (hard of hearing)    Elevated troponin           Assessment & Plan        Hypoxic respiratory insufficiency currently on 3 L  COVID-19  Acute kidney injury metabolic acidosis  Elevated troponin rule out acute coronary syndrome  Patient reported to have positive influenza B at the other facility however the test at our facility was negative     Elevated troponin could be related to acute kidney injury cannot rule out acute coronary syndrome         Plan    Decadron 6 mg p.o. daily  Bicarb drip     Nephrology following  Hold lisinopril    Cardiology consultation for elevated troponin which could be related to acute kidney injury  Bronchodilators  Oxygen titration  DVT prophylaxis  GI prophylaxis  Glycemic control               COVID-19 LAB PANEL     COVID-19 test result  COVID19   Date Value Ref Range Status   08/09/2022 Detected (C) Not Detected - Ref. Range Final       COVID-19 Prognostic lab results  WBC with differential  Results from last 7 days   Lab Units 08/10/22  0511 08/09/22  0422   WBC 10*3/mm3 5.10 5.50   PLATELETS 10*3/mm3 189 196   NEUTROPHIL % % 59.4 84.6*   LYMPHOCYTE % % 26.0 11.4*   NEUTROS ABS 10*3/mm3 3.00 4.60   LYMPHS ABS 10*3/mm3 1.30 0.60*     Inflammatory markers  Results from last 7 days   Lab Units 08/10/22  0511 08/09/22  1532 08/09/22  1004 08/09/22  0422   CRP mg/dL 2.21*  --   --  2.67*   FERRITIN ng/mL 882.80*  --   --   --    CK TOTAL U/L  --   --   --  168   D DIMER QUANT mg/L (FEU) 1.64*  --   --   --    PROCALCITONIN ng/mL 0.16  --   --  0.28*   LDH U/L 195  --   --   --    ALK PHOS U/L 68  --   --  80   AST (SGOT) U/L 35  --   --  44*   ALT (SGPT) U/L 30  --   --  36   TROPONIN T ng/mL 0.055* 0.050*  0.059* 0.066*       Poor COVID-19 outcomes associated with:  Neutrophil:Lymphocyte ratio >3.5  Thrombocytopenia, lymphopenia  LFT's >5x upper limit of normal  LDH >400   LOS: 1 day         Vital signs for last 24 hours:  Vitals:    08/10/22 0300 08/10/22 0400 08/10/22 0500 08/10/22 0600   BP: 142/70 147/77 157/84 162/90   Pulse: 64 56 50 60   Resp:       Temp:  97.2 °F (36.2 °C)     TempSrc:  Rectal     SpO2: 95% 97% 100%    Weight:       Height:           Intake/Output last 3 shifts:  I/O last 3 completed shifts:  In: 1632 [P.O.:480; I.V.:1152]  Out: 2375 [Urine:2375]  Intake/Output this shift:  No intake/output data recorded.    Vent settings for last 24 hours:       Hemodynamic parameters for last 24 hours:       Radiology  Imaging Results (Last 24 Hours)     ** No results found for the last 24 hours. **          Labs:  Results from last 7 days   Lab Units 08/10/22  0511   WBC 10*3/mm3 5.10   HEMOGLOBIN g/dL 11.8*   HEMATOCRIT % 34.6*   PLATELETS 10*3/mm3 189     Results from last 7 days   Lab Units 08/10/22  0511   SODIUM mmol/L 144   POTASSIUM mmol/L 4.4   CHLORIDE mmol/L 105   CO2 mmol/L 26.0   BUN mg/dL 77*   CREATININE mg/dL 3.28*   CALCIUM mg/dL 8.9   BILIRUBIN mg/dL 0.4   ALK PHOS U/L 68   ALT (SGPT) U/L 30   AST (SGOT) U/L 35   GLUCOSE mg/dL 163*         Results from last 7 days   Lab Units 08/10/22  0511 08/09/22  0422   ALBUMIN g/dL 3.50 3.70     Results from last 7 days   Lab Units 08/10/22  0511 08/09/22  1532 08/09/22  1004 08/09/22  0422   CK TOTAL U/L  --   --   --  168   TROPONIN T ng/mL 0.055* 0.050* 0.059* 0.066*         Results from last 7 days   Lab Units 08/10/22  0511   MAGNESIUM mg/dL 2.0     Results from last 7 days   Lab Units 08/09/22  0422   INR  1.21*   APTT seconds 32.9*               Meds:   SCHEDULE  dexamethasone, 6 mg, Oral, Daily With Breakfast  enoxaparin, 30 mg, Subcutaneous, Q24H  fenofibrate, 48 mg, Oral, Daily  insulin lispro, 0-14 Units, Subcutaneous, 4x Daily With Meals &  Nightly  pantoprazole, 40 mg, Oral, Q AM  pregabalin, 150 mg, Oral, TID  sertraline, 100 mg, Oral, Daily  sodium chloride, 10 mL, Intravenous, Q12H  traZODone, 50 mg, Oral, Nightly      Infusions  sodium chloride, 50 mL/hr      PRNs  •  acetaminophen **OR** acetaminophen  •  aluminum-magnesium hydroxide-simethicone  •  dextrose  •  dextrose  •  glucagon (human recombinant)  •  HYDROcodone-acetaminophen  •  nitroglycerin  •  ondansetron **OR** ondansetron  •  sodium chloride    Physical Exam:  Physical Exam  Cardiovascular:      Heart sounds: Murmur heard.         ROS  Review of Systems   Respiratory: Negative for cough and shortness of breath.          Critical Care Time greater than: 45 Minutes  Total time spent with patient greater than: 45 Minutes

## 2022-08-11 LAB
ALBUMIN SERPL-MCNC: 3.4 G/DL (ref 3.5–5.2)
ALBUMIN/GLOB SERPL: 1.4 G/DL
ALP SERPL-CCNC: 63 U/L (ref 39–117)
ALT SERPL W P-5'-P-CCNC: 30 U/L (ref 1–41)
ANION GAP SERPL CALCULATED.3IONS-SCNC: 11 MMOL/L (ref 5–15)
AST SERPL-CCNC: 38 U/L (ref 1–40)
BASOPHILS # BLD AUTO: 0 10*3/MM3 (ref 0–0.2)
BASOPHILS NFR BLD AUTO: 0.1 % (ref 0–1.5)
BILIRUB SERPL-MCNC: 0.4 MG/DL (ref 0–1.2)
BUN SERPL-MCNC: 63 MG/DL (ref 8–23)
BUN/CREAT SERPL: 24.7 (ref 7–25)
CALCIUM SPEC-SCNC: 8.6 MG/DL (ref 8.6–10.5)
CHLORIDE SERPL-SCNC: 106 MMOL/L (ref 98–107)
CO2 SERPL-SCNC: 27 MMOL/L (ref 22–29)
CREAT SERPL-MCNC: 2.55 MG/DL (ref 0.76–1.27)
CRP SERPL-MCNC: 0.95 MG/DL (ref 0–0.5)
DEPRECATED RDW RBC AUTO: 47.3 FL (ref 37–54)
EGFRCR SERPLBLD CKD-EPI 2021: 24 ML/MIN/1.73
EOSINOPHIL # BLD AUTO: 0 10*3/MM3 (ref 0–0.4)
EOSINOPHIL NFR BLD AUTO: 0 % (ref 0.3–6.2)
ERYTHROCYTE [DISTWIDTH] IN BLOOD BY AUTOMATED COUNT: 14.7 % (ref 12.3–15.4)
FERRITIN SERPL-MCNC: 898.1 NG/ML (ref 30–400)
GLOBULIN UR ELPH-MCNC: 2.5 GM/DL
GLUCOSE BLDC GLUCOMTR-MCNC: 109 MG/DL (ref 70–105)
GLUCOSE BLDC GLUCOMTR-MCNC: 205 MG/DL (ref 70–105)
GLUCOSE BLDC GLUCOMTR-MCNC: 268 MG/DL (ref 70–105)
GLUCOSE BLDC GLUCOMTR-MCNC: 92 MG/DL (ref 70–105)
GLUCOSE SERPL-MCNC: 99 MG/DL (ref 65–99)
HBA1C MFR BLD: 7.1 % (ref 3.5–5.6)
HCT VFR BLD AUTO: 35.2 % (ref 37.5–51)
HGB BLD-MCNC: 11.7 G/DL (ref 13–17.7)
LDH SERPL-CCNC: 187 U/L (ref 135–225)
LYMPHOCYTES # BLD AUTO: 1.4 10*3/MM3 (ref 0.7–3.1)
LYMPHOCYTES NFR BLD AUTO: 21.3 % (ref 19.6–45.3)
MAGNESIUM SERPL-MCNC: 1.9 MG/DL (ref 1.6–2.4)
MCH RBC QN AUTO: 30.1 PG (ref 26.6–33)
MCHC RBC AUTO-ENTMCNC: 33.1 G/DL (ref 31.5–35.7)
MCV RBC AUTO: 90.8 FL (ref 79–97)
MONOCYTES # BLD AUTO: 0.9 10*3/MM3 (ref 0.1–0.9)
MONOCYTES NFR BLD AUTO: 14.4 % (ref 5–12)
NEUTROPHILS NFR BLD AUTO: 4.1 10*3/MM3 (ref 1.7–7)
NEUTROPHILS NFR BLD AUTO: 64.2 % (ref 42.7–76)
NRBC BLD AUTO-RTO: 0.2 /100 WBC (ref 0–0.2)
PHOSPHATE SERPL-MCNC: 3.8 MG/DL (ref 2.5–4.5)
PLATELET # BLD AUTO: 164 10*3/MM3 (ref 140–450)
PMV BLD AUTO: 9.6 FL (ref 6–12)
POTASSIUM SERPL-SCNC: 4.3 MMOL/L (ref 3.5–5.2)
PROCALCITONIN SERPL-MCNC: 0.11 NG/ML (ref 0–0.25)
PROT SERPL-MCNC: 5.9 G/DL (ref 6–8.5)
RBC # BLD AUTO: 3.88 10*6/MM3 (ref 4.14–5.8)
SODIUM SERPL-SCNC: 144 MMOL/L (ref 136–145)
WBC NRBC COR # BLD: 6.4 10*3/MM3 (ref 3.4–10.8)

## 2022-08-11 PROCEDURE — 97530 THERAPEUTIC ACTIVITIES: CPT

## 2022-08-11 PROCEDURE — 82962 GLUCOSE BLOOD TEST: CPT

## 2022-08-11 PROCEDURE — 84100 ASSAY OF PHOSPHORUS: CPT | Performed by: NURSE PRACTITIONER

## 2022-08-11 PROCEDURE — 85025 COMPLETE CBC W/AUTO DIFF WBC: CPT | Performed by: NURSE PRACTITIONER

## 2022-08-11 PROCEDURE — 97535 SELF CARE MNGMENT TRAINING: CPT

## 2022-08-11 PROCEDURE — 63710000001 DEXAMETHASONE PER 0.25 MG: Performed by: INTERNAL MEDICINE

## 2022-08-11 PROCEDURE — 97166 OT EVAL MOD COMPLEX 45 MIN: CPT

## 2022-08-11 PROCEDURE — 99231 SBSQ HOSP IP/OBS SF/LOW 25: CPT | Performed by: INTERNAL MEDICINE

## 2022-08-11 PROCEDURE — 83735 ASSAY OF MAGNESIUM: CPT | Performed by: NURSE PRACTITIONER

## 2022-08-11 PROCEDURE — 84145 PROCALCITONIN (PCT): CPT | Performed by: INTERNAL MEDICINE

## 2022-08-11 PROCEDURE — 94799 UNLISTED PULMONARY SVC/PX: CPT

## 2022-08-11 PROCEDURE — 99232 SBSQ HOSP IP/OBS MODERATE 35: CPT | Performed by: INTERNAL MEDICINE

## 2022-08-11 PROCEDURE — 97162 PT EVAL MOD COMPLEX 30 MIN: CPT

## 2022-08-11 PROCEDURE — 80053 COMPREHEN METABOLIC PANEL: CPT | Performed by: NURSE PRACTITIONER

## 2022-08-11 PROCEDURE — 25010000002 HYDRALAZINE PER 20 MG: Performed by: NURSE PRACTITIONER

## 2022-08-11 PROCEDURE — 86140 C-REACTIVE PROTEIN: CPT | Performed by: INTERNAL MEDICINE

## 2022-08-11 PROCEDURE — 25010000002 ENOXAPARIN PER 10 MG: Performed by: NURSE PRACTITIONER

## 2022-08-11 PROCEDURE — 82728 ASSAY OF FERRITIN: CPT | Performed by: INTERNAL MEDICINE

## 2022-08-11 PROCEDURE — 63710000001 INSULIN LISPRO (HUMAN) PER 5 UNITS: Performed by: INTERNAL MEDICINE

## 2022-08-11 PROCEDURE — 83036 HEMOGLOBIN GLYCOSYLATED A1C: CPT | Performed by: INTERNAL MEDICINE

## 2022-08-11 PROCEDURE — 83615 LACTATE (LD) (LDH) ENZYME: CPT | Performed by: INTERNAL MEDICINE

## 2022-08-11 RX ORDER — AMLODIPINE BESYLATE 5 MG/1
5 TABLET ORAL
Status: DISCONTINUED | OUTPATIENT
Start: 2022-08-11 | End: 2022-08-16

## 2022-08-11 RX ADMIN — TRAZODONE HYDROCHLORIDE 50 MG: 50 TABLET ORAL at 20:48

## 2022-08-11 RX ADMIN — AMLODIPINE BESYLATE 5 MG: 5 TABLET ORAL at 11:12

## 2022-08-11 RX ADMIN — INSULIN LISPRO 8 UNITS: 100 INJECTION, SOLUTION INTRAVENOUS; SUBCUTANEOUS at 20:48

## 2022-08-11 RX ADMIN — FENOFIBRATE 48 MG: 48 TABLET ORAL at 08:06

## 2022-08-11 RX ADMIN — INSULIN LISPRO 5 UNITS: 100 INJECTION, SOLUTION INTRAVENOUS; SUBCUTANEOUS at 17:18

## 2022-08-11 RX ADMIN — SERTRALINE 100 MG: 100 TABLET, FILM COATED ORAL at 08:06

## 2022-08-11 RX ADMIN — Medication 10 ML: at 21:25

## 2022-08-11 RX ADMIN — HYDRALAZINE HYDROCHLORIDE 10 MG: 20 INJECTION INTRAMUSCULAR; INTRAVENOUS at 05:42

## 2022-08-11 RX ADMIN — SODIUM CHLORIDE 50 ML/HR: 9 INJECTION, SOLUTION INTRAVENOUS at 07:29

## 2022-08-11 RX ADMIN — Medication 100 MG: at 08:06

## 2022-08-11 RX ADMIN — OXYCODONE HYDROCHLORIDE AND ACETAMINOPHEN 1000 MG: 500 TABLET ORAL at 08:06

## 2022-08-11 RX ADMIN — PREGABALIN 150 MG: 75 CAPSULE ORAL at 08:06

## 2022-08-11 RX ADMIN — ENOXAPARIN SODIUM 30 MG: 100 INJECTION SUBCUTANEOUS at 16:07

## 2022-08-11 RX ADMIN — PREGABALIN 150 MG: 75 CAPSULE ORAL at 20:48

## 2022-08-11 RX ADMIN — ZINC SULFATE 220 MG (50 MG) CAPSULE 220 MG: CAPSULE at 08:06

## 2022-08-11 RX ADMIN — Medication 10 ML: at 08:06

## 2022-08-11 RX ADMIN — PREGABALIN 150 MG: 75 CAPSULE ORAL at 16:07

## 2022-08-11 RX ADMIN — DEXAMETHASONE 6 MG: 4 TABLET ORAL at 08:06

## 2022-08-11 RX ADMIN — ACETAMINOPHEN 650 MG: 325 TABLET, FILM COATED ORAL at 16:07

## 2022-08-11 RX ADMIN — Medication 5000 UNITS: at 08:06

## 2022-08-11 RX ADMIN — PANTOPRAZOLE SODIUM 40 MG: 40 TABLET, DELAYED RELEASE ORAL at 05:42

## 2022-08-11 NOTE — PROGRESS NOTES
"                                                                                                                                      Nephrology  Progress Note                                        [facility]    Patient Identification    Name: Shalom Alexis  Age: 87 y.o.  Sex: male  :  1935  MRN: [id number]      DATE OF SERVICE:  2022        Subective    Resting   Up on chair  Objective   Scheduled Meds:ascorbic acid, 1,000 mg, Oral, Daily  dexamethasone, 6 mg, Oral, Daily With Breakfast  enoxaparin, 30 mg, Subcutaneous, Q24H  fenofibrate, 48 mg, Oral, Daily  insulin lispro, 0-14 Units, Subcutaneous, 4x Daily With Meals & Nightly  pantoprazole, 40 mg, Oral, Q AM  pregabalin, 150 mg, Oral, TID  sertraline, 100 mg, Oral, Daily  sodium chloride, 10 mL, Intravenous, Q12H  thiamine, 100 mg, Oral, Daily  traZODone, 50 mg, Oral, Nightly  cholecalciferol, 5,000 Units, Oral, Daily  zinc sulfate, 220 mg, Oral, Daily          Continuous Infusions:sodium chloride, 50 mL/hr, Last Rate: 50 mL/hr (08/10/22 1137)        PRN Meds:•  acetaminophen **OR** acetaminophen  •  aluminum-magnesium hydroxide-simethicone  •  dextrose  •  dextrose  •  glucagon (human recombinant)  •  hydrALAZINE  •  HYDROcodone-acetaminophen  •  nitroglycerin  •  ondansetron **OR** ondansetron  •  sodium chloride     Exam:  /98   Pulse 68   Temp 95.8 °F (35.4 °C) (Oral)   Resp 15   Ht 185.4 cm (73\")   Wt 99.9 kg (220 lb 3.8 oz)   SpO2 94%   BMI 29.06 kg/m²     Intake/Output last 3 shifts:  I/O last 3 completed shifts:  In:  [P.O.:840; I.V.:1152]  Out: 3625 [Urine:3625]    Intake/Output this shift:  I/O this shift:  In: -   Out: 1300 [Urine:1300]    Physical exam:  General Appearance:  no distress   ithout obvious abnormality, atraumatic  Eyes:  PERRL, conjunctiva/corneas clear     Neck:  Supple,  no adenopathy;      Lungs:  Decreased BS occasion ronchi  Heart:  Regular rate and rhythm, S1 and S2 " normal  Abdomen:  Soft, non-tender, bowel sounds active   Extremities: trace edema  Pulses: 2+ and symmetric all extremities  Skin:  No rashes or lesions       Data Review:  All labs (24hrs):   Recent Results (from the past 24 hour(s))   POC Glucose Once    Collection Time: 08/10/22  8:03 AM    Specimen: Blood   Result Value Ref Range    Glucose 155 (H) 70 - 105 mg/dL   Troponin    Collection Time: 08/10/22 11:04 AM    Specimen: Blood   Result Value Ref Range    Troponin T 0.067 (C) 0.000 - 0.030 ng/mL   POC Glucose Once    Collection Time: 08/10/22 11:24 AM    Specimen: Blood   Result Value Ref Range    Glucose 153 (H) 70 - 105 mg/dL   ECG 12 Lead    Collection Time: 08/10/22  1:44 PM   Result Value Ref Range    QT Interval 463 ms   POC Glucose Once    Collection Time: 08/10/22  5:01 PM    Specimen: Blood   Result Value Ref Range    Glucose 258 (H) 70 - 105 mg/dL   POC Glucose Once    Collection Time: 08/10/22  8:04 PM    Specimen: Blood   Result Value Ref Range    Glucose 226 (H) 70 - 105 mg/dL   CBC Auto Differential    Collection Time: 08/11/22  4:59 AM    Specimen: Blood   Result Value Ref Range    WBC 6.40 3.40 - 10.80 10*3/mm3    RBC 3.88 (L) 4.14 - 5.80 10*6/mm3    Hemoglobin 11.7 (L) 13.0 - 17.7 g/dL    Hematocrit 35.2 (L) 37.5 - 51.0 %    MCV 90.8 79.0 - 97.0 fL    MCH 30.1 26.6 - 33.0 pg    MCHC 33.1 31.5 - 35.7 g/dL    RDW 14.7 12.3 - 15.4 %    RDW-SD 47.3 37.0 - 54.0 fl    MPV 9.6 6.0 - 12.0 fL    Platelets 164 140 - 450 10*3/mm3    Neutrophil % 64.2 42.7 - 76.0 %    Lymphocyte % 21.3 19.6 - 45.3 %    Monocyte % 14.4 (H) 5.0 - 12.0 %    Eosinophil % 0.0 (L) 0.3 - 6.2 %    Basophil % 0.1 0.0 - 1.5 %    Neutrophils, Absolute 4.10 1.70 - 7.00 10*3/mm3    Lymphocytes, Absolute 1.40 0.70 - 3.10 10*3/mm3    Monocytes, Absolute 0.90 0.10 - 0.90 10*3/mm3    Eosinophils, Absolute 0.00 0.00 - 0.40 10*3/mm3    Basophils, Absolute 0.00 0.00 - 0.20 10*3/mm3    nRBC 0.2 0.0 - 0.2 /100 WBC          Imaging:  XR Chest  1 View    Result Date: 8/9/2022  Right basilar atelectasis  Electronically Signed By-Tony De La Torre On:8/9/2022 7:48 AM This report was finalized on [id number] by  Tony De La Torre, .      Assessment/Plan:     Acute renal failure (ARF) (HCC)    Acute hypoxemic respiratory failure due to COVID-19 (HCC)    Diarrhea of presumed infectious origin    Hematochezia    Metabolic acidosis    CAD (coronary artery disease)    History of MI (myocardial infarction)    [place] (hard of hearing)    Elevated troponin     Acute kidney injury  Metabolic acidosis  Hyperkalemia  COVID-19 pneumonia  Elevated troponin  Hyperglycemia  Hard of hearing      Creatinine trending down further down 2.5  Acidosis improving  DC bicarb drip  Currently on half-normal saline at 50 cc an hour

## 2022-08-11 NOTE — CASE MANAGEMENT/SOCIAL WORK
Continued Stay Note   [place]     Patient Name: Shalom Alexis  MRN: [id number]  Today's Date: 8/11/2022    Admit Date: 8/9/2022     Discharge Plan     Row Name 08/11/22 1323       Plan    Plan DC Plan: SNF pending patient selections. Choices given to patient 8/11/22. Covid + 8/9/22    Provided Post Acute Provider List? N/A    Provided Post Acute Provider Quality & Resource List? N/A    Plan Comments CM spoke with patient’s nurse and also reviewed chart documentation to obtain clinical updates. PT/OT recommending SNF placement at discharge. CM searched a 20 mile radius of Skilled Nursing Facilities from patients home address. [facility], [facility], and [facility] were obtained through Epic search. CM contacted admissions at each facility to inquire if they are accepting Covid + patients at this time. [facility] and [facility] both state they are not taking new patients with Covid. CM was unable to reach a person at [facility]. Voicemail was left for Tay with CM return phone call information. CM wrote down that patient's local facilities are not accepting patients with Covid and provided choices of [facility] [place] as options for SNF. CM gave this note to bedside nurse to take into room on her next round and deliver to patient secondary to isolation status. CM will follow up with choices.CM will continue to follow for any additional needs that may develop and adjust discharge plan accordingly. DC Barriers: IVF's, Renal function monitoring, and pending Echocardiogram results.              Expected Discharge Date and Time     Expected Discharge Date Expected Discharge Time    Aug 13, 2022         Phone communication or documentation only- no physical contact with patient or family.      Bailey Alvarez RN     Office Phone: [phone]  Office Cell:     [phone]

## 2022-08-11 NOTE — PLAN OF CARE
Goal Outcome Evaluation:  Plan of Care Reviewed With: patient           Outcome Evaluation: Pt is an 87 y.o. male transferred from [facility] where he tested positive for COVID and had acute renal failure and hypoxic respiratory insufficiency. PMH is significant for CAD, MI and [place].  Mr. Alexis lives with his daughter who works outside of the home full-time. They have an apt with no ELLEN.  He reports that he is independent with mobility within the home using SPC but uses w/c for mobility outside of home.  Upon PT evaluation he demonstrates marked BLE weakness. He transfers with min A but is unable to wt shift to a single leg to initiate a step.  He had an episode of incontinence in standing requiring total assist to clean up.  He is functioning below his baseline and would benefit from skilled PT prior to return home.

## 2022-08-11 NOTE — PLAN OF CARE
Goal Outcome Evaluation:  Plan of Care Reviewed With: patient            88 yo male who presented to urgent care where he was the transferred to [facility]. There he tested positive for COVID and was found to have an acute kidney injury. He was then stat flighted to [facility] for further care. At baseline, patient reports he lives with his daughter who work full time. They live in a 1st floor apartment where he reports he used a can inside the home, and a wheelchair when traveling outside of home. He reports he was independent with ADLs, and still does some cooking for himself. Other home management is performed by his daughter. Today,patient presents with significant weakness. Re requires max assist for lower body dressing, and anticipate the same for all lower body self care. He was able to stand with CGA, however then required min HHA to maintain standing position without AD. He was able to take 3 steps before becoming incontinent of urine and returning to chair. He was then assisted with hygiene and donning clean socks. Patient reports bladder incontinence is normal for him. Patient is well below functional baseline. OT will follow, and recommends SNF at d/c.

## 2022-08-11 NOTE — PLAN OF CARE
Goal Outcome Evaluation:  Patient is a PCU overflow. A/O x4 but hard of hearing. Remains on 4 liters NC. VSS. Diet advanced to regular diet. Pt worked with PT/OT.  will help patient with d/c placement for a skilled rehab facility. Family updated on patient status via phone call.

## 2022-08-11 NOTE — PROGRESS NOTES
Cardiology Progress Note    Patient Identification:  Name: Shalom Alexis  Age: 87 y.o.  Sex: male  :  1935  MRN: [id number]                 Follow Up / Chief Complaint: Elevated troponin    Interval History: Patient is here with acute hypoxemic respiratory failure due to COVID-19 infection and JOSEMANUEL was found to have elevated troponin.       Subjective: Patient seen and chart reviewed.  Labs reviewed.      Objective:  2022: Troponin 0.059-- 0.050--0.067    History of Present Illness:          Mr. Shalom Alexis has PMH of     CAD details of which are not available  History of MI  Diabetes  Hypertension  COPD  Hard of hearing  Back surgery  Non-smoker  NKDA  Family history unknown to the patient     Presented 2022 with complaint of abdominal pain and diarrhea decreased p.o. intake feeling bad all over to [facility] in [facility] was positive for COVID and flu B.  Was found to have acute renal failure with BUN/creatinine of 101/5.3 and hypoxemia requiring oxygen.  Work-up revealed CT of the chest revealing thoracic aortic aneurysm.  Cardiology was consulted because of elevated troponin of 0.059.  Patient is a poor historian due to being hard of hearing.     Data 8/10/2022: Hemoglobin 11.8, CRP 2.2, troponin 0.059-0.061  Chest x-ray right-sided atelectasis  EKG done today reviewed/interpreted by me reveals sinus rhythm with rate of 60 bpm with IVCD        Assessment:  :     Elevated troponin at 0.055-stress test 0.067  Acute hypoxemic respiratory failure due to COVID-19 infection/acute kidney injury/diarrhea  Thoracic aortic aneurysm  History of CAD, hypertension, diabetes        Recommendations / Plan:         Telemetry is revealing sinus rhythm  EKG is revealing no acute ischemic changes  Patient troponin elevation is probably due to kidney injury.  There is no specific trend seen.  We will follow-up as outpatient in 2 weeks and consider ischemic work-up.  Discussed with  "RN taking care of patient to coordinate care  Please call me back if I can be of any further assistance       Past Medical History:  Past Medical History:   Diagnosis Date   • CAD (coronary artery disease)    • COPD (chronic obstructive pulmonary disease) (HCC)    • Diabetes mellitus (HCC)    • History of MI (myocardial infarction)    • [place] (hard of hearing)    • Hypertension      Past Surgical History:  Past Surgical History:   Procedure Laterality Date   • BACK SURGERY          Social History:   Social History     Tobacco Use   • Smoking status: Former Smoker   • Smokeless tobacco: Never Used   Substance Use Topics   • Alcohol use: Not Currently      Family History:  History reviewed. No pertinent family history.       Allergies:  No Known Allergies  Scheduled Meds:  ascorbic acid, 1,000 mg, Daily  dexamethasone, 6 mg, Daily With Breakfast  enoxaparin, 30 mg, Q24H  fenofibrate, 48 mg, Daily  insulin lispro, 0-14 Units, 4x Daily With Meals & Nightly  pantoprazole, 40 mg, Q AM  pregabalin, 150 mg, TID  sertraline, 100 mg, Daily  sodium chloride, 10 mL, Q12H  thiamine, 100 mg, Daily  traZODone, 50 mg, Nightly  cholecalciferol, 5,000 Units, Daily  zinc sulfate, 220 mg, Daily          Review of Systems:   ROS  Review of Systems   Constitution: Negative for chills and fever.   Cardiovascular: Negative for chest pain and palpitations.   Respiratory: Negative for cough and hemoptysis.    Gastrointestinal: Negative for nausea.        Constitutional:  Temp:  [95.8 °F (35.4 °C)-97.5 °F (36.4 °C)] 95.9 °F (35.5 °C)  Heart Rate:  [48-76] 58  Resp:  [16] 16  BP: (109-173)/(67-98) 158/74    Physical Exam   /74   Pulse 58   Temp 95.9 °F (35.5 °C) (Oral)   Resp 16   Ht 185.4 cm (73\")   Wt 99.9 kg (220 lb 3.8 oz)   SpO2 100%   BMI 29.06 kg/m²     General:  Appears in no acute distress  Eyes: Sclera is anicteric,  conjunctiva is clear   HEENT:  Thyroid not visibly enlarged. No mucosal pallor or cyanosis  Respiratory: " "Respirations regular and unlabored at rest.  Skin: Color pink.   Neuro: Alert and awake.    INTAKE AND OUTPUT:    Intake/Output Summary (Last 24 hours) at 8/11/2022 0912  Last data filed at 8/11/2022 0800  Gross per 24 hour   Intake 360 ml   Output 2950 ml   Net -2590 ml       Cardiographics  Telemetry: Sinus    ECG:   ECG 12 Lead   Preliminary Result   HEART RATE= 60  bpm   RR Interval= 996  ms   TX Interval= 170  ms   P Horizontal Axis= 15  deg   P Front Axis= 48  deg   QRSD Interval= 123  ms   QT Interval= 463  ms   QRS Axis= -25  deg   T Wave Axis= 21  deg   - ABNORMAL ECG -   Sinus rhythm   Nonspecific intraventricular conduction delay   No previous ECG available for comparison   Electronically Signed By:    Date and Time of Study: 2022-08-10 13:44:33      ECG 12 Lead    (Results Pending)     I have personally reviewed EKG    Echocardiogram:     Lab Review   I have reviewed the labs  Results from last 7 days   Lab Units 08/10/22  1104 08/10/22  0511 08/09/22  1532 08/09/22  1004 08/09/22  0422   CK TOTAL U/L  --   --   --   --  168   TROPONIN T ng/mL 0.067* 0.055* 0.050*   < > 0.066*    < > = values in this interval not displayed.     Results from last 7 days   Lab Units 08/11/22  0459   MAGNESIUM mg/dL 1.9     Results from last 7 days   Lab Units 08/11/22  0459   SODIUM mmol/L 144   POTASSIUM mmol/L 4.3   BUN mg/dL 63*   CREATININE mg/dL 2.55*   CALCIUM mg/dL 8.6         Results from last 7 days   Lab Units 08/11/22  0459 08/10/22  0511 08/09/22  0422   WBC 10*3/mm3 6.40 5.10 5.50   HEMOGLOBIN g/dL 11.7* 11.8* 11.7*   HEMATOCRIT % 35.2* 34.6* 35.3*   PLATELETS 10*3/mm3 164 189 196     Results from last 7 days   Lab Units 08/09/22  0422   INR  1.21*   APTT seconds 32.9*       RADIOLOGY:  Imaging Results (Last 24 Hours)     ** No results found for the last 24 hours. **                )8/11/2022  MD TEJAL Matson/Transcription:   \"Dictated utilizing Dragon dictation\".   "

## 2022-08-11 NOTE — DISCHARGE PLACEMENT REQUEST
"Annmarie Harrison (87 y.o. Male)             Date of Birth   1935    Social Security Number       Address   [address] [place] [address]  [address] [place] [address]    Home Phone   [phone]    MRN   [id number]       [employer]   [employer]    Marital Status   Unknown                            Admission Date   8/9/22    Admission Type   Urgent    Admitting Provider   Tucker Gonzalez MD    Attending Provider   Valerie Feliz MD    Department, Room/Bed   [facility] INTENSIVE CARE UNIT, 2301/1       Discharge Date       Discharge Disposition       Discharge Destination                               Attending Provider: Valerie Feliz MD    Allergies: No Known Allergies    Isolation: Enh Drop/Con, Contact   Infection: COVID (confirmed) (08/09/22), 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)   Code Status: CPR   Advance Care Planning Activity    Ht: 185.4 cm (73\")   Wt: 99.9 kg (220 lb 3.8 oz)    Admission Cmt: None   Principal Problem: Acute renal failure (ARF) (HCC) [N17.9]                 Active Insurance as of 8/9/2022     Primary Coverage     Payor Plan Insurance Group Employer/Plan Group    MEDICARE MEDICARE A & B      Payor Plan Address Payor Plan Phone Number Payor Plan Fax Number Effective Dates    [address] [phone]  3/1/1999 - None Entered    [facility] [address]       Subscriber Name Subscriber Birth Date Member ID       ANNMARIE HARRISON 1935 [id number]           Secondary Coverage     Payor Plan Insurance Group Employer/Plan Group    [place]      Payor Plan Address Payor Plan Phone Number Payor Plan Fax Number Effective Dates    [address]   3/23/2016 - None Entered    [facility] [phone]       Subscriber Name Subscriber Birth Date Member ID       ANNMARIE HARRISON 1935 [id number]                 Emergency Contacts      (Rel.) Home Phone Work Phone Mobile Phone    SIRISHA HARRISON (Spouse) -- -- [phone]               History & Physical      Draw, " MD Tucker at 08/09/22 1058              Patient Care Team:  Ron Hensley MD as PCP - General (Family Medicine)    Chief complaint COVID-19        Assessment & Plan     Hypoxic respiratory insufficiency currently on 4 L  COVID-19  Acute kidney injury metabolic acidosis  Elevated troponin rule out acute coronary syndrome        Plan  Decadron 6 mg p.o. daily  Bicarb drip    Nephrology following  Hold lisinopril  Bronchodilators  Oxygen titration  DVT prophylaxis  GI prophylaxis  Glycemic control            COVID-19 LAB PANEL     COVID-19 test result  COVID19   Date Value Ref Range Status   08/09/2022 Detected (C) Not Detected - Ref. Range Final       COVID-19 Prognostic lab results  WBC with differential  Results from last 7 days   Lab Units 08/09/22  0422   WBC 10*3/mm3 5.50   PLATELETS 10*3/mm3 196   NEUTROPHIL % % 84.6*   LYMPHOCYTE % % 11.4*   NEUTROS ABS 10*3/mm3 4.60   LYMPHS ABS 10*3/mm3 0.60*     Inflammatory markers  Results from last 7 days   Lab Units 08/09/22  1004 08/09/22  0422   CRP mg/dL  --  2.67*   CK TOTAL U/L  --  168   PROCALCITONIN ng/mL  --  0.28*   ALK PHOS U/L  --  80   AST (SGOT) U/L  --  44*   ALT (SGPT) U/L  --  36   TROPONIN T ng/mL 0.059* 0.066*       Poor COVID-19 outcomes associated with:  Neutrophil:Lymphocyte ratio >3.5  Thrombocytopenia, lymphopenia  LFT's >5x upper limit of normal  LDH >400  History      87-year-old white male patient who initially presented to our urgent care because of not feeling good and having abdominal discomfort where he was sent to the emergency room of [facility] where he was found to have COVID and acute kidney injury with a creatinine 5.4 potassium 5.1 with metabolic acidosis CO2 14 was stat flighted to our facility        JOSEMANUEL (acute kidney injury)    Lab test positive for detection of COVID-19 virus    Influenza due to influenza virus, type B    Diarrhea of presumed infectious origin    Hematochezia    Metabolic acidosis    Acute  renal failure (ARF) (HCC)    CAD (coronary artery disease)    History of MI (myocardial infarction)    [place] (hard of hearing)      Past Medical History:   Diagnosis Date   • CAD (coronary artery disease)    • COPD (chronic obstructive pulmonary disease) (HCC)    • Diabetes mellitus (HCC)    • History of MI (myocardial infarction)    • [place] (hard of hearing)    • Hypertension        Past Surgical History:   Procedure Laterality Date   • BACK SURGERY         History reviewed. No pertinent family history.    Social History     Socioeconomic History   • Marital status: Unknown   Tobacco Use   • Smoking status: Former Smoker   • Smokeless tobacco: Never Used   Vaping Use   • Vaping Use: Never used   Substance and Sexual Activity   • Alcohol use: Not Currently   • Drug use: Never   • Sexual activity: Defer       Review of Systems  Review of Systems   HENT: Positive for hearing loss.    Respiratory: Positive for cough and shortness of breath.         Vital Signs  Temp:  [96.7 °F (35.9 °C)-98.6 °F (37 °C)] 96.7 °F (35.9 °C)  Heart Rate:  [57-93] 57  Resp:  [18] 18  BP: ()/(55-76) 94/55    Physical Exam:  Physical Exam  Cardiovascular:      Heart sounds: Murmur heard.   Pulmonary:      Breath sounds: Rhonchi present.           Radiology  Imaging Results (Last 24 Hours)     Procedure Component Value Units Date/Time    XR Chest 1 View [[id number]] Collected: 08/09/22 0748     Updated: 08/09/22 0750    Narrative:      DATE OF EXAM:  8/9/2022 5:20 AM     PROCEDURE:  XR CHEST 1 VW-     INDICATIONS:  SHORTNESS OF BREATH     COMPARISON:  No Comparisons Available     TECHNIQUE:   Single radiographic AP view of the chest was obtained.     FINDINGS:  Heart size borderline. Pulmonary vasculature appears within normal  limits. Strandy airspace disease in the right lung base. Left lung  grossly clear        Impression:      Right basilar atelectasis     Electronically Signed By-Tony De La Torre On:8/9/2022 7:48 AM  This report was  finalized on [id number] by  Tony De La Torre, .          Labs:  Results from last 7 days   Lab Units 08/09/22  0422   WBC 10*3/mm3 5.50   HEMOGLOBIN g/dL 11.7*   HEMATOCRIT % 35.3*   PLATELETS 10*3/mm3 196     Results from last 7 days   Lab Units 08/09/22  1004 08/09/22  0422   SODIUM mmol/L 142 139   POTASSIUM mmol/L 4.6 5.0   CHLORIDE mmol/L 111* 107   CO2 mmol/L 19.0* 16.0*   BUN mg/dL 95* 95*   CREATININE mg/dL 4.08* 4.44*   CALCIUM mg/dL 8.6 8.4*   BILIRUBIN mg/dL  --  0.3   ALK PHOS U/L  --  80   ALT (SGPT) U/L  --  36   AST (SGOT) U/L  --  44*   GLUCOSE mg/dL 265* 316*         Results from last 7 days   Lab Units 08/09/22  0422   ALBUMIN g/dL 3.70     Results from last 7 days   Lab Units 08/09/22  1004 08/09/22  0422   CK TOTAL U/L  --  168   TROPONIN T ng/mL 0.059* 0.066*         Results from last 7 days   Lab Units 08/09/22  0422   MAGNESIUM mg/dL 1.8     Results from last 7 days   Lab Units 08/09/22  0422   INR  1.21*   APTT seconds 32.9*               Meds:   SCHEDULE  famotidine, 20 mg, Intravenous, Daily  insulin lispro, 0-9 Units, Subcutaneous, TID AC  sodium chloride, 10 mL, Intravenous, Q12H      Infusions  custom IV infusion builder, , Last Rate: 150 mL/hr at 08/09/22 0948      PRNs  •  acetaminophen **OR** acetaminophen  •  aluminum-magnesium hydroxide-simethicone  •  dextrose  •  dextrose  •  glucagon (human recombinant)  •  nitroglycerin  •  ondansetron **OR** ondansetron  •  sodium chloride      I discussed the patients findings and my recommendations with patient and primary care team.     Tucker Gonzalez MD  08/09/22  10:58 EDT    Time: Critical care 70 min    Electronically signed by Tucker Gonzalez MD at 08/09/22 9587

## 2022-08-11 NOTE — THERAPY EVALUATION
Patient Name: Shalom Alexis  : 1935    MRN: [id number]                              Today's Date: 2022       Admit Date: 2022    Visit Dx: No diagnosis found.  Patient Active Problem List   Diagnosis   • Acute hypoxemic respiratory failure due to COVID-19 (HCC)   • Diarrhea of presumed infectious origin   • Hematochezia   • Metabolic acidosis   • Acute renal failure (ARF) (Formerly [facility])   • CAD (coronary artery disease)   • History of MI (myocardial infarction)   • [place] (hard of hearing)   • Elevated troponin     Past Medical History:   Diagnosis Date   • CAD (coronary artery disease)    • COPD (chronic obstructive pulmonary disease) (HCC)    • Diabetes mellitus (Formerly [facility])    • History of MI (myocardial infarction)    • [place] (hard of hearing)    • Hypertension      Past Surgical History:   Procedure Laterality Date   • BACK SURGERY        General Information     Row Name 22 1106          OT Time and Intention    Document Type evaluation  -     Mode of Treatment occupational therapy;co-treatment;physical therapy  -     Row Name 22 1106          General Information    Patient Profile Reviewed yes  -LS     Prior Level of Function independent:;ADL's;cooking;w/c or scooter;all household mobility  Uses cane inside the house, a wheelchair when he leaves the home  -     Existing Precautions/Restrictions fall;oxygen therapy device and L/min  -     Row Name 22 1106          Living Environment    People in Home child(true), adult  -     Row Name 22 1106          Home Main Entrance    Number of Stairs, Main Entrance none  -LS     Stair Railings, Main Entrance none  -LS     Row Name 22 1106          Cognition    Orientation Status (Cognition) oriented x 3  -     Row Name 22 1106          Safety Issues, Functional Mobility    Impairments Affecting Function (Mobility) balance;endurance/activity tolerance;pain;shortness of breath;strength  -           User Key  (r) = Recorded By,  (t) = Taken By, (c) = Cosigned By    Initials Name Provider Type     Chuck Meredith OT Occupational Therapist                 Mobility/ADL's     Row Name 08/11/22 1109          Bed Mobility    Comment, (Bed Mobility) NT, in chair upon arrival  -     Row Name 08/11/22 1109          Transfers    Transfers sit-stand transfer  -     Sit-Stand [place] (Transfers) contact guard  -     Row Name 08/11/22 1109          Sit-Stand Transfer    Comment, (Sit-Stand Transfer) hand held assist inés in standing  -     Row Name 08/11/22 1109          Activities of Daily Living    BADL Assessment/Intervention lower body dressing  -     Row Name 08/11/22 1109          Lower Body Dressing Assessment/Training    [place] Level (Lower Body Dressing) doff;don;socks;maximum assist (25% patient effort)  -     Position (Lower Body Dressing) supported sitting  -           User Key  (r) = Recorded By, (t) = Taken By, (c) = Cosigned By    Initials Name Provider Type     Chuck Meredith OT Occupational Therapist               Obj/Interventions     Row Name 08/11/22 1111          Range of Motion Comprehensive    General Range of Motion bilateral upper extremity ROM WFL  -     Row Name 08/11/22 1111          Strength Comprehensive (MMT)    Comment, General Manual Muscle Testing (MMT) Assessment BUE strength grossly 3+/5  -LS     Row Name 08/11/22 1111          Balance    Balance Assessment sitting static balance;sitting dynamic balance;standing static balance;sit to stand dynamic balance;standing dynamic balance  -LS     Static Sitting Balance independent  -LS     Dynamic Sitting Balance standby assist  -LS     Position, Sitting Balance sitting in chair  -LS     Static Standing Balance minimal assist  -LS     Dynamic Standing Balance minimal assist  -LS     Position/Device Used, Standing Balance supported  -LS     Balance Interventions sitting;standing;sit to stand;occupation based/functional task  -           User Key   (r) = Recorded By, (t) = Taken By, (c) = Cosigned By    Initials Name Provider Type    LS Chuck Meredith OT Occupational Therapist               Goals/Plan     Row Name 08/11/22 1126          Bed Mobility Goal 1 (OT)    Activity/Assistive Device (Bed Mobility Goal 1, OT) bed mobility activities, all  -LS     [place] Level/Cues Needed (Bed Mobility Goal 1, OT) contact guard required  -LS     Time Frame (Bed Mobility Goal 1, OT) long term goal (LTG);2 weeks  -     Row Name 08/11/22 1126          Transfer Goal 1 (OT)    Activity/Assistive Device (Transfer Goal 1, OT) sit-to-stand/stand-to-sit;bed-to-chair/chair-to-bed;toilet  -LS     [place] Level/Cues Needed (Transfer Goal 1, OT) modified independence  -LS     Time Frame (Transfer Goal 1, OT) long term goal (LTG);2 weeks  -     Row Name 08/11/22 1126          Dressing Goal 1 (OT)    Activity/Device (Dressing Goal 1, OT) upper body dressing;lower body dressing  -LS     [place]/Cues Needed (Dressing Goal 1, OT) contact guard required  -LS     Time Frame (Dressing Goal 1, OT) long term goal (LTG);2 weeks  -     Row Name 08/11/22 1126          Therapy Assessment/Plan (OT)    Planned Therapy Interventions (OT) activity tolerance training;BADL retraining;functional balance retraining;occupation/activity based interventions;ROM/therapeutic exercise;strengthening exercise;transfer/mobility retraining;neuromuscular control/coordination retraining  -           User Key  (r) = Recorded By, (t) = Taken By, (c) = Cosigned By    Initials Name Provider Type    Chuck Murillo OT Occupational Therapist               Clinical Impression     Row Name 08/11/22 1113          Pain Assessment    Pretreatment Pain Rating 2/10  -LS     Posttreatment Pain Rating 2/10  -LS     Pain Location generalized  -LS     Pre/Posttreatment Pain Comment Aches and stiffness  -LS     Pain Intervention(s) Therapeutic presence;Repositioned;Ambulation/increased activity  -     Row  Name 08/11/22 1113          Plan of Care Review    Plan of Care Reviewed With patient  -     Outcome Evaluation 88 yo male who presented to urgent care where he was the transferred to [facility]. There he tested positive for COVID and was found to have an acute kidney injury. He was then stat flighted to [facility] for further care. At baseline, patient reports he lives with his daughter who work full time. They live in a 1st floor apartment where he reports he used a can inside the home, and a wheelchair when traveling outside of home. He reports he was independent with ADLs, and still does some cooking for himself. Other home management is performed by his daughter. Today,patient presents with significant weakness. Re requires max assist for lower body dressing, and anticipate the same for all lower body self care. He was able to stand with CGA, however then required min HHA to maintain standing position without AD. He was able to take 3 steps before becoming incontinent of urine and returning to chair. He was then assisted with hygiene and donning clean socks. Patient reports bladder incontinence is normal for him. Patient is well below functional baseline. OT will follow, and recommends SNF at d/c.  -     Row Name 08/11/22 1113          Therapy Assessment/Plan (OT)    Rehab Potential (OT) good, to achieve stated therapy goals  -     Criteria for Skilled Therapeutic Interventions Met (OT) yes;skilled treatment is necessary  -     Therapy Frequency (OT) 3 times/wk  -     Predicted Duration of Therapy Intervention (OT) 2 weeks  -     Row Name 08/11/22 1113          Therapy Plan Review/Discharge Plan (OT)    Anticipated Discharge Disposition (OT) skilled nursing facility  -     Row Name 08/11/22 1113          Vital Signs    Pre SpO2 (%) 96  -LS     O2 Delivery Pre Treatment supplemental O2  4L  -LS     Intra SpO2 (%) 94  -LS     O2 Delivery Intra Treatment supplemental O2  4L  -LS     Post SpO2  (%) 98  -LS     O2 Delivery Post Treatment supplemental O2  4L  -LS     Pre Patient Position Sitting  -LS     Intra Patient Position Standing  -LS     Post Patient Position Sitting  -LS     Row Name 08/11/22 1113          Positioning and Restraints    Pre-Treatment Position sitting in chair/recliner  -LS     Post Treatment Position chair  -LS     In Chair notified nsg;sitting;call light within reach;encouraged to call for assist;exit alarm on  -LS           User Key  (r) = Recorded By, (t) = Taken By, (c) = Cosigned By    Initials Name Provider Type    Chuck Murillo OT Occupational Therapist               Outcome Measures     Row Name 08/11/22 1127          How much help from another is currently needed...    Putting on and taking off regular lower body clothing? 1  -LS     Bathing (including washing, rinsing, and drying) 2  -LS     Toileting (which includes using toilet bed pan or urinal) 2  -LS     Putting on and taking off regular upper body clothing 2  -LS     Taking care of personal grooming (such as brushing teeth) 3  -LS     Eating meals 4  -LS     AM-PAC 6 Clicks Score (OT) 14  -LS     Row Name 08/11/22 1127          Functional Assessment    Outcome Measure Options AM-PAC 6 Clicks Daily Activity (OT)  -LS           User Key  (r) = Recorded By, (t) = Taken By, (c) = Cosigned By    Initials Name Provider Type    Chuck Murillo OT Occupational Therapist                Occupational Therapy Education                 Title: PT OT SLP Therapies (Done)     Topic: Occupational Therapy (Done)     Point: ADL training (Done)     Description:   Instruct learner(s) on proper safety adaptation and remediation techniques during self care or transfers.   Instruct in proper use of assistive devices.              Learning Progress Summary           Patient Acceptance, E,TB, VU by RAMONA at 8/11/2022 1127                   Point: Precautions (Done)     Description:   Instruct learner(s) on prescribed precautions during  self-care and functional transfers.              Learning Progress Summary           Patient Acceptance, E,TB, VU by  at 8/11/2022 1127                               User Key     Initials Effective Dates Name Provider Type Discipline    RAMONA 03/01/22 -  Chuck Meredith OT Occupational Therapist OT              OT Recommendation and Plan  Planned Therapy Interventions (OT): activity tolerance training, BADL retraining, functional balance retraining, occupation/activity based interventions, ROM/therapeutic exercise, strengthening exercise, transfer/mobility retraining, neuromuscular control/coordination retraining  Therapy Frequency (OT): 3 times/wk  Plan of Care Review  Plan of Care Reviewed With: patient  Outcome Evaluation: 86 yo male who presented to urgent care where he was the transferred to [facility]. There he tested positive for COVID and was found to have an acute kidney injury. He was then stat flighted to [facility] for further care. At baseline, patient reports he lives with his daughter who work full time. They live in a 1st floor apartment where he reports he used a can inside the home, and a wheelchair when traveling outside of home. He reports he was independent with ADLs, and still does some cooking for himself. Other home management is performed by his daughter. Today,patient presents with significant weakness. Re requires max assist for lower body dressing, and anticipate the same for all lower body self care. He was able to stand with CGA, however then required min HHA to maintain standing position without AD. He was able to take 3 steps before becoming incontinent of urine and returning to chair. He was then assisted with hygiene and donning clean socks. Patient reports bladder incontinence is normal for him. Patient is well below functional baseline. OT will follow, and recommends SNF at d/c.     Time Calculation:    Time Calculation- OT     Row Name 08/11/22 1128             Time  Calculation- OT    OT Start Time 0927  -      OT Stop Time 0958  -      OT Time Calculation (min) 31 min  -LS      Total Timed Code Minutes- OT 10 minute(s)  -LS      OT Received On 08/11/22  -      OT - Next Appointment 08/13/22  -      OT Goal Re-Cert Due Date 08/25/22  -              Timed Charges    50316 - OT Self Care/Mgmt Minutes 10  -LS              Untimed Charges    OT Eval/Re-eval Minutes 21  -LS              Total Minutes    Timed Charges Total Minutes 10  -LS      Untimed Charges Total Minutes 21  -LS       Total Minutes 31  -LS            User Key  (r) = Recorded By, (t) = Taken By, (c) = Cosigned By    Initials Name Provider Type     Maggy Meredith OT Occupational Therapist              Therapy Charges for Today     Code Description Service Date Service Provider Modifiers Qty    12489210735 HC OT SELF CARE/MGMT/TRAIN EA 15 MIN 8/11/2022 Maggy Meredith OT GO 1    12940109779 HC OT EVAL MOD COMPLEXITY 3 8/11/2022 Maggy Meredith OT GO 1               MAGGY MEREDITH OT  8/11/2022

## 2022-08-11 NOTE — PROGRESS NOTES
"    [facility] Medicine Services Daily Progress Note    Patient Name: Shalom Alexis  : 1935  MRN: [id number]  Primary Care Physician:  Ron Hensley MD  Date of admission: 2022      Subjective      Chief Complaint: 2022: Labs improving with BUN 95, creatinine 4.08. Nephrology managing Bicarb drip. Troponin elevated 0.066 trending down to 0.059. Pt stated he is feeling \"better\" and denied any specific complaints. Very hard of hearing but alert and oriented x3. Hospitalist group consulted for medical management     2022  Patient Reports no new complaints or acute events.    Review of Systems   All other systems reviewed and are negative.                                                                                    \      Objective      Vitals:   Temp:  [95.8 °F (35.4 °C)-97.5 °F (36.4 °C)] 97.3 °F (36.3 °C)  Heart Rate:  [50-76] 76  Resp:  [16] 16  BP: (108-158)/(52-98) 115/60  Flow (L/min):  [4] 4    Physical Exam   Vital signs reviewed.  Nursing notes reviewed.  General: well-developed and well-nourished, NAD  HEENT: NC/AT, EOMI, PERRLA, [place]  Heart: RRR. No murmur   Chest: CTAB, no w/r/r, normal respiratory effort  Abdominal: Soft. NT/ND. Bowel sounds present  Musculoskeletal: Normal ROM.  No edema. No calf tenderness.  Neurological: AAOx3, no focal deficits  Skin: Skin is warm and dry. No rash  Psychiatric: Normal mood and affect.       Result Review    Result Review:  I have personally reviewed the results from the time of this admission to 2022 18:07 EDT and agree with these findings:  [x]  Laboratory  [x]  Microbiology  [x]  Radiology  [x]  EKG/Telemetry   [x]  Cardiology/Vascular   []  Pathology  []  Old records  []  Other:  Most notable findings include:       COVID-19 LAB PANEL     COVID-19 test result  COVID19   Date Value Ref Range Status   2022 Detected (C) Not Detected - Ref. Range Final       COVID-19 Prognostic lab results  WBC with " "differential  Results from last 7 days   Lab Units 08/11/22  0459 08/10/22  0511 08/09/22  0422   WBC 10*3/mm3 6.40 5.10 5.50   PLATELETS 10*3/mm3 164 189 196   NEUTROPHIL % % 64.2 59.4 84.6*   LYMPHOCYTE % % 21.3 26.0 11.4*   NEUTROS ABS 10*3/mm3 4.10 3.00 4.60   LYMPHS ABS 10*3/mm3 1.40 1.30 0.60*     Inflammatory markers  Results from last 7 days   Lab Units 08/11/22  0459 08/10/22  1104 08/10/22  0511 08/09/22  1532 08/09/22  1004 08/09/22  0422   CRP mg/dL 0.95*  --  2.21*  --   --  2.67*   FERRITIN ng/mL 898.10*  --  882.80*  --   --   --    CK TOTAL U/L  --   --   --   --   --  168   D DIMER QUANT mg/L (FEU)  --   --  1.64*  --   --   --    PROCALCITONIN ng/mL 0.11  --  0.16  --   --  0.28*   LDH U/L 187  --  195  --   --   --    ALK PHOS U/L 63  --  68  --   --  80   AST (SGOT) U/L 38  --  35  --   --  44*   ALT (SGPT) U/L 30  --  30  --   --  36   TROPONIN T ng/mL  --  0.067* 0.055* 0.050* 0.059* 0.066*       Poor COVID-19 outcomes associated with:  Neutrophil:Lymphocyte ratio >3.5  Thrombocytopenia, lymphopenia  LFT's >5x upper limit of normal  LDH >400      Assessment & Plan      Brief Patient Summary:  Shalom Alexis is a 87 y.o. male who tested positive for COVID-19 and flu B at [facility].  He presented with complaints of abdominal pain, diarrhea, decreased oral intake, and \"feeling bad all over.\"  Apparently the diarrhea has been going on for over 2 weeks.  He was found to have acute renal failure with BUN of 101 and creatinine 5.3 potassium 5.1.  He was also reportedly hypoxic requiring 2 L O2.        amLODIPine, 5 mg, Oral, Q24H  ascorbic acid, 1,000 mg, Oral, Daily  dexamethasone, 6 mg, Oral, Daily With Breakfast  enoxaparin, 30 mg, Subcutaneous, Q24H  fenofibrate, 48 mg, Oral, Daily  insulin lispro, 0-14 Units, Subcutaneous, 4x Daily With Meals & Nightly  pantoprazole, 40 mg, Oral, Q AM  pregabalin, 150 mg, Oral, TID  sertraline, 100 mg, Oral, " Daily  sodium chloride, 10 mL, Intravenous, Q12H  thiamine, 100 mg, Oral, Daily  traZODone, 50 mg, Oral, Nightly  cholecalciferol, 5,000 Units, Oral, Daily  zinc sulfate, 220 mg, Oral, Daily       sodium chloride, 50 mL/hr, Last Rate: 50 mL/hr (08/11/22 0729)         Active Hospital Problems:  Active Hospital Problems    Diagnosis    • **Acute renal failure (ARF) (HCC)    • Acute hypoxemic respiratory failure due to COVID-19 (HCC)    • Diarrhea of presumed infectious origin    • Elevated troponin    • CAD (coronary artery disease)    • History of MI (myocardial infarction)    • [place] (hard of hearing)    • Hematochezia    • Metabolic acidosis      Plan:   Acute hypoxemic respiratory failure due to Covid-19  -- Too late for Remdesivir  -- Dexamethasone x10 days  -- Supplemental O2 as needed  -- Monitor lab trends  -- Pulmonology following     Acute renal failure  Metabolic acidosis  --Hold ACE inhibitor, Lasix  -- Nephrology following     Diarrhea - improving  --GI panel negative  --C. difficile negative  --Maintain hydration and electrolyte     Elevated troponin  -- ?  Demand ischemia vs kidney disease                                           DM Type II with hyperglycemia  -- glimepiride and januvia held on admission  -- [employer] AC/HS  -- SSI coverage as needed  -- check HgbA1c     GERD  --continue pantoprazole     Depression  --continue sertraline      Hard of hearing     DVT prophylaxis:  Medical and mechanical DVT prophylaxis orders are present.    CODE STATUS:    Code Status (Patient has no pulse and is not breathing): CPR (Attempt to Resuscitate)  Medical Interventions (Patient has pulse or is breathing): Full Support      Disposition:  I expect patient to be discharged pending clinical course and consultant recommendations.    This patient has been examined wearing appropriate Personal Protective Equipment. 08/11/22      Electronically signed by Valerie Feliz MD, 08/11/22, 18:07 EDT.  Vaibhav Serra  Hospitalist Team

## 2022-08-11 NOTE — PROGRESS NOTES
ICU Daily Progress Note        Acute renal failure (ARF) (HCC)    Acute hypoxemic respiratory failure due to COVID-19 (HCC)    Diarrhea of presumed infectious origin    Hematochezia    Metabolic acidosis    CAD (coronary artery disease)    History of MI (myocardial infarction)    [place] (hard of hearing)    Elevated troponin           Assessment & Plan        Hypoxic respiratory insufficiency currently on 3 L  COVID-19  Acute kidney injury metabolic acidosis  Elevated troponin rule out acute coronary syndrome  Patient reported to have positive influenza B at the other facility however the test at our facility was negative     Elevated troponin could be related to acute kidney injury cannot rule out acute coronary syndrome         Plan    Decadron wean  Bicarb drip off     Nephrology following  Hold lisinopril  Amlodipine 5 mg  Cardiology consulted for elevated troponin which could be related to acute kidney injury  Bronchodilators  Oxygen titration  DVT prophylaxis  GI prophylaxis  Glycemic control               COVID-19 LAB PANEL     COVID-19 test result  COVID19   Date Value Ref Range Status   08/09/2022 Detected (C) Not Detected - Ref. Range Final       COVID-19 Prognostic lab results  WBC with differential  Results from last 7 days   Lab Units 08/11/22  0459 08/10/22  0511 08/09/22  0422   WBC 10*3/mm3 6.40 5.10 5.50   PLATELETS 10*3/mm3 164 189 196   NEUTROPHIL % % 64.2 59.4 84.6*   LYMPHOCYTE % % 21.3 26.0 11.4*   NEUTROS ABS 10*3/mm3 4.10 3.00 4.60   LYMPHS ABS 10*3/mm3 1.40 1.30 0.60*     Inflammatory markers  Results from last 7 days   Lab Units 08/11/22  0459 08/10/22  1104 08/10/22  0511 08/09/22  1532 08/09/22  1004 08/09/22  0422   CRP mg/dL 0.95*  --  2.21*  --   --  2.67*   FERRITIN ng/mL 898.10*  --  882.80*  --   --   --    CK TOTAL U/L  --   --   --   --   --  168   D DIMER QUANT mg/L (FEU)  --   --  1.64*  --   --   --    PROCALCITONIN ng/mL 0.11  --  0.16  --   --  0.28*   LDH U/L 187  --  195  --    --   --    ALK PHOS U/L 63  --  68  --   --  80   AST (SGOT) U/L 38  --  35  --   --  44*   ALT (SGPT) U/L 30  --  30  --   --  36   TROPONIN T ng/mL  --  0.067* 0.055* 0.050* 0.059* 0.066*       Poor COVID-19 outcomes associated with:  Neutrophil:Lymphocyte ratio >3.5  Thrombocytopenia, lymphopenia  LFT's >5x upper limit of normal  LDH >400   LOS: 2 days         Vital signs for last 24 hours:  Vitals:    08/11/22 0700 08/11/22 0800 08/11/22 0900 08/11/22 1000   BP: 137/75 158/74 116/63 115/60   Pulse: 56 58 62 59   Resp: 16      Temp: 95.9 °F (35.5 °C)      TempSrc: Oral      SpO2: 100% 100% 100% 97%   Weight:       Height:           Intake/Output last 3 shifts:  I/O last 3 completed shifts:  In: 360 [P.O.:360]  Out: 2800 [Urine:2800]  Intake/Output this shift:  I/O this shift:  In: -   Out: 400 [Urine:400]    Vent settings for last 24 hours:       Hemodynamic parameters for last 24 hours:       Radiology  Imaging Results (Last 24 Hours)     ** No results found for the last 24 hours. **          Labs:  Results from last 7 days   Lab Units 08/11/22  0459   WBC 10*3/mm3 6.40   HEMOGLOBIN g/dL 11.7*   HEMATOCRIT % 35.2*   PLATELETS 10*3/mm3 164     Results from last 7 days   Lab Units 08/11/22  0459   SODIUM mmol/L 144   POTASSIUM mmol/L 4.3   CHLORIDE mmol/L 106   CO2 mmol/L 27.0   BUN mg/dL 63*   CREATININE mg/dL 2.55*   CALCIUM mg/dL 8.6   BILIRUBIN mg/dL 0.4   ALK PHOS U/L 63   ALT (SGPT) U/L 30   AST (SGOT) U/L 38   GLUCOSE mg/dL 99         Results from last 7 days   Lab Units 08/11/22  0459 08/10/22  0511 08/09/22  0422   ALBUMIN g/dL 3.40* 3.50 3.70     Results from last 7 days   Lab Units 08/10/22  1104 08/10/22  0511 08/09/22  1532 08/09/22  1004 08/09/22  0422   CK TOTAL U/L  --   --   --   --  168   TROPONIN T ng/mL 0.067* 0.055* 0.050*   < > 0.066*    < > = values in this interval not displayed.         Results from last 7 days   Lab Units 08/11/22  0459   MAGNESIUM mg/dL 1.9     Results from last 7 days    Lab Units 08/09/22  0422   INR  1.21*   APTT seconds 32.9*               Meds:   SCHEDULE  ascorbic acid, 1,000 mg, Oral, Daily  dexamethasone, 6 mg, Oral, Daily With Breakfast  enoxaparin, 30 mg, Subcutaneous, Q24H  fenofibrate, 48 mg, Oral, Daily  insulin lispro, 0-14 Units, Subcutaneous, 4x Daily With Meals & Nightly  pantoprazole, 40 mg, Oral, Q AM  pregabalin, 150 mg, Oral, TID  sertraline, 100 mg, Oral, Daily  sodium chloride, 10 mL, Intravenous, Q12H  thiamine, 100 mg, Oral, Daily  traZODone, 50 mg, Oral, Nightly  cholecalciferol, 5,000 Units, Oral, Daily  zinc sulfate, 220 mg, Oral, Daily      Infusions  sodium chloride, 50 mL/hr, Last Rate: 50 mL/hr (08/11/22 0729)      PRNs  •  acetaminophen **OR** acetaminophen  •  aluminum-magnesium hydroxide-simethicone  •  dextrose  •  dextrose  •  glucagon (human recombinant)  •  hydrALAZINE  •  HYDROcodone-acetaminophen  •  nitroglycerin  •  ondansetron **OR** ondansetron  •  sodium chloride    Physical Exam:  Physical Exam  Cardiovascular:      Heart sounds: Murmur heard.         ROS  Review of Systems   Respiratory: Negative for cough and shortness of breath.          Critical Care Time greater than: 45 Minutes  Total time spent with patient greater than: 45 Minutes

## 2022-08-11 NOTE — THERAPY EVALUATION
Patient Name: Shalom Alexis  : 1935    MRN: [id number]                              Today's Date: 2022       Admit Date: 2022    Visit Dx: No diagnosis found.  Patient Active Problem List   Diagnosis   • Acute hypoxemic respiratory failure due to COVID-19 (HCC)   • Diarrhea of presumed infectious origin   • Hematochezia   • Metabolic acidosis   • Acute renal failure (ARF) (HCC)   • CAD (coronary artery disease)   • History of MI (myocardial infarction)   • [place] (hard of hearing)   • Elevated troponin     Past Medical History:   Diagnosis Date   • CAD (coronary artery disease)    • COPD (chronic obstructive pulmonary disease) (HCC)    • Diabetes mellitus (HCC)    • History of MI (myocardial infarction)    • [place] (hard of hearing)    • Hypertension      Past Surgical History:   Procedure Laterality Date   • BACK SURGERY        General Information     Row Name 22 1146          Physical Therapy Time and Intention    Document Type evaluation  -CL     Mode of Treatment occupational therapy;co-treatment;physical therapy  -CL     Row Name 22 1146          General Information    Patient Profile Reviewed yes  -CL     Prior Level of Function independent:;w/c or scooter;all household mobility  SPC in home; w/c in community  -CL     Existing Precautions/Restrictions fall;oxygen therapy device and L/min  4L  -CL     Barriers to Rehab previous functional deficit;hearing deficit  -CL     Row Name 22 1146          Living Environment    People in Home child(true), adult  -CL     Name(s) of People in Home Rose Marie Solorzano  -CL     Row Name 22 1146          Home Main Entrance    Number of Stairs, Main Entrance none  -CL     Row Name 22 1146          Stairs Within Home, Primary    Number of Stairs, Within Home, Primary none  -CL     Row Name 22 1146          Cognition    Orientation Status (Cognition) oriented x 3  -CL     Row Name 22 1146          Safety Issues, Functional Mobility     Impairments Affecting Function (Mobility) balance;endurance/activity tolerance;pain;shortness of breath;strength  -CL           User Key  (r) = Recorded By, (t) = Taken By, (c) = Cosigned By    Initials Name Provider Type    Sahra Johansen PT Physical Therapist               Mobility     Row Name 08/11/22 1148          Bed Mobility    Comment, (Bed Mobility) Pt up in chair upon arrival  -CL     Row Name 08/11/22 1148          Sit-Stand Transfer    Sit-Stand [place] (Transfers) contact guard  -CL     Row Name 08/11/22 1148          Gait/Stairs (Locomotion)    Distance in Feet (Gait) Unable to initiate step due to BLE weakness/fear of wt shifting to single stance L or R. Performed 3 bouts of standing with HHA 2 or use of bed rails for support  -CL           User Key  (r) = Recorded By, (t) = Taken By, (c) = Cosigned By    Initials Name Provider Type    Sahra Johansen PT Physical Therapist               Obj/Interventions     Row Name 08/11/22 1149          Range of Motion Comprehensive    General Range of Motion bilateral lower extremity ROM WFL  -CL     Row Name 08/11/22 1149          Strength Comprehensive (MMT)    Comment, General Manual Muscle Testing (MMT) Assessment BLEs grossly 3/5  -CL     Row Name 08/11/22 1149          Balance    Static Sitting Balance independent  -CL     Dynamic Sitting Balance supervision  -CL     Position, Sitting Balance sitting in chair  -CL     Static Standing Balance minimal assist  -CL     Dynamic Standing Balance minimal assist  -CL     Position/Device Used, Standing Balance supported  -CL           User Key  (r) = Recorded By, (t) = Taken By, (c) = Cosigned By    Initials Name Provider Type    Sahra Johansen PT Physical Therapist               Goals/Plan     Row Name 08/11/22 1156          Bed Mobility Goal 1 (PT)    Activity/Assistive Device (Bed Mobility Goal 1, PT) bed mobility activities, all  -CL     [place] Level/Cues Needed (Bed Mobility  "Goal 1, PT) modified independence  -CL     Time Frame (Bed Mobility Goal 1, PT) long term goal (LTG);2 weeks  -CL     Row Name 08/11/22 1156          Transfer Goal 1 (PT)    Activity/Assistive Device (Transfer Goal 1, PT) sit-to-stand/stand-to-sit;bed-to-chair/chair-to-bed  -CL     [place] Level/Cues Needed (Transfer Goal 1, PT) modified independence  -CL     Time Frame (Transfer Goal 1, PT) long term goal (LTG);2 weeks  -CL     Row Name 08/11/22 1156          Gait Training Goal 1 (PT)    Activity/Assistive Device (Gait Training Goal 1, PT) gait (walking locomotion);assistive device use  -CL     [place] Level (Gait Training Goal 1, PT) modified independence  -CL     Distance (Gait Training Goal 1, PT) 50'  -CL     Time Frame (Gait Training Goal 1, PT) long term goal (LTG);2 weeks  -CL     Row Name 08/11/22 1156          Therapy Assessment/Plan (PT)    Planned Therapy Interventions (PT) bed mobility training;gait training;balance training;patient/family education;ROM (range of motion);strengthening;transfer training  -CL           User Key  (r) = Recorded By, (t) = Taken By, (c) = Cosigned By    Initials Name Provider Type    Sahra Johansen, PT Physical Therapist               Clinical Impression     Row Name 08/11/22 1150          Pain    Pretreatment Pain Rating 2/10  -CL     Posttreatment Pain Rating 2/10  -CL     Pain Location generalized  -CL     Pre/Posttreatment Pain Comment \"sore all over\"  -CL     Row Name 08/11/22 1150          Plan of Care Review    Plan of Care Reviewed With patient  -CL     Outcome Evaluation Pt is an 87 y.o. male transferred from [facility] where he tested positive for COVID and had acute renal failure and hypoxic respiratory insufficiency. PMH is significant for CAD, MI and [place].  Mr. Alexis lives with his daughter who works outside of the home full-time. They have an apt with no ELLEN.  He reports that he is independent with mobility within the home using " SPC but uses w/c for mobility outside of home.  Upon PT evaluation he demonstrates marked BLE weakness. He transfers with min A but is unable to wt shift to a single leg to initiate a step.  He had an episode of incontinence in standing requiring total assist to clean up.  He is functioning below his baseline and would benefit from skilled PT prior to return home.  -CL     Row Name 08/11/22 1150          Therapy Assessment/Plan (PT)    Rehab Potential (PT) good, to achieve stated therapy goals  -CL     Criteria for Skilled Interventions Met (PT) yes;skilled treatment is necessary  -CL     Therapy Frequency (PT) 5 times/wk  -CL     Row Name 08/11/22 1150          Vital Signs    Pre Systolic BP Rehab 116  -CL     Pre Treatment Diastolic BP 63  -CL     Pretreatment Heart Rate (beats/min) 62  -CL     Pre SpO2 (%) 96  -CL     O2 Delivery Pre Treatment supplemental O2  -CL     Intra SpO2 (%) 94  -CL     O2 Delivery Intra Treatment supplemental O2  -CL     Post SpO2 (%) 98  -CL     O2 Delivery Post Treatment supplemental O2  -CL     Pre Patient Position Sitting  -CL     Intra Patient Position Standing  -CL     Post Patient Position Sitting  -CL     Row Name 08/11/22 1150          Positioning and Restraints    Pre-Treatment Position sitting in chair/recliner  -CL     Post Treatment Position chair  -CL     In Chair notified nsg;sitting;call light within reach;encouraged to call for assist;exit alarm on  -CL           User Key  (r) = Recorded By, (t) = Taken By, (c) = Cosigned By    Initials Name Provider Type    CL Sahra Cagle, PT Physical Therapist               Outcome Measures     Row Name 08/11/22 1156          How much help from another person do you currently need...    Turning from your back to your side while in flat bed without using bedrails? 3  -CL     Moving from lying on back to sitting on the side of a flat bed without bedrails? 3  -CL     Moving to and from a bed to a chair (including a wheelchair)? 2   -CL     Standing up from a chair using your arms (e.g., wheelchair, bedside chair)? 3  -CL     Climbing 3-5 steps with a railing? 1  -CL     To walk in hospital room? 1  -CL     AM-PAC 6 Clicks Score (PT) 13  -CL     Highest level of mobility 4 --> Transferred to chair/commode  -CL     Row Name 08/11/22 1156 08/11/22 1127       Functional Assessment    Outcome Measure Options AM-PAC 6 Clicks Basic Mobility (PT)  -CL AM-PAC 6 Clicks Daily Activity (OT)  -LS          User Key  (r) = Recorded By, (t) = Taken By, (c) = Cosigned By    Initials Name Provider Type    LS Chuck Meredith OT Occupational Therapist    CL Sahra Cagle, PT Physical Therapist                             Physical Therapy Education                 Title: PT OT SLP Therapies (Done)     Topic: Physical Therapy (Done)     Point: Mobility training (Done)     Learning Progress Summary           Patient Acceptance, E,TB, VU by CL at 8/11/2022 1157                   Point: Home exercise program (Done)     Learning Progress Summary           Patient Acceptance, E,TB, VU by CL at 8/11/2022 1157                   Point: Body mechanics (Done)     Learning Progress Summary           Patient Acceptance, E,TB, VU by CL at 8/11/2022 1157                   Point: Precautions (Done)     Learning Progress Summary           Patient Acceptance, E,TB, VU by CL at 8/11/2022 1157                               User Key     Initials Effective Dates Name Provider Type Discipline    CL 03/02/22 -  Sahra Cagle, PT Physical Therapist PT              PT Recommendation and Plan  Planned Therapy Interventions (PT): bed mobility training, gait training, balance training, patient/family education, ROM (range of motion), strengthening, transfer training  Plan of Care Reviewed With: patient  Outcome Evaluation: Pt is an 87 y.o. male transferred from [facility] where he tested positive for COVID and had acute renal failure and hypoxic respiratory insufficiency.  PMH is significant for CAD, MI and [place].  Mr. Alexis lives with his daughter who works outside of the home full-time. They have an apt with no ELLEN.  He reports that he is independent with mobility within the home using SPC but uses w/c for mobility outside of home.  Upon PT evaluation he demonstrates marked BLE weakness. He transfers with min A but is unable to wt shift to a single leg to initiate a step.  He had an episode of incontinence in standing requiring total assist to clean up.  He is functioning below his baseline and would benefit from skilled PT prior to return home.     Time Calculation:    PT Charges     Row Name 08/11/22 1158             Time Calculation    Start Time 0929  -CL      Stop Time 0955  -CL      Time Calculation (min) 26 min  -CL      PT Received On 08/11/22  -CL      PT - Next Appointment 08/12/22  -CL      PT Goal Re-Cert Due Date 08/25/22  -CL              Time Calculation- PT    Total Timed Code Minutes- PT 8 minute(s)  -CL            User Key  (r) = Recorded By, (t) = Taken By, (c) = Cosigned By    Initials Name Provider Type    CL Sahra Cagle, PT Physical Therapist              Therapy Charges for Today     Code Description Service Date Service Provider Modifiers Qty    [id number]  PT EVAL MOD COMPLEXITY 4 8/11/2022 Sahra Cagle, PT GP 1    [id number]  PT THERAPEUTIC ACT EA 15 MIN 8/11/2022 Sahra Cagle, PT GP 1          PT G-Codes  Outcome Measure Options: AM-PAC 6 Clicks Basic Mobility (PT)  AM-PAC 6 Clicks Score (PT): 13  AM-PAC 6 Clicks Score (OT): 14    Sahra Cagle PT  8/11/2022

## 2022-08-12 LAB
ALBUMIN SERPL-MCNC: 3.4 G/DL (ref 3.5–5.2)
ALBUMIN/GLOB SERPL: 1.1 G/DL
ALP SERPL-CCNC: 69 U/L (ref 39–117)
ALT SERPL W P-5'-P-CCNC: 31 U/L (ref 1–41)
ANION GAP SERPL CALCULATED.3IONS-SCNC: 11 MMOL/L (ref 5–15)
AST SERPL-CCNC: 41 U/L (ref 1–40)
BASOPHILS # BLD AUTO: 0 10*3/MM3 (ref 0–0.2)
BASOPHILS NFR BLD AUTO: 0.4 % (ref 0–1.5)
BILIRUB SERPL-MCNC: 0.6 MG/DL (ref 0–1.2)
BUN SERPL-MCNC: 53 MG/DL (ref 8–23)
BUN/CREAT SERPL: 23.7 (ref 7–25)
CALCIUM SPEC-SCNC: 8.9 MG/DL (ref 8.6–10.5)
CHLORIDE SERPL-SCNC: 108 MMOL/L (ref 98–107)
CO2 SERPL-SCNC: 23 MMOL/L (ref 22–29)
CREAT SERPL-MCNC: 2.24 MG/DL (ref 0.76–1.27)
CRP SERPL-MCNC: 1.19 MG/DL (ref 0–0.5)
DEPRECATED RDW RBC AUTO: 45.9 FL (ref 37–54)
EGFRCR SERPLBLD CKD-EPI 2021: 28 ML/MIN/1.73
EOSINOPHIL # BLD AUTO: 0 10*3/MM3 (ref 0–0.4)
EOSINOPHIL NFR BLD AUTO: 0.1 % (ref 0.3–6.2)
ERYTHROCYTE [DISTWIDTH] IN BLOOD BY AUTOMATED COUNT: 14.3 % (ref 12.3–15.4)
FERRITIN SERPL-MCNC: 926.4 NG/ML (ref 30–400)
GLOBULIN UR ELPH-MCNC: 3 GM/DL
GLUCOSE BLDC GLUCOMTR-MCNC: 143 MG/DL (ref 70–105)
GLUCOSE BLDC GLUCOMTR-MCNC: 241 MG/DL (ref 70–105)
GLUCOSE BLDC GLUCOMTR-MCNC: 298 MG/DL (ref 70–105)
GLUCOSE BLDC GLUCOMTR-MCNC: 82 MG/DL (ref 70–105)
GLUCOSE SERPL-MCNC: 87 MG/DL (ref 65–99)
HCT VFR BLD AUTO: 36.9 % (ref 37.5–51)
HGB BLD-MCNC: 12.3 G/DL (ref 13–17.7)
LDH SERPL-CCNC: 215 U/L (ref 135–225)
LYMPHOCYTES # BLD AUTO: 1.6 10*3/MM3 (ref 0.7–3.1)
LYMPHOCYTES NFR BLD AUTO: 19.6 % (ref 19.6–45.3)
MAGNESIUM SERPL-MCNC: 1.7 MG/DL (ref 1.6–2.4)
MCH RBC QN AUTO: 30.4 PG (ref 26.6–33)
MCHC RBC AUTO-ENTMCNC: 33.3 G/DL (ref 31.5–35.7)
MCV RBC AUTO: 91.1 FL (ref 79–97)
MONOCYTES # BLD AUTO: 1 10*3/MM3 (ref 0.1–0.9)
MONOCYTES NFR BLD AUTO: 11.5 % (ref 5–12)
NEUTROPHILS NFR BLD AUTO: 5.8 10*3/MM3 (ref 1.7–7)
NEUTROPHILS NFR BLD AUTO: 68.4 % (ref 42.7–76)
NRBC BLD AUTO-RTO: 0.1 /100 WBC (ref 0–0.2)
PHOSPHATE SERPL-MCNC: 2.5 MG/DL (ref 2.5–4.5)
PLATELET # BLD AUTO: 173 10*3/MM3 (ref 140–450)
PMV BLD AUTO: 10 FL (ref 6–12)
POTASSIUM SERPL-SCNC: 4.4 MMOL/L (ref 3.5–5.2)
PROCALCITONIN SERPL-MCNC: 0.1 NG/ML (ref 0–0.25)
PROT SERPL-MCNC: 6.4 G/DL (ref 6–8.5)
RBC # BLD AUTO: 4.04 10*6/MM3 (ref 4.14–5.8)
SODIUM SERPL-SCNC: 142 MMOL/L (ref 136–145)
WBC NRBC COR # BLD: 8.4 10*3/MM3 (ref 3.4–10.8)

## 2022-08-12 PROCEDURE — 83615 LACTATE (LD) (LDH) ENZYME: CPT | Performed by: INTERNAL MEDICINE

## 2022-08-12 PROCEDURE — 86140 C-REACTIVE PROTEIN: CPT | Performed by: INTERNAL MEDICINE

## 2022-08-12 PROCEDURE — 84100 ASSAY OF PHOSPHORUS: CPT | Performed by: NURSE PRACTITIONER

## 2022-08-12 PROCEDURE — 83735 ASSAY OF MAGNESIUM: CPT | Performed by: NURSE PRACTITIONER

## 2022-08-12 PROCEDURE — 80053 COMPREHEN METABOLIC PANEL: CPT | Performed by: NURSE PRACTITIONER

## 2022-08-12 PROCEDURE — 94799 UNLISTED PULMONARY SVC/PX: CPT

## 2022-08-12 PROCEDURE — 63710000001 INSULIN LISPRO (HUMAN) PER 5 UNITS: Performed by: INTERNAL MEDICINE

## 2022-08-12 PROCEDURE — 85025 COMPLETE CBC W/AUTO DIFF WBC: CPT | Performed by: NURSE PRACTITIONER

## 2022-08-12 PROCEDURE — 25010000002 ENOXAPARIN PER 10 MG: Performed by: NURSE PRACTITIONER

## 2022-08-12 PROCEDURE — 84145 PROCALCITONIN (PCT): CPT | Performed by: INTERNAL MEDICINE

## 2022-08-12 PROCEDURE — 63710000001 DEXAMETHASONE PER 0.25 MG: Performed by: INTERNAL MEDICINE

## 2022-08-12 PROCEDURE — 82728 ASSAY OF FERRITIN: CPT | Performed by: INTERNAL MEDICINE

## 2022-08-12 PROCEDURE — 97116 GAIT TRAINING THERAPY: CPT

## 2022-08-12 PROCEDURE — 82962 GLUCOSE BLOOD TEST: CPT

## 2022-08-12 RX ORDER — DEXAMETHASONE 4 MG/1
2 TABLET ORAL
Status: DISCONTINUED | OUTPATIENT
Start: 2022-08-13 | End: 2022-08-13

## 2022-08-12 RX ADMIN — Medication 10 ML: at 20:03

## 2022-08-12 RX ADMIN — Medication 100 MG: at 08:02

## 2022-08-12 RX ADMIN — PREGABALIN 150 MG: 75 CAPSULE ORAL at 20:03

## 2022-08-12 RX ADMIN — AMLODIPINE BESYLATE 5 MG: 5 TABLET ORAL at 08:03

## 2022-08-12 RX ADMIN — PANTOPRAZOLE SODIUM 40 MG: 40 TABLET, DELAYED RELEASE ORAL at 05:57

## 2022-08-12 RX ADMIN — Medication 10 ML: at 08:03

## 2022-08-12 RX ADMIN — ENOXAPARIN SODIUM 30 MG: 100 INJECTION SUBCUTANEOUS at 16:47

## 2022-08-12 RX ADMIN — DEXAMETHASONE 6 MG: 4 TABLET ORAL at 08:02

## 2022-08-12 RX ADMIN — SERTRALINE 100 MG: 100 TABLET, FILM COATED ORAL at 08:03

## 2022-08-12 RX ADMIN — PREGABALIN 150 MG: 75 CAPSULE ORAL at 16:47

## 2022-08-12 RX ADMIN — Medication 5000 UNITS: at 08:03

## 2022-08-12 RX ADMIN — TRAZODONE HYDROCHLORIDE 50 MG: 50 TABLET ORAL at 20:03

## 2022-08-12 RX ADMIN — ZINC SULFATE 220 MG (50 MG) CAPSULE 220 MG: CAPSULE at 08:02

## 2022-08-12 RX ADMIN — PREGABALIN 150 MG: 75 CAPSULE ORAL at 08:03

## 2022-08-12 RX ADMIN — INSULIN LISPRO 8 UNITS: 100 INJECTION, SOLUTION INTRAVENOUS; SUBCUTANEOUS at 17:31

## 2022-08-12 RX ADMIN — INSULIN LISPRO 5 UNITS: 100 INJECTION, SOLUTION INTRAVENOUS; SUBCUTANEOUS at 20:03

## 2022-08-12 RX ADMIN — FENOFIBRATE 48 MG: 48 TABLET ORAL at 08:02

## 2022-08-12 RX ADMIN — OXYCODONE HYDROCHLORIDE AND ACETAMINOPHEN 1000 MG: 500 TABLET ORAL at 08:02

## 2022-08-12 NOTE — THERAPY TREATMENT NOTE
"Subjective: Pt agreeable to therapeutic plan of care.     Objective:     Bed mobility - N/A or Not attempted. Pt up in chair  Transfers - SBA; needs cues for safe hand placement with transitions  Ambulation - 20 feet x 1, 40' x 1 with RW and Min A. Pt with flexed trunk and knees and narrow ROLAND.  All ambulation performed in room due to COVID precautions.    Seated ex: LAQ x 10, hip flex x 10 each LE.    Vitals: SpO2 89-92% on RA  Throughout session.    Pain: 2 VAS back and knees (chronic)  Education: Provided education on importance of mobility and skilled verbal / tactile cueing throughout intervention. Attempted to educate on importance of ROM for OA management; education somewhat limited by pt's [place] and PT's mask.    Assessment: Shalom Alexis presents with functional mobility impairments which indicate the need for skilled intervention. Tolerating session today without incident. Progressing well with gait. Will continue to follow and progress as tolerated.     Plan/Recommendations:   Moderate Intensity Therapy recommended post-acute care. This is recommended as therapy feels the patient would require 3-4 days per week and wouldn't tolerate \"3 hour daily\" rehab intensity. SNF would be the preferred choice. If the patient does not agree to SNF, arrange HH or OP depending on home bound status. If patient is medically complex, consider LTACH.. Pt requires no DME at discharge.     Pt desires Skilled Rehab placement at discharge. Pt cooperative; agreeable to therapeutic recommendations and plan of care.         Basic Mobility 6-click:  Rollin = Total, A lot = 2, A little = 3; 4 = None  Supine>Sit:   1 = Total, A lot = 2, A little = 3; 4 = None   Sit>Stand with arms:  1 = Total, A lot = 2, A little = 3; 4 = None  Bed>Chair:   1 = Total, A lot = 2, A little = 3; 4 = None  Ambulate in room:  1 = Total, A lot = 2, A little = 3; 4 = None  3-5 Steps with railin = Total, A lot = 2, A little = 3; 4 = " None  Score: 16    Modified [place]: N/A = No pre-op stroke/TIA    Post-Tx Position: Up in Chair and Call light and personal items within reach  PPE: gloves, surgical mask, eyewear protection

## 2022-08-12 NOTE — CASE MANAGEMENT/SOCIAL WORK
Continued Stay Note   [place]     Patient Name: Shalom Alexis  MRN: [id number]  Today's Date: 8/12/2022    Admit Date: 8/9/2022     Discharge Plan     Row Name 08/12/22 8018       Plan    Plan DC Plan: Home with [facility] services pending acceptance. Patient refused SNF placement. Covid + 8/9/22    Plan Comments CM spoke with patient’s nurse and also reviewed chart documentation to obtain clinical updates. Patient nurse reports patient spoke with her about SNF and he refuses to go to rehab. Patient wants to return home with home health. CM provided patient nurse  with home health choices list and list was delivered. Nurse sent SC to CM stating patient has made the following selections 1) [facility] [place], and 2) [facility] [place].CM placed referral in basket for [facility] and  notified of referral.Awaiting confirmation of acceptance or denial at this time.CM will continue to follow for any additional needs that may develop and adjust discharge plan accordingly. DC Barriers: Cardiology consult              Expected Discharge Date and Time     Expected Discharge Date Expected Discharge Time    Aug 15, 2022         Phone communication or documentation only- no physical contact with patient or family.      Bailey Alvarez RN      Office Phone: [phone]  Office Cell:     [phone]

## 2022-08-12 NOTE — PROGRESS NOTES
"                                                                                                                                      Nephrology  Progress Note                                        [facility]    Patient Identification    Name: Shalom Alexis  Age: 87 y.o.  Sex: male  :  1935  MRN: [id number]      DATE OF SERVICE:  2022        Subective    Resting   Up on chair  Objective   Scheduled Meds:amLODIPine, 5 mg, Oral, Q24H  ascorbic acid, 1,000 mg, Oral, Daily  dexamethasone, 6 mg, Oral, Daily With Breakfast  enoxaparin, 30 mg, Subcutaneous, Q24H  fenofibrate, 48 mg, Oral, Daily  insulin lispro, 0-14 Units, Subcutaneous, 4x Daily With Meals & Nightly  pantoprazole, 40 mg, Oral, Q AM  pregabalin, 150 mg, Oral, TID  sertraline, 100 mg, Oral, Daily  sodium chloride, 10 mL, Intravenous, Q12H  thiamine, 100 mg, Oral, Daily  traZODone, 50 mg, Oral, Nightly  cholecalciferol, 5,000 Units, Oral, Daily  zinc sulfate, 220 mg, Oral, Daily          Continuous Infusions:sodium chloride, 50 mL/hr, Last Rate: 50 mL/hr (22)        PRN Meds:•  acetaminophen **OR** acetaminophen  •  aluminum-magnesium hydroxide-simethicone  •  dextrose  •  dextrose  •  glucagon (human recombinant)  •  hydrALAZINE  •  HYDROcodone-acetaminophen  •  nitroglycerin  •  ondansetron **OR** ondansetron  •  sodium chloride     Exam:  /78   Pulse 64   Temp 97.1 °F (36.2 °C) (Axillary)   Resp 14   Ht 185.4 cm (73\")   Wt 99.9 kg (220 lb 3.8 oz)   SpO2 96%   BMI 29.06 kg/m²     Intake/Output last 3 shifts:  I/O last 3 completed shifts:  In: 1558 [P.O.:360; I.V.:1198]  Out: 4050 [Urine:4050]    Intake/Output this shift:  I/O this shift:  In: 1787 [P.O.:960; I.V.:827]  Out: 700 [Urine:700]    Physical exam:  General Appearance:  no distress   ithout obvious abnormality, atraumatic  Eyes:  PERRL, conjunctiva/corneas clear     Neck:  Supple,  no adenopathy;      Lungs:  Decreased BS occasion " [place]  Heart:  Regular rate and rhythm, S1 and S2 normal  Abdomen:  Soft, non-tender, bowel sounds active   Extremities: trace edema  Pulses: 2+ and symmetric all extremities  Skin:  No rashes or lesions       Data Review:  All labs (24hrs):   Recent Results (from the past 24 hour(s))   POC Glucose Once    Collection Time: 08/11/22  7:31 AM    Specimen: Blood   Result Value Ref Range    Glucose 92 70 - 105 mg/dL   POC Glucose Once    Collection Time: 08/11/22 11:13 AM    Specimen: Blood   Result Value Ref Range    Glucose 109 (H) 70 - 105 mg/dL   POC Glucose Once    Collection Time: 08/11/22  5:15 PM    Specimen: Blood   Result Value Ref Range    Glucose 205 (H) 70 - 105 mg/dL   POC Glucose Once    Collection Time: 08/11/22  8:44 PM    Specimen: Blood   Result Value Ref Range    Glucose 268 (H) 70 - 105 mg/dL          Imaging:  XR Chest 1 View    Result Date: 8/9/2022  Right basilar atelectasis  Electronically Signed By-Tony De La Torre On:8/9/2022 7:48 AM This report was finalized on [id number] by  Tony De La Torre, .      Assessment/Plan:     Acute renal failure (ARF) (HCC)    Acute hypoxemic respiratory failure due to COVID-19 (HCC)    Diarrhea of presumed infectious origin    Hematochezia    Metabolic acidosis    CAD (coronary artery disease)    History of MI (myocardial infarction)    [place] (hard of hearing)    Elevated troponin     Acute kidney injury  Metabolic acidosis  Hyperkalemia  COVID-19 pneumonia  Elevated troponin  Hyperglycemia  Hard of hearing      Creatinine trending down further down 2.5 today's labs pending  Acidosis improving  DC bicarb drip  Currently on half-normal saline at 50 cc an hour

## 2022-08-12 NOTE — PLAN OF CARE
"Goal Outcome Evaluation:  Plan of Care Reviewed With: patient         Assessment: Shalom Alexis presents with functional mobility impairments which indicate the need for skilled intervention. Tolerating session today without incident. Progressing well with gait. Will continue to follow and progress as tolerated.      Plan/Recommendations:   Moderate Intensity Therapy recommended post-acute care. This is recommended as therapy feels the patient would require 3-4 days per week and wouldn't tolerate \"3 hour daily\" rehab intensity. SNF would be the preferred choice. If the patient does not agree to SNF, arrange HH or OP depending on home bound status. If patient is medically complex, consider LTACH.. Pt requires no DME at discharge.      Pt desires Skilled Rehab placement at discharge. Pt cooperative; agreeable to therapeutic recommendations and plan of care.      "

## 2022-08-12 NOTE — PROGRESS NOTES
ICU Daily Progress Note        Acute renal failure (ARF) (HCC)    Acute hypoxemic respiratory failure due to COVID-19 (HCC)    Diarrhea of presumed infectious origin    Hematochezia    Metabolic acidosis    CAD (coronary artery disease)    History of MI (myocardial infarction)    [place] (hard of hearing)    Elevated troponin           Assessment & Plan     Hypoxic respiratory insufficiency currently on RA  COVID-19  Acute kidney injury metabolic acidosis  Elevated troponin rule out acute coronary syndrome  Patient reported to have positive influenza B at the other facility however the test at our facility was negative     Elevated troponin could be related to acute kidney injury cannot rule out acute coronary syndrome         Plan    Decadron wean  Bicarb drip off     Nephrology following  Hold lisinopril  Amlodipine 5 mg  Cardiology consulted for elevated troponin which could be related to acute kidney injury  Bronchodilators  Oxygen titration  DVT prophylaxis  GI prophylaxis  Glycemic control               COVID-19 LAB PANEL     COVID-19 test result  COVID19   Date Value Ref Range Status   08/09/2022 Detected (C) Not Detected - Ref. Range Final       COVID-19 Prognostic lab results  WBC with differential  Results from last 7 days   Lab Units 08/11/22  0459 08/10/22  0511 08/09/22  0422   WBC 10*3/mm3 6.40 5.10 5.50   PLATELETS 10*3/mm3 164 189 196   NEUTROPHIL % % 64.2 59.4 84.6*   LYMPHOCYTE % % 21.3 26.0 11.4*   NEUTROS ABS 10*3/mm3 4.10 3.00 4.60   LYMPHS ABS 10*3/mm3 1.40 1.30 0.60*     Inflammatory markers  Results from last 7 days   Lab Units 08/11/22  0459 08/10/22  1104 08/10/22  0511 08/09/22  1532 08/09/22  1004 08/09/22  0422   CRP mg/dL 0.95*  --  2.21*  --   --  2.67*   FERRITIN ng/mL 898.10*  --  882.80*  --   --   --    CK TOTAL U/L  --   --   --   --   --  168   D DIMER QUANT mg/L (FEU)  --   --  1.64*  --   --   --    PROCALCITONIN ng/mL 0.11  --  0.16  --   --  0.28*   LDH U/L 187  --  195  --   --    --    ALK PHOS U/L 63  --  68  --   --  80   AST (SGOT) U/L 38  --  35  --   --  44*   ALT (SGPT) U/L 30  --  30  --   --  36   TROPONIN T ng/mL  --  0.067* 0.055* 0.050* 0.059* 0.066*       Poor COVID-19 outcomes associated with:  Neutrophil:Lymphocyte ratio >3.5  Thrombocytopenia, lymphopenia  LFT's >5x upper limit of normal  LDH >400   LOS: 3 days         Vital signs for last 24 hours:  Vitals:    08/12/22 0500 08/12/22 0600 08/12/22 0700 08/12/22 0800   BP: 113/62 141/78 128/75 120/80   BP Location:       Patient Position:       Pulse: 55 64 71 66   Resp:       Temp:    96.9 °F (36.1 °C)   TempSrc:    Oral   SpO2: 93% 96% 92% 90%   Weight:       Height:           Intake/Output last 3 shifts:  I/O last 3 completed shifts:  In: 2985 [P.O.:960; I.V.:2025]  Out: 3500 [Urine:3500]  Intake/Output this shift:  I/O this shift:  In: -   Out: 400 [Urine:400]    Vent settings for last 24 hours:       Hemodynamic parameters for last 24 hours:       Radiology  Imaging Results (Last 24 Hours)     ** No results found for the last 24 hours. **          Labs:  Results from last 7 days   Lab Units 08/11/22  0459   WBC 10*3/mm3 6.40   HEMOGLOBIN g/dL 11.7*   HEMATOCRIT % 35.2*   PLATELETS 10*3/mm3 164     Results from last 7 days   Lab Units 08/11/22  0459   SODIUM mmol/L 144   POTASSIUM mmol/L 4.3   CHLORIDE mmol/L 106   CO2 mmol/L 27.0   BUN mg/dL 63*   CREATININE mg/dL 2.55*   CALCIUM mg/dL 8.6   BILIRUBIN mg/dL 0.4   ALK PHOS U/L 63   ALT (SGPT) U/L 30   AST (SGOT) U/L 38   GLUCOSE mg/dL 99         Results from last 7 days   Lab Units 08/11/22  0459 08/10/22  0511 08/09/22  0422   ALBUMIN g/dL 3.40* 3.50 3.70     Results from last 7 days   Lab Units 08/10/22  1104 08/10/22  0511 08/09/22  1532 08/09/22  1004 08/09/22  0422   CK TOTAL U/L  --   --   --   --  168   TROPONIN T ng/mL 0.067* 0.055* 0.050*   < > 0.066*    < > = values in this interval not displayed.         Results from last 7 days   Lab Units 08/11/22  0459    MAGNESIUM mg/dL 1.9     Results from last 7 days   Lab Units 08/09/22  0422   INR  1.21*   APTT seconds 32.9*               Meds:   SCHEDULE  amLODIPine, 5 mg, Oral, Q24H  ascorbic acid, 1,000 mg, Oral, Daily  dexamethasone, 6 mg, Oral, Daily With Breakfast  enoxaparin, 30 mg, Subcutaneous, Q24H  fenofibrate, 48 mg, Oral, Daily  insulin lispro, 0-14 Units, Subcutaneous, 4x Daily With Meals & Nightly  pantoprazole, 40 mg, Oral, Q AM  pregabalin, 150 mg, Oral, TID  sertraline, 100 mg, Oral, Daily  sodium chloride, 10 mL, Intravenous, Q12H  thiamine, 100 mg, Oral, Daily  traZODone, 50 mg, Oral, Nightly  cholecalciferol, 5,000 Units, Oral, Daily  zinc sulfate, 220 mg, Oral, Daily      Infusions  sodium chloride, 50 mL/hr, Last Rate: 50 mL/hr (08/11/22 0729)      PRNs  •  acetaminophen **OR** acetaminophen  •  aluminum-magnesium hydroxide-simethicone  •  dextrose  •  dextrose  •  glucagon (human recombinant)  •  hydrALAZINE  •  HYDROcodone-acetaminophen  •  nitroglycerin  •  ondansetron **OR** ondansetron  •  sodium chloride    Physical Exam:  Physical Exam  Cardiovascular:      Heart sounds: Murmur heard.         ROS  Review of Systems   Respiratory: Negative for cough and shortness of breath.          Critical Care Time greater than: 45 Minutes  Total time spent with patient greater than: 45 Minutes

## 2022-08-12 NOTE — PLAN OF CARE
Goal Outcome Evaluation:                 Patient preferred to sleep in chair overnigt. Weaned down to room air, sats in mid 90s on this setting. Complaints of leg cramps/tingling. Voiding without difficulty, BM x 1. VSS.

## 2022-08-12 NOTE — CONSULTS
"Nutrition Services    Patient Name: Shalom Alexis  YOB: 1935  MRN: [id number]  Admission date: 8/9/2022    Comment:  --     PPE Documentation        PPE Worn By Provider gloves, face shield, gown and respirator   PPE Worn By Patient  None      CLINICAL NUTRITION ASSESSMENT      Reason for Assessment 8/12: MST of 3      H&P      Past Medical History:   Diagnosis Date   • CAD (coronary artery disease)    • COPD (chronic obstructive pulmonary disease) (HCC)    • Diabetes mellitus (HCC)    • History of MI (myocardial infarction)    • [place] (hard of hearing)    • Hypertension        Past Surgical History:   Procedure Laterality Date   • BACK SURGERY          Current Problems   Hypoxic respiratory insufficiency     COVID-19    Acute kidney injury metabolic acidosis    Elevated troponin        Encounter Information        Trending Narrative     8/12: Patient discussed in AM rounds.  On room air.  Patient very [place].  RD visited patient at bedside and used written text to communicate.  Patient reports good appetite and not recent weight loss.  No visitors present.       Anthropometrics        Current Height, Weight Height: 185.4 cm (73\")  Weight: 99.9 kg (220 lb 3.8 oz) (08/09/22 0430)       Ideal Body Weight (IBW) 184#   Usual Body Weight (UBW) Unable to obtain from patient        Trending Weight Hx     This admission: 8/12: 220#              PTA: No weight history     Wt Readings from Last 30 Encounters:   08/09/22 0430 99.9 kg (220 lb 3.8 oz)   08/09/22 0331 99.9 kg (220 lb 3.8 oz)      BMI kg/m2 Body mass index is 29.06 kg/m².       Labs        Pertinent Labs    Results from last 7 days   Lab Units 08/11/22  0459 08/10/22  0511 08/09/22  1004 08/09/22  0422   SODIUM mmol/L 144 144 142 139   POTASSIUM mmol/L 4.3 4.4 4.6 5.0   CHLORIDE mmol/L 106 105 111* 107   CO2 mmol/L 27.0 26.0 19.0* 16.0*   BUN mg/dL 63* 77* 95* 95*   CREATININE mg/dL 2.55* 3.28* 4.08* 4.44*   CALCIUM mg/dL 8.6 8.9 8.6 8.4*   BILIRUBIN " mg/dL 0.4 0.4  --  0.3   ALK PHOS U/L 63 68  --  80   ALT (SGPT) U/L 30 30  --  36   AST (SGOT) U/L 38 35  --  44*   GLUCOSE mg/dL 99 163* 265* 316*     Results from last 7 days   Lab Units 08/11/22  0459 08/10/22  0511 08/09/22  0422   MAGNESIUM mg/dL 1.9 2.0 1.8   PHOSPHORUS mg/dL 3.8 4.5 5.0*   HEMOGLOBIN g/dL 11.7* 11.8* 11.7*   HEMATOCRIT % 35.2* 34.6* 35.3*   TRIGLYCERIDES mg/dL  --   --  221*     COVID19   Date Value Ref Range Status   08/09/2022 Detected (C) Not Detected - Ref. Range Final     Lab Results   Component Value Date    HGBA1C 7.1 (H) 08/11/2022        Medications    Scheduled Medications amLODIPine, 5 mg, Oral, Q24H  ascorbic acid, 1,000 mg, Oral, Daily  dexamethasone, 6 mg, Oral, Daily With Breakfast  enoxaparin, 30 mg, Subcutaneous, Q24H  fenofibrate, 48 mg, Oral, Daily  insulin lispro, 0-14 Units, Subcutaneous, 4x Daily With Meals & Nightly  pantoprazole, 40 mg, Oral, Q AM  pregabalin, 150 mg, Oral, TID  sertraline, 100 mg, Oral, Daily  sodium chloride, 10 mL, Intravenous, Q12H  thiamine, 100 mg, Oral, Daily  traZODone, 50 mg, Oral, Nightly  cholecalciferol, 5,000 Units, Oral, Daily  zinc sulfate, 220 mg, Oral, Daily        Infusions sodium chloride, 50 mL/hr, Last Rate: 50 mL/hr (08/11/22 0729)        PRN Medications •  acetaminophen **OR** acetaminophen  •  aluminum-magnesium hydroxide-simethicone  •  dextrose  •  dextrose  •  glucagon (human recombinant)  •  hydrALAZINE  •  HYDROcodone-acetaminophen  •  nitroglycerin  •  ondansetron **OR** ondansetron  •  sodium chloride     Physical Findings        Trending Physical   Appearance, NFPE 8/12: NFPE completed and not consistent with nutrition diagnosis of malnutrition at this time using AND/ASPEN criteria      --  Edema  1+ generalized, ankles, feet     Bowel Function Last BM 8/12 (today)     Tubes No feeding tube      Chewing/Swallowing No known issues      Skin Intact      --  Current Nutrition Orders & Evaluation of Intake       Oral  Nutrition     Food Allergies NKFA   Current PO Diet Diet Cardiac, Diabetic/Consistent Carbs; Healthy Heart; Diabetic - Consistent Carb   Supplement None ordered    PO Evaluation     Trending % PO Intake 8/12: 71% average PO intakes since admission    --  Nutritional Risk Screening        NRS-2002 Score          Nutrition Diagnosis         Nutrition Dx Problem 1 No nutritional diagnosis noted at this time, RD will continue to monitor for any nutritional diagnosis that may arise.      Nutrition Dx Problem 2        Intervention Goal         Intervention Goal(s) PO intakes continue greater than 70%     Nutrition Intervention        RD Action Monitor for PO intakes      Nutrition Prescription          Diet Prescription Consistent CHO, Heart Healthy    Supplement Prescription    --  Monitor/Evaluation        Monitor Per protocol, I&O, PO intake, Supplement intake, Pertinent labs, Weight, Skin status, GI status, Symptoms, POC/GOC, Hemodynamic stability       Electronically signed by:  Cassandra Guzman RD  08/12/22 08:35 EDT

## 2022-08-12 NOTE — PLAN OF CARE
Goal Outcome Evaluation:  Patient A/O x 4 on room air. VSS.  working on d/c placement. PCU overflow.

## 2022-08-13 LAB
ALBUMIN SERPL-MCNC: 3.5 G/DL (ref 3.5–5.2)
ALBUMIN/GLOB SERPL: 1.2 G/DL
ALP SERPL-CCNC: 68 U/L (ref 39–117)
ALT SERPL W P-5'-P-CCNC: 33 U/L (ref 1–41)
ANION GAP SERPL CALCULATED.3IONS-SCNC: 11 MMOL/L (ref 5–15)
AST SERPL-CCNC: 40 U/L (ref 1–40)
BASOPHILS # BLD AUTO: 0 10*3/MM3 (ref 0–0.2)
BASOPHILS NFR BLD AUTO: 0.1 % (ref 0–1.5)
BILIRUB SERPL-MCNC: 0.4 MG/DL (ref 0–1.2)
BUN SERPL-MCNC: 55 MG/DL (ref 8–23)
BUN/CREAT SERPL: 25.2 (ref 7–25)
CALCIUM SPEC-SCNC: 8.8 MG/DL (ref 8.6–10.5)
CHLORIDE SERPL-SCNC: 109 MMOL/L (ref 98–107)
CO2 SERPL-SCNC: 22 MMOL/L (ref 22–29)
CREAT SERPL-MCNC: 2.18 MG/DL (ref 0.76–1.27)
CRP SERPL-MCNC: 1.68 MG/DL (ref 0–0.5)
DEPRECATED RDW RBC AUTO: 46.4 FL (ref 37–54)
EGFRCR SERPLBLD CKD-EPI 2021: 28.9 ML/MIN/1.73
EOSINOPHIL # BLD AUTO: 0 10*3/MM3 (ref 0–0.4)
EOSINOPHIL NFR BLD AUTO: 0.1 % (ref 0.3–6.2)
ERYTHROCYTE [DISTWIDTH] IN BLOOD BY AUTOMATED COUNT: 14.4 % (ref 12.3–15.4)
FERRITIN SERPL-MCNC: 912.1 NG/ML (ref 30–400)
GLOBULIN UR ELPH-MCNC: 2.9 GM/DL
GLUCOSE BLDC GLUCOMTR-MCNC: 138 MG/DL (ref 70–105)
GLUCOSE BLDC GLUCOMTR-MCNC: 259 MG/DL (ref 70–105)
GLUCOSE BLDC GLUCOMTR-MCNC: 81 MG/DL (ref 70–105)
GLUCOSE BLDC GLUCOMTR-MCNC: 96 MG/DL (ref 70–105)
GLUCOSE SERPL-MCNC: 125 MG/DL (ref 65–99)
HCT VFR BLD AUTO: 35.5 % (ref 37.5–51)
HGB BLD-MCNC: 11.6 G/DL (ref 13–17.7)
LDH SERPL-CCNC: 194 U/L (ref 135–225)
LYMPHOCYTES # BLD AUTO: 1.4 10*3/MM3 (ref 0.7–3.1)
LYMPHOCYTES NFR BLD AUTO: 19.3 % (ref 19.6–45.3)
MAGNESIUM SERPL-MCNC: 1.8 MG/DL (ref 1.6–2.4)
MCH RBC QN AUTO: 29.8 PG (ref 26.6–33)
MCHC RBC AUTO-ENTMCNC: 32.7 G/DL (ref 31.5–35.7)
MCV RBC AUTO: 91.1 FL (ref 79–97)
MONOCYTES # BLD AUTO: 1 10*3/MM3 (ref 0.1–0.9)
MONOCYTES NFR BLD AUTO: 14.1 % (ref 5–12)
NEUTROPHILS NFR BLD AUTO: 4.9 10*3/MM3 (ref 1.7–7)
NEUTROPHILS NFR BLD AUTO: 66.4 % (ref 42.7–76)
NRBC BLD AUTO-RTO: 0 /100 WBC (ref 0–0.2)
PHOSPHATE SERPL-MCNC: 2.8 MG/DL (ref 2.5–4.5)
PLATELET # BLD AUTO: 178 10*3/MM3 (ref 140–450)
PMV BLD AUTO: 9.9 FL (ref 6–12)
POTASSIUM SERPL-SCNC: 4.5 MMOL/L (ref 3.5–5.2)
PROCALCITONIN SERPL-MCNC: 0.12 NG/ML (ref 0–0.25)
PROT SERPL-MCNC: 6.4 G/DL (ref 6–8.5)
QT INTERVAL: 463 MS
RBC # BLD AUTO: 3.89 10*6/MM3 (ref 4.14–5.8)
SODIUM SERPL-SCNC: 142 MMOL/L (ref 136–145)
WBC NRBC COR # BLD: 7.4 10*3/MM3 (ref 3.4–10.8)

## 2022-08-13 PROCEDURE — 99231 SBSQ HOSP IP/OBS SF/LOW 25: CPT | Performed by: INTERNAL MEDICINE

## 2022-08-13 PROCEDURE — 83615 LACTATE (LD) (LDH) ENZYME: CPT | Performed by: INTERNAL MEDICINE

## 2022-08-13 PROCEDURE — 86140 C-REACTIVE PROTEIN: CPT | Performed by: INTERNAL MEDICINE

## 2022-08-13 PROCEDURE — 83735 ASSAY OF MAGNESIUM: CPT | Performed by: NURSE PRACTITIONER

## 2022-08-13 PROCEDURE — 82962 GLUCOSE BLOOD TEST: CPT

## 2022-08-13 PROCEDURE — 84145 PROCALCITONIN (PCT): CPT | Performed by: INTERNAL MEDICINE

## 2022-08-13 PROCEDURE — 25010000002 ENOXAPARIN PER 10 MG: Performed by: NURSE PRACTITIONER

## 2022-08-13 PROCEDURE — 82728 ASSAY OF FERRITIN: CPT | Performed by: INTERNAL MEDICINE

## 2022-08-13 PROCEDURE — 85025 COMPLETE CBC W/AUTO DIFF WBC: CPT | Performed by: NURSE PRACTITIONER

## 2022-08-13 PROCEDURE — 63710000001 DEXAMETHASONE PER 0.25 MG: Performed by: INTERNAL MEDICINE

## 2022-08-13 PROCEDURE — 84100 ASSAY OF PHOSPHORUS: CPT | Performed by: NURSE PRACTITIONER

## 2022-08-13 PROCEDURE — 63710000001 INSULIN LISPRO (HUMAN) PER 5 UNITS: Performed by: INTERNAL MEDICINE

## 2022-08-13 PROCEDURE — 80053 COMPREHEN METABOLIC PANEL: CPT | Performed by: NURSE PRACTITIONER

## 2022-08-13 RX ADMIN — Medication 10 ML: at 21:06

## 2022-08-13 RX ADMIN — OXYCODONE HYDROCHLORIDE AND ACETAMINOPHEN 1000 MG: 500 TABLET ORAL at 09:23

## 2022-08-13 RX ADMIN — Medication 100 MG: at 09:23

## 2022-08-13 RX ADMIN — PREGABALIN 150 MG: 75 CAPSULE ORAL at 21:06

## 2022-08-13 RX ADMIN — DEXAMETHASONE 2 MG: 4 TABLET ORAL at 09:22

## 2022-08-13 RX ADMIN — INSULIN LISPRO 8 UNITS: 100 INJECTION, SOLUTION INTRAVENOUS; SUBCUTANEOUS at 17:56

## 2022-08-13 RX ADMIN — TRAZODONE HYDROCHLORIDE 50 MG: 50 TABLET ORAL at 21:06

## 2022-08-13 RX ADMIN — ENOXAPARIN SODIUM 30 MG: 100 INJECTION SUBCUTANEOUS at 15:26

## 2022-08-13 RX ADMIN — Medication 5000 UNITS: at 09:23

## 2022-08-13 RX ADMIN — ZINC SULFATE 220 MG (50 MG) CAPSULE 220 MG: CAPSULE at 09:23

## 2022-08-13 RX ADMIN — PREGABALIN 150 MG: 75 CAPSULE ORAL at 09:23

## 2022-08-13 RX ADMIN — FENOFIBRATE 48 MG: 48 TABLET ORAL at 09:23

## 2022-08-13 RX ADMIN — SERTRALINE 100 MG: 100 TABLET, FILM COATED ORAL at 09:23

## 2022-08-13 RX ADMIN — AMLODIPINE BESYLATE 5 MG: 5 TABLET ORAL at 09:23

## 2022-08-13 RX ADMIN — PANTOPRAZOLE SODIUM 40 MG: 40 TABLET, DELAYED RELEASE ORAL at 05:30

## 2022-08-13 RX ADMIN — PREGABALIN 150 MG: 75 CAPSULE ORAL at 15:26

## 2022-08-13 RX ADMIN — Medication 10 ML: at 09:23

## 2022-08-13 NOTE — PROGRESS NOTES
ICU Daily Progress Note        Acute renal failure (ARF) (HCC)    Acute hypoxemic respiratory failure due to COVID-19 (HCC)    Diarrhea of presumed infectious origin    Hematochezia    Metabolic acidosis    CAD (coronary artery disease)    History of MI (myocardial infarction)    [place] (hard of hearing)    Elevated troponin           Assessment & Plan     Hypoxic respiratory insufficiency currently on RA  COVID-19  Acute kidney injury metabolic acidosis  Elevated troponin rule out acute coronary syndrome  Patient reported to have positive influenza B at the other facility however the test at our facility was negative     Elevated troponin could be related to acute kidney injury cannot rule out acute coronary syndrome         Plan    DC Decadron   Bicarb drip off     Nephrology following  Hold lisinopril  Amlodipine 5 mg  Cardiology consulted for elevated troponin which could be related to acute kidney injury  Bronchodilators  Oxygen titration  DVT prophylaxis  GI prophylaxis  Glycemic control               COVID-19 LAB PANEL     COVID-19 test result  COVID19   Date Value Ref Range Status   08/09/2022 Detected (C) Not Detected - Ref. Range Final       COVID-19 Prognostic lab results  WBC with differential  Results from last 7 days   Lab Units 08/13/22  0421 08/12/22  0857 08/11/22  0459 08/10/22  0511 08/09/22  0422   WBC 10*3/mm3 7.40 8.40 6.40 5.10 5.50   PLATELETS 10*3/mm3 178 173 164 189 196   NEUTROPHIL % % 66.4 68.4 64.2 59.4 84.6*   LYMPHOCYTE % % 19.3* 19.6 21.3 26.0 11.4*   NEUTROS ABS 10*3/mm3 4.90 5.80 4.10 3.00 4.60   LYMPHS ABS 10*3/mm3 1.40 1.60 1.40 1.30 0.60*     Inflammatory markers  Results from last 7 days   Lab Units 08/13/22  0421 08/12/22  0857 08/11/22  0459 08/10/22  1104 08/10/22  0511 08/09/22  1532 08/09/22  1004 08/09/22  0422   CRP mg/dL 1.68* 1.19* 0.95*  --  2.21*  --   --  2.67*   FERRITIN ng/mL 912.10* 926.40* 898.10*  --  882.80*  --   --   --    CK TOTAL U/L  --   --   --   --   --    --   --  168   D DIMER QUANT mg/L (FEU)  --   --   --   --  1.64*  --   --   --    PROCALCITONIN ng/mL 0.12 0.10 0.11  --  0.16  --   --  0.28*   LDH U/L 194 215 187  --  195  --   --   --    ALK PHOS U/L 68 69 63  --  68  --   --  80   AST (SGOT) U/L 40 41* 38  --  35  --   --  44*   ALT (SGPT) U/L 33 31 30  --  30  --   --  36   TROPONIN T ng/mL  --   --   --  0.067* 0.055* 0.050* 0.059* 0.066*       Poor COVID-19 outcomes associated with:  Neutrophil:Lymphocyte ratio >3.5  Thrombocytopenia, lymphopenia  LFT's >5x upper limit of normal  LDH >400   LOS: 4 days         Vital signs for last 24 hours:  Vitals:    08/13/22 0850 08/13/22 0900 08/13/22 1000 08/13/22 1100   BP:  139/72 (!) 157/101 147/82   Pulse:  76 86 94   Resp:       Temp: 97.8 °F (36.6 °C)   99.1 °F (37.3 °C)   TempSrc: Axillary   Oral   SpO2:  96% 96% 94%   Weight:       Height:           Intake/Output last 3 shifts:  I/O last 3 completed shifts:  In: 2987 [P.O.:960; I.V.:2027]  Out: 2800 [Urine:2800]  Intake/Output this shift:  I/O this shift:  In: -   Out: 300 [Urine:300]    Vent settings for last 24 hours:       Hemodynamic parameters for last 24 hours:       Radiology  Imaging Results (Last 24 Hours)     ** No results found for the last 24 hours. **          Labs:  Results from last 7 days   Lab Units 08/13/22  0421   WBC 10*3/mm3 7.40   HEMOGLOBIN g/dL 11.6*   HEMATOCRIT % 35.5*   PLATELETS 10*3/mm3 178     Results from last 7 days   Lab Units 08/13/22  0421   SODIUM mmol/L 142   POTASSIUM mmol/L 4.5   CHLORIDE mmol/L 109*   CO2 mmol/L 22.0   BUN mg/dL 55*   CREATININE mg/dL 2.18*   CALCIUM mg/dL 8.8   BILIRUBIN mg/dL 0.4   ALK PHOS U/L 68   ALT (SGPT) U/L 33   AST (SGOT) U/L 40   GLUCOSE mg/dL 125*         Results from last 7 days   Lab Units 08/13/22  0421 08/12/22  0857 08/11/22  0459   ALBUMIN g/dL 3.50 3.40* 3.40*     Results from last 7 days   Lab Units 08/10/22  1104 08/10/22  0511 08/09/22  1532 08/09/22  1004 08/09/22  0422   CK TOTAL  U/L  --   --   --   --  168   TROPONIN T ng/mL 0.067* 0.055* 0.050*   < > 0.066*    < > = values in this interval not displayed.         Results from last 7 days   Lab Units 08/13/22  0421   MAGNESIUM mg/dL 1.8     Results from last 7 days   Lab Units 08/09/22  0422   INR  1.21*   APTT seconds 32.9*               Meds:   SCHEDULE  amLODIPine, 5 mg, Oral, Q24H  ascorbic acid, 1,000 mg, Oral, Daily  dexamethasone, 2 mg, Oral, Daily With Breakfast  enoxaparin, 30 mg, Subcutaneous, Q24H  fenofibrate, 48 mg, Oral, Daily  insulin lispro, 0-14 Units, Subcutaneous, 4x Daily With Meals & Nightly  pantoprazole, 40 mg, Oral, Q AM  pregabalin, 150 mg, Oral, TID  sertraline, 100 mg, Oral, Daily  sodium chloride, 10 mL, Intravenous, Q12H  thiamine, 100 mg, Oral, Daily  traZODone, 50 mg, Oral, Nightly  cholecalciferol, 5,000 Units, Oral, Daily  zinc sulfate, 220 mg, Oral, Daily      Infusions  sodium chloride, 50 mL/hr, Last Rate: 50 mL/hr (08/11/22 0729)      PRNs  •  acetaminophen **OR** acetaminophen  •  aluminum-magnesium hydroxide-simethicone  •  dextrose  •  dextrose  •  glucagon (human recombinant)  •  hydrALAZINE  •  HYDROcodone-acetaminophen  •  nitroglycerin  •  ondansetron **OR** ondansetron  •  sodium chloride    Physical Exam:  Physical Exam  Cardiovascular:      Heart sounds: Murmur heard.         ROS  Review of Systems   Respiratory: Negative for cough and shortness of breath.          Critical Care Time greater than: 45 Minutes  Total time spent with patient greater than: 45 Minutes

## 2022-08-13 NOTE — PROGRESS NOTES
"                                                                                                                                      Nephrology  Progress Note                                        [facility]    Patient Identification    Name: Shalom Alexis  Age: 87 y.o.  Sex: male  :  1935  MRN: [id number]      DATE OF SERVICE:  2022        Subective    Resting   Up on chair  Objective   Scheduled Meds:amLODIPine, 5 mg, Oral, Q24H  ascorbic acid, 1,000 mg, Oral, Daily  dexamethasone, 2 mg, Oral, Daily With Breakfast  enoxaparin, 30 mg, Subcutaneous, Q24H  fenofibrate, 48 mg, Oral, Daily  insulin lispro, 0-14 Units, Subcutaneous, 4x Daily With Meals & Nightly  pantoprazole, 40 mg, Oral, Q AM  pregabalin, 150 mg, Oral, TID  sertraline, 100 mg, Oral, Daily  sodium chloride, 10 mL, Intravenous, Q12H  thiamine, 100 mg, Oral, Daily  traZODone, 50 mg, Oral, Nightly  cholecalciferol, 5,000 Units, Oral, Daily  zinc sulfate, 220 mg, Oral, Daily          Continuous Infusions:sodium chloride, 50 mL/hr, Last Rate: 50 mL/hr (22)        PRN Meds:•  acetaminophen **OR** acetaminophen  •  aluminum-magnesium hydroxide-simethicone  •  dextrose  •  dextrose  •  glucagon (human recombinant)  •  hydrALAZINE  •  HYDROcodone-acetaminophen  •  nitroglycerin  •  ondansetron **OR** ondansetron  •  sodium chloride     Exam:  /82   Pulse 66   Temp 98 °F (36.7 °C) (Oral)   Resp 12   Ht 185.4 cm (73\")   Wt 99.9 kg (220 lb 3.8 oz)   SpO2 95%   BMI 29.06 kg/m²     Intake/Output last 3 shifts:  I/O last 3 completed shifts:  In: 2987 [P.O.:960; I.V.:]  Out: 2800 [Urine:2800]    Intake/Output this shift:  No intake/output data recorded.    Physical exam:  General Appearance:  no distress   ithout obvious abnormality, atraumatic  Eyes:  PERRL, conjunctiva/corneas clear     Neck:  Supple,  no adenopathy;      Lungs:  Decreased BS occasion ronchi  Heart:  Regular rate and rhythm, S1 and S2 " normal  Abdomen:  Soft, non-tender, bowel sounds active   Extremities: trace edema  Pulses: 2+ and symmetric all extremities  Skin:  No rashes or lesions       Data Review:  All labs (24hrs):   Recent Results (from the past 24 hour(s))   Magnesium    Collection Time: 08/12/22  8:57 AM    Specimen: Blood   Result Value Ref Range    Magnesium 1.7 1.6 - 2.4 mg/dL   Phosphorus    Collection Time: 08/12/22  8:57 AM    Specimen: Blood   Result Value Ref Range    Phosphorus 2.5 2.5 - 4.5 mg/dL   Comprehensive Metabolic Panel    Collection Time: 08/12/22  8:57 AM    Specimen: Blood   Result Value Ref Range    Glucose 87 65 - 99 mg/dL    BUN 53 (H) 8 - 23 mg/dL    Creatinine 2.24 (H) 0.76 - 1.27 mg/dL    Sodium 142 136 - 145 mmol/L    Potassium 4.4 3.5 - 5.2 mmol/L    Chloride 108 (H) 98 - 107 mmol/L    CO2 23.0 22.0 - 29.0 mmol/L    Calcium 8.9 8.6 - 10.5 mg/dL    Total Protein 6.4 6.0 - 8.5 g/dL    Albumin 3.40 (L) 3.50 - 5.20 g/dL    ALT (SGPT) 31 1 - 41 U/L    AST (SGOT) 41 (H) 1 - 40 U/L    Alkaline Phosphatase 69 39 - 117 U/L    Total Bilirubin 0.6 0.0 - 1.2 mg/dL    Globulin 3.0 gm/dL    A/G Ratio 1.1 g/dL    BUN/Creatinine Ratio 23.7 7.0 - 25.0    Anion Gap 11.0 5.0 - 15.0 mmol/L    eGFR 28.0 (L) >60.0 mL/min/1.73   C-reactive Protein    Collection Time: 08/12/22  8:57 AM    Specimen: Blood   Result Value Ref Range    C-Reactive Protein 1.19 (H) 0.00 - 0.50 mg/dL   Procalcitonin    Collection Time: 08/12/22  8:57 AM    Specimen: Blood   Result Value Ref Range    Procalcitonin 0.10 0.00 - 0.25 ng/mL   Ferritin    Collection Time: 08/12/22  8:57 AM    Specimen: Blood   Result Value Ref Range    Ferritin 926.40 (H) 30.00 - 400.00 ng/mL   Lactate Dehydrogenase    Collection Time: 08/12/22  8:57 AM    Specimen: Blood   Result Value Ref Range     135 - 225 U/L   CBC Auto Differential    Collection Time: 08/12/22  8:57 AM    Specimen: Blood   Result Value Ref Range    WBC 8.40 3.40 - 10.80 10*3/mm3    RBC 4.04 (L)  4.14 - 5.80 10*6/mm3    Hemoglobin 12.3 (L) 13.0 - 17.7 g/dL    Hematocrit 36.9 (L) 37.5 - 51.0 %    MCV 91.1 79.0 - 97.0 fL    MCH 30.4 26.6 - 33.0 pg    MCHC 33.3 31.5 - 35.7 g/dL    RDW 14.3 12.3 - 15.4 %    RDW-SD 45.9 37.0 - 54.0 fl    MPV 10.0 6.0 - 12.0 fL    Platelets 173 140 - 450 10*3/mm3    Neutrophil % 68.4 42.7 - 76.0 %    Lymphocyte % 19.6 19.6 - 45.3 %    Monocyte % 11.5 5.0 - 12.0 %    Eosinophil % 0.1 (L) 0.3 - 6.2 %    Basophil % 0.4 0.0 - 1.5 %    Neutrophils, Absolute 5.80 1.70 - 7.00 10*3/mm3    Lymphocytes, Absolute 1.60 0.70 - 3.10 10*3/mm3    Monocytes, Absolute 1.00 (H) 0.10 - 0.90 10*3/mm3    Eosinophils, Absolute 0.00 0.00 - 0.40 10*3/mm3    Basophils, Absolute 0.00 0.00 - 0.20 10*3/mm3    nRBC 0.1 0.0 - 0.2 /100 WBC   POC Glucose Once    Collection Time: 08/12/22 11:03 AM    Specimen: Blood   Result Value Ref Range    Glucose 143 (H) 70 - 105 mg/dL   POC Glucose Once    Collection Time: 08/12/22  4:50 PM    Specimen: Blood   Result Value Ref Range    Glucose 298 (H) 70 - 105 mg/dL   POC Glucose Once    Collection Time: 08/12/22  7:46 PM    Specimen: Blood   Result Value Ref Range    Glucose 241 (H) 70 - 105 mg/dL   Magnesium    Collection Time: 08/13/22  4:21 AM    Specimen: Blood   Result Value Ref Range    Magnesium 1.8 1.6 - 2.4 mg/dL   Phosphorus    Collection Time: 08/13/22  4:21 AM    Specimen: Blood   Result Value Ref Range    Phosphorus 2.8 2.5 - 4.5 mg/dL   Comprehensive Metabolic Panel    Collection Time: 08/13/22  4:21 AM    Specimen: Blood   Result Value Ref Range    Glucose 125 (H) 65 - 99 mg/dL    BUN 55 (H) 8 - 23 mg/dL    Creatinine 2.18 (H) 0.76 - 1.27 mg/dL    Sodium 142 136 - 145 mmol/L    Potassium 4.5 3.5 - 5.2 mmol/L    Chloride 109 (H) 98 - 107 mmol/L    CO2 22.0 22.0 - 29.0 mmol/L    Calcium 8.8 8.6 - 10.5 mg/dL    Total Protein 6.4 6.0 - 8.5 g/dL    Albumin 3.50 3.50 - 5.20 g/dL    ALT (SGPT) 33 1 - 41 U/L    AST (SGOT) 40 1 - 40 U/L    Alkaline Phosphatase 68 39  - 117 U/L    Total Bilirubin 0.4 0.0 - 1.2 mg/dL    Globulin 2.9 gm/dL    A/G Ratio 1.2 g/dL    BUN/Creatinine Ratio 25.2 (H) 7.0 - 25.0    Anion Gap 11.0 5.0 - 15.0 mmol/L    eGFR 28.9 (L) >60.0 mL/min/1.73   C-reactive Protein    Collection Time: 08/13/22  4:21 AM    Specimen: Blood   Result Value Ref Range    C-Reactive Protein 1.68 (H) 0.00 - 0.50 mg/dL   Procalcitonin    Collection Time: 08/13/22  4:21 AM    Specimen: Blood   Result Value Ref Range    Procalcitonin 0.12 0.00 - 0.25 ng/mL   Ferritin    Collection Time: 08/13/22  4:21 AM    Specimen: Blood   Result Value Ref Range    Ferritin 912.10 (H) 30.00 - 400.00 ng/mL   Lactate Dehydrogenase    Collection Time: 08/13/22  4:21 AM    Specimen: Blood   Result Value Ref Range     135 - 225 U/L   CBC Auto Differential    Collection Time: 08/13/22  4:21 AM    Specimen: Blood   Result Value Ref Range    WBC 7.40 3.40 - 10.80 10*3/mm3    RBC 3.89 (L) 4.14 - 5.80 10*6/mm3    Hemoglobin 11.6 (L) 13.0 - 17.7 g/dL    Hematocrit 35.5 (L) 37.5 - 51.0 %    MCV 91.1 79.0 - 97.0 fL    MCH 29.8 26.6 - 33.0 pg    MCHC 32.7 31.5 - 35.7 g/dL    RDW 14.4 12.3 - 15.4 %    RDW-SD 46.4 37.0 - 54.0 fl    MPV 9.9 6.0 - 12.0 fL    Platelets 178 140 - 450 10*3/mm3    Neutrophil % 66.4 42.7 - 76.0 %    Lymphocyte % 19.3 (L) 19.6 - 45.3 %    Monocyte % 14.1 (H) 5.0 - 12.0 %    Eosinophil % 0.1 (L) 0.3 - 6.2 %    Basophil % 0.1 0.0 - 1.5 %    Neutrophils, Absolute 4.90 1.70 - 7.00 10*3/mm3    Lymphocytes, Absolute 1.40 0.70 - 3.10 10*3/mm3    Monocytes, Absolute 1.00 (H) 0.10 - 0.90 10*3/mm3    Eosinophils, Absolute 0.00 0.00 - 0.40 10*3/mm3    Basophils, Absolute 0.00 0.00 - 0.20 10*3/mm3    nRBC 0.0 0.0 - 0.2 /100 WBC          Imaging:  XR Chest 1 View    Result Date: 8/9/2022  Right basilar atelectasis  Electronically Signed By-Tony De La Torre On:8/9/2022 7:48 AM This report was finalized on [id number] by  Tony De La Torre, .      Assessment/Plan:     Acute renal failure (ARF)  (HCC)    Acute hypoxemic respiratory failure due to COVID-19 (HCC)    Diarrhea of presumed infectious origin    Hematochezia    Metabolic acidosis    CAD (coronary artery disease)    History of MI (myocardial infarction)    [place] (hard of hearing)    Elevated troponin     Acute kidney injury  Metabolic acidosis  Hyperkalemia  COVID-19 pneumonia  Elevated troponin  Hyperglycemia  Hard of hearing      Creatinine trending down further down 2.2  Acidosis improving  DC bicarb drip  Currently on half-normal saline at 50 cc an hour

## 2022-08-13 NOTE — PLAN OF CARE
Problem: Adult Inpatient Plan of Care  Goal: Plan of Care Review  Outcome: Ongoing, Progressing  Flowsheets (Taken 8/13/2022 1833)  Plan of Care Reviewed With: patient  Outcome Evaluation: patient stable on room air. transferred from ICU, report from Elle. nicole ADEN. diarrhea remains, enhanced droplet precautions for COVID. Stage 2 pressure injury to gluteal, turn every two hours and keep heels off bed. will continue to monitor.  Goal: Patient-Specific Goal (Individualized)  Outcome: Ongoing, Progressing  Goal: Absence of Hospital-Acquired Illness or Injury  Outcome: Ongoing, Progressing  Intervention: Identify and Manage Fall Risk  Description: Perform standard risk assessment on admission using a validated tool or comprehensive approach appropriate to the patient; reassess fall risk frequently, with change in status or transfer to another level of care.  Communicate fall injury risk to interprofessional healthcare team.  Determine need for increased observation, equipment and environmental modification, such as low bed, signage and supportive, nonskid footwear.  Adjust safety measures to individual developmental age, stage and identified risk factors.  Reinforce the importance of safety and physical activity with patient and family.  Perform regular intentional rounding to assess need for position change, pain assessment and personal needs, including assistance with toileting.  Recent Flowsheet Documentation  Taken 8/13/2022 1709 by Arelis Gómez, RN  Safety Promotion/Fall Prevention:   activity supervised   assistive device/personal items within reach   fall prevention program maintained   clutter free environment maintained   gait belt   nonskid shoes/slippers when out of bed   room organization consistent   safety round/check completed  Intervention: Prevent Skin Injury  Description: Perform a screening for skin injury risk, such as pressure or moisture associated skin damage on admission and at regular  intervals throughout hospital stay.  Keep all areas of skin (especially folds) clean and dry.  Maintain adequate skin hydration.  Relieve and redistribute pressure and protect bony prominences; implement measures based on patient-specific risk factors.  Match turning and repositioning schedule to clinical condition.  Encourage weight shift frequently; assist with reposition if unable to complete independently.  Float heels off bed; avoid pressure on the Achilles tendon.  Keep skin free from extended contact with medical devices.  Encourage functional activity and mobility, as early as tolerated.  Use aids (e.g., slide boards, mechanical lift) during transfer.  Recent Flowsheet Documentation  Taken 8/13/2022 1709 by Arelis Gómez, RN  Body Position: weight shifting  Skin Protection: tubing/devices free from skin contact  Intervention: Prevent and Manage VTE (Venous Thromboembolism) Risk  Description: Assess for VTE (venous thromboembolism) risk.  Encourage and assist with early ambulation.  Initiate and maintain compression or other therapy, as indicated, based on identified risk in accordance with organizational protocol and provider order.  Encourage both active and passive leg exercises while in bed, if unable to ambulate.  Recent Flowsheet Documentation  Taken 8/13/2022 1709 by Arelis Gómez, RN  Activity Management: activity adjusted per tolerance  VTE Prevention/Management:   bilateral   sequential compression devices off  Goal: Optimal Comfort and Wellbeing  Outcome: Ongoing, Progressing  Intervention: Provide Person-Centered Care  Description: Use a family-focused approach to care.  Develop trust and rapport by proactively providing information, encouraging questions, addressing concerns and offering reassurance.  Acknowledge emotional response to hospitalization.  Recognize and utilize personal coping strategies.  Honor spiritual and cultural preferences.  Recent Flowsheet Documentation  Taken 8/13/2022 1709  by Arelis Gómez, RN  Trust Relationship/Rapport:   care explained   choices provided   reassurance provided   thoughts/feelings acknowledged  Goal: Readiness for Transition of Care  Outcome: Ongoing, Progressing   Goal Outcome Evaluation:  Plan of Care Reviewed With: patient           Outcome Evaluation: patient stable on room air. transferred from ICU, report from Elle. very [place]. diarrhea remains, enhanced droplet precautions for COVID. Stage 2 pressure injury to gluteal, turn every two hours and keep heels off bed. will continue to monitor.

## 2022-08-14 LAB
ALBUMIN SERPL-MCNC: 3.2 G/DL (ref 3.5–5.2)
ALBUMIN/GLOB SERPL: 1.3 G/DL
ALP SERPL-CCNC: 67 U/L (ref 39–117)
ALT SERPL W P-5'-P-CCNC: 31 U/L (ref 1–41)
ANION GAP SERPL CALCULATED.3IONS-SCNC: 11 MMOL/L (ref 5–15)
AST SERPL-CCNC: 36 U/L (ref 1–40)
BACTERIA SPEC AEROBE CULT: NORMAL
BACTERIA SPEC AEROBE CULT: NORMAL
BASOPHILS # BLD AUTO: 0 10*3/MM3 (ref 0–0.2)
BASOPHILS NFR BLD AUTO: 0.1 % (ref 0–1.5)
BILIRUB SERPL-MCNC: 0.4 MG/DL (ref 0–1.2)
BUN SERPL-MCNC: 52 MG/DL (ref 8–23)
BUN/CREAT SERPL: 23.5 (ref 7–25)
CALCIUM SPEC-SCNC: 8.9 MG/DL (ref 8.6–10.5)
CHLORIDE SERPL-SCNC: 108 MMOL/L (ref 98–107)
CO2 SERPL-SCNC: 22 MMOL/L (ref 22–29)
CREAT SERPL-MCNC: 2.21 MG/DL (ref 0.76–1.27)
CRP SERPL-MCNC: 3.12 MG/DL (ref 0–0.5)
DEPRECATED RDW RBC AUTO: 45.5 FL (ref 37–54)
EGFRCR SERPLBLD CKD-EPI 2021: 28.5 ML/MIN/1.73
EOSINOPHIL # BLD AUTO: 0 10*3/MM3 (ref 0–0.4)
EOSINOPHIL NFR BLD AUTO: 0.2 % (ref 0.3–6.2)
ERYTHROCYTE [DISTWIDTH] IN BLOOD BY AUTOMATED COUNT: 14.2 % (ref 12.3–15.4)
FERRITIN SERPL-MCNC: 927.9 NG/ML (ref 30–400)
GLOBULIN UR ELPH-MCNC: 2.5 GM/DL
GLUCOSE BLDC GLUCOMTR-MCNC: 155 MG/DL (ref 70–105)
GLUCOSE BLDC GLUCOMTR-MCNC: 180 MG/DL (ref 70–105)
GLUCOSE BLDC GLUCOMTR-MCNC: 189 MG/DL (ref 70–105)
GLUCOSE BLDC GLUCOMTR-MCNC: 93 MG/DL (ref 70–105)
GLUCOSE SERPL-MCNC: 109 MG/DL (ref 65–99)
HCT VFR BLD AUTO: 34.7 % (ref 37.5–51)
HGB BLD-MCNC: 11.6 G/DL (ref 13–17.7)
LDH SERPL-CCNC: 168 U/L (ref 135–225)
LYMPHOCYTES # BLD AUTO: 1.5 10*3/MM3 (ref 0.7–3.1)
LYMPHOCYTES NFR BLD AUTO: 20.4 % (ref 19.6–45.3)
MAGNESIUM SERPL-MCNC: 1.5 MG/DL (ref 1.6–2.4)
MCH RBC QN AUTO: 30.4 PG (ref 26.6–33)
MCHC RBC AUTO-ENTMCNC: 33.5 G/DL (ref 31.5–35.7)
MCV RBC AUTO: 90.8 FL (ref 79–97)
MONOCYTES # BLD AUTO: 0.9 10*3/MM3 (ref 0.1–0.9)
MONOCYTES NFR BLD AUTO: 13 % (ref 5–12)
NEUTROPHILS NFR BLD AUTO: 4.8 10*3/MM3 (ref 1.7–7)
NEUTROPHILS NFR BLD AUTO: 66.3 % (ref 42.7–76)
NRBC BLD AUTO-RTO: 0.1 /100 WBC (ref 0–0.2)
PHOSPHATE SERPL-MCNC: 3.2 MG/DL (ref 2.5–4.5)
PLATELET # BLD AUTO: 161 10*3/MM3 (ref 140–450)
PMV BLD AUTO: 10 FL (ref 6–12)
POTASSIUM SERPL-SCNC: 4.5 MMOL/L (ref 3.5–5.2)
PROCALCITONIN SERPL-MCNC: 0.15 NG/ML (ref 0–0.25)
PROT SERPL-MCNC: 5.7 G/DL (ref 6–8.5)
RBC # BLD AUTO: 3.82 10*6/MM3 (ref 4.14–5.8)
SODIUM SERPL-SCNC: 141 MMOL/L (ref 136–145)
WBC NRBC COR # BLD: 7.3 10*3/MM3 (ref 3.4–10.8)

## 2022-08-14 PROCEDURE — 84100 ASSAY OF PHOSPHORUS: CPT | Performed by: NURSE PRACTITIONER

## 2022-08-14 PROCEDURE — 86140 C-REACTIVE PROTEIN: CPT | Performed by: INTERNAL MEDICINE

## 2022-08-14 PROCEDURE — 97535 SELF CARE MNGMENT TRAINING: CPT

## 2022-08-14 PROCEDURE — 85025 COMPLETE CBC W/AUTO DIFF WBC: CPT | Performed by: NURSE PRACTITIONER

## 2022-08-14 PROCEDURE — 80053 COMPREHEN METABOLIC PANEL: CPT | Performed by: NURSE PRACTITIONER

## 2022-08-14 PROCEDURE — 63710000001 INSULIN LISPRO (HUMAN) PER 5 UNITS: Performed by: INTERNAL MEDICINE

## 2022-08-14 PROCEDURE — 99231 SBSQ HOSP IP/OBS SF/LOW 25: CPT | Performed by: INTERNAL MEDICINE

## 2022-08-14 PROCEDURE — 82962 GLUCOSE BLOOD TEST: CPT

## 2022-08-14 PROCEDURE — 97116 GAIT TRAINING THERAPY: CPT

## 2022-08-14 PROCEDURE — 83615 LACTATE (LD) (LDH) ENZYME: CPT | Performed by: INTERNAL MEDICINE

## 2022-08-14 PROCEDURE — 25010000002 ENOXAPARIN PER 10 MG: Performed by: NURSE PRACTITIONER

## 2022-08-14 PROCEDURE — 83735 ASSAY OF MAGNESIUM: CPT | Performed by: NURSE PRACTITIONER

## 2022-08-14 PROCEDURE — 84145 PROCALCITONIN (PCT): CPT | Performed by: INTERNAL MEDICINE

## 2022-08-14 PROCEDURE — 82728 ASSAY OF FERRITIN: CPT | Performed by: INTERNAL MEDICINE

## 2022-08-14 RX ADMIN — INSULIN LISPRO 3 UNITS: 100 INJECTION, SOLUTION INTRAVENOUS; SUBCUTANEOUS at 17:49

## 2022-08-14 RX ADMIN — Medication 10 ML: at 09:06

## 2022-08-14 RX ADMIN — PREGABALIN 150 MG: 75 CAPSULE ORAL at 17:49

## 2022-08-14 RX ADMIN — ZINC SULFATE 220 MG (50 MG) CAPSULE 220 MG: CAPSULE at 09:06

## 2022-08-14 RX ADMIN — AMLODIPINE BESYLATE 5 MG: 5 TABLET ORAL at 09:06

## 2022-08-14 RX ADMIN — SERTRALINE 100 MG: 100 TABLET, FILM COATED ORAL at 09:06

## 2022-08-14 RX ADMIN — Medication 10 ML: at 21:19

## 2022-08-14 RX ADMIN — TRAZODONE HYDROCHLORIDE 50 MG: 50 TABLET ORAL at 21:18

## 2022-08-14 RX ADMIN — ENOXAPARIN SODIUM 30 MG: 100 INJECTION SUBCUTANEOUS at 17:49

## 2022-08-14 RX ADMIN — PREGABALIN 150 MG: 75 CAPSULE ORAL at 21:18

## 2022-08-14 RX ADMIN — OXYCODONE HYDROCHLORIDE AND ACETAMINOPHEN 1000 MG: 500 TABLET ORAL at 09:09

## 2022-08-14 RX ADMIN — INSULIN LISPRO 3 UNITS: 100 INJECTION, SOLUTION INTRAVENOUS; SUBCUTANEOUS at 12:16

## 2022-08-14 RX ADMIN — Medication 100 MG: at 09:06

## 2022-08-14 RX ADMIN — PANTOPRAZOLE SODIUM 40 MG: 40 TABLET, DELAYED RELEASE ORAL at 05:11

## 2022-08-14 RX ADMIN — PREGABALIN 150 MG: 75 CAPSULE ORAL at 09:06

## 2022-08-14 RX ADMIN — FENOFIBRATE 48 MG: 48 TABLET ORAL at 09:06

## 2022-08-14 RX ADMIN — INSULIN LISPRO 3 UNITS: 100 INJECTION, SOLUTION INTRAVENOUS; SUBCUTANEOUS at 21:18

## 2022-08-14 RX ADMIN — Medication 5000 UNITS: at 09:06

## 2022-08-14 NOTE — PROGRESS NOTES
"                                                                                                                                      Nephrology  Progress Note                                        [facility]    Patient Identification    Name: Shalom Alexis  Age: 87 y.o.  Sex: male  :  1935  MRN: [id number]      DATE OF SERVICE:  2022        Subective    Resting   Up on chair  Objective   Scheduled Meds:amLODIPine, 5 mg, Oral, Q24H  ascorbic acid, 1,000 mg, Oral, Daily  enoxaparin, 30 mg, Subcutaneous, Q24H  fenofibrate, 48 mg, Oral, Daily  insulin lispro, 0-14 Units, Subcutaneous, 4x Daily With Meals & Nightly  pantoprazole, 40 mg, Oral, Q AM  pregabalin, 150 mg, Oral, TID  sertraline, 100 mg, Oral, Daily  sodium chloride, 10 mL, Intravenous, Q12H  thiamine, 100 mg, Oral, Daily  traZODone, 50 mg, Oral, Nightly  cholecalciferol, 5,000 Units, Oral, Daily  zinc sulfate, 220 mg, Oral, Daily          Continuous Infusions:sodium chloride, 50 mL/hr, Last Rate: 50 mL/hr (22)        PRN Meds:•  acetaminophen **OR** acetaminophen  •  aluminum-magnesium hydroxide-simethicone  •  dextrose  •  dextrose  •  glucagon (human recombinant)  •  hydrALAZINE  •  HYDROcodone-acetaminophen  •  nitroglycerin  •  ondansetron **OR** ondansetron  •  sodium chloride     Exam:  /84 (BP Location: Right arm, Patient Position: Lying)   Pulse 70   Temp 97.5 °F (36.4 °C) (Oral)   Resp 17   Ht 185.4 cm (73\")   Wt 94.4 kg (208 lb 1.8 oz)   SpO2 92%   BMI 27.46 kg/m²     Intake/Output last 3 shifts:  I/O last 3 completed shifts:  In:  [P.O.:240; I.V.:1694]  Out: 1350 [Urine:1350]    Intake/Output this shift:  No intake/output data recorded.    Physical exam:  General Appearance:  no distress   ithout obvious abnormality, atraumatic  Eyes:  PERRL, conjunctiva/corneas clear     Neck:  Supple,  no adenopathy;      Lungs:  Decreased BS occasion ronjaret  Heart:  Regular rate and rhythm, S1 and S2 " normal  Abdomen:  Soft, non-tender, bowel sounds active   Extremities: trace edema  Pulses: 2+ and symmetric all extremities  Skin:  No rashes or lesions       Data Review:  All labs (24hrs):   Recent Results (from the past 24 hour(s))   POC Glucose Once    Collection Time: 08/13/22  8:41 AM    Specimen: Blood   Result Value Ref Range    Glucose 81 70 - 105 mg/dL   POC Glucose Once    Collection Time: 08/13/22 11:30 AM    Specimen: Blood   Result Value Ref Range    Glucose 96 70 - 105 mg/dL   POC Glucose Once    Collection Time: 08/13/22  5:26 PM    Specimen: Blood   Result Value Ref Range    Glucose 259 (H) 70 - 105 mg/dL   POC Glucose Once    Collection Time: 08/13/22  8:44 PM    Specimen: Blood   Result Value Ref Range    Glucose 138 (H) 70 - 105 mg/dL   Magnesium    Collection Time: 08/14/22  2:37 AM    Specimen: Blood   Result Value Ref Range    Magnesium 1.5 (L) 1.6 - 2.4 mg/dL   Phosphorus    Collection Time: 08/14/22  2:37 AM    Specimen: Blood   Result Value Ref Range    Phosphorus 3.2 2.5 - 4.5 mg/dL   Comprehensive Metabolic Panel    Collection Time: 08/14/22  2:37 AM    Specimen: Blood   Result Value Ref Range    Glucose 109 (H) 65 - 99 mg/dL    BUN 52 (H) 8 - 23 mg/dL    Creatinine 2.21 (H) 0.76 - 1.27 mg/dL    Sodium 141 136 - 145 mmol/L    Potassium 4.5 3.5 - 5.2 mmol/L    Chloride 108 (H) 98 - 107 mmol/L    CO2 22.0 22.0 - 29.0 mmol/L    Calcium 8.9 8.6 - 10.5 mg/dL    Total Protein 5.7 (L) 6.0 - 8.5 g/dL    Albumin 3.20 (L) 3.50 - 5.20 g/dL    ALT (SGPT) 31 1 - 41 U/L    AST (SGOT) 36 1 - 40 U/L    Alkaline Phosphatase 67 39 - 117 U/L    Total Bilirubin 0.4 0.0 - 1.2 mg/dL    Globulin 2.5 gm/dL    A/G Ratio 1.3 g/dL    BUN/Creatinine Ratio 23.5 7.0 - 25.0    Anion Gap 11.0 5.0 - 15.0 mmol/L    eGFR 28.5 (L) >60.0 mL/min/1.73   C-reactive Protein    Collection Time: 08/14/22  2:37 AM    Specimen: Blood   Result Value Ref Range    C-Reactive Protein 3.12 (H) 0.00 - 0.50 mg/dL   Procalcitonin     Collection Time: 08/14/22  2:37 AM    Specimen: Blood   Result Value Ref Range    Procalcitonin 0.15 0.00 - 0.25 ng/mL   Ferritin    Collection Time: 08/14/22  2:37 AM    Specimen: Blood   Result Value Ref Range    Ferritin 927.90 (H) 30.00 - 400.00 ng/mL   Lactate Dehydrogenase    Collection Time: 08/14/22  2:37 AM    Specimen: Blood   Result Value Ref Range     135 - 225 U/L   CBC Auto Differential    Collection Time: 08/14/22  2:37 AM    Specimen: Blood   Result Value Ref Range    WBC 7.30 3.40 - 10.80 10*3/mm3    RBC 3.82 (L) 4.14 - 5.80 10*6/mm3    Hemoglobin 11.6 (L) 13.0 - 17.7 g/dL    Hematocrit 34.7 (L) 37.5 - 51.0 %    MCV 90.8 79.0 - 97.0 fL    MCH 30.4 26.6 - 33.0 pg    MCHC 33.5 31.5 - 35.7 g/dL    RDW 14.2 12.3 - 15.4 %    RDW-SD 45.5 37.0 - 54.0 fl    MPV 10.0 6.0 - 12.0 fL    Platelets 161 140 - 450 10*3/mm3    Neutrophil % 66.3 42.7 - 76.0 %    Lymphocyte % 20.4 19.6 - 45.3 %    Monocyte % 13.0 (H) 5.0 - 12.0 %    Eosinophil % 0.2 (L) 0.3 - 6.2 %    Basophil % 0.1 0.0 - 1.5 %    Neutrophils, Absolute 4.80 1.70 - 7.00 10*3/mm3    Lymphocytes, Absolute 1.50 0.70 - 3.10 10*3/mm3    Monocytes, Absolute 0.90 0.10 - 0.90 10*3/mm3    Eosinophils, Absolute 0.00 0.00 - 0.40 10*3/mm3    Basophils, Absolute 0.00 0.00 - 0.20 10*3/mm3    nRBC 0.1 0.0 - 0.2 /100 WBC   POC Glucose Once    Collection Time: 08/14/22  7:25 AM    Specimen: Blood   Result Value Ref Range    Glucose 93 70 - 105 mg/dL          Imaging:  XR Chest 1 View    Result Date: 8/9/2022  Right basilar atelectasis  Electronically Signed By-Tony De La Torre On:8/9/2022 7:48 AM This report was finalized on [id number] by  Tony De La Torre, .      Assessment/Plan:     Acute renal failure (ARF) (HCC)    Acute hypoxemic respiratory failure due to COVID-19 (HCC)    Diarrhea of presumed infectious origin    Hematochezia    Metabolic acidosis    CAD (coronary artery disease)    History of MI (myocardial infarction)    [place] (hard of hearing)     Elevated troponin     Acute kidney injury  Metabolic acidosis  Hyperkalemia  COVID-19 pneumonia  Elevated troponin  Hyperglycemia  Hard of hearing      Creatinine trending down further down 2.2  Acidosis improving  DC bicarb drip  Currently on half-normal saline at 50 cc an hour

## 2022-08-14 NOTE — PROGRESS NOTES
"PULMONARY CRITICAL CARE Progress  NOTE      PATIENT IDENTIFICATION:  Name: Shalom Alexis  MRN: [id number]  :  1935     Age: 87 y.o.  Sex: male    DATE OF Note:  2022   Referring Physician: Tucker Gonzalez MD                  Subjective:   Feeling better,  no SOB, still some coughing, no chest or abdominal pain, no bowel or bladder issues reported  Objective:  tMax 24 hrs: Temp (24hrs), Av.8 °F (36.6 °C), Min:96.8 °F (36 °C), Max:98.5 °F (36.9 °C)      Vitals Ranges:   Temp:  [96.8 °F (36 °C)-98.5 °F (36.9 °C)] 98.5 °F (36.9 °C)  Heart Rate:  [] 84  Resp:  [16-20] 18  BP: (118-170)/(61-95) 118/61    Intake and Output Last 3 Shifts:   I/O last 3 completed shifts:  In: 193 [P.O.:240; I.V.:1694]  Out: 1350 [Urine:1350]    Exam:  /61 (BP Location: Right arm, Patient Position: Lying)   Pulse 84   Temp 98.5 °F (36.9 °C) (Oral)   Resp 18   Ht 185.4 cm (73\")   Wt 94.4 kg (208 lb 1.8 oz)   SpO2 97%   BMI 27.46 kg/m²     General Appearance:   Alert  HEENT:  Normocephalic, without obvious abnormality, Conjunctivae/corneas clear.  Normal external ear canals, nares normal, no drainage     Neck:  Supple, symmetrical, trachea midline. No JVD.  Lungs /Chest wall:   Bilateral basal rhonchi, respirations unlabored, symmetrical wall movement.     Heart:  Regular rate and rhythm, systolic murmur PMI left sternal border  Abdomen: Soft, nontender, no masses, no organomegaly.    Extremities: Trace edema, no clubbing or cyanosis        Medications:    Current Facility-Administered Medications:     acetaminophen (TYLENOL) tablet 650 mg, 650 mg, Oral, Q4H PRN, 650 mg at 22 1607 **OR** acetaminophen (TYLENOL) suppository 650 mg, 650 mg, Rectal, Q4H PRN, Josef Chong APRN    aluminum-magnesium hydroxide-simethicone (MAALOX MAX) 400-400-40 MG/5ML suspension 15 mL, 15 mL, Oral, Q6H KIAN, Josef Chong APRN    amLODIPine (NORVASC) tablet 5 mg, 5 mg, Oral, Q24H, DrawTucker MD, 5 mg at 22 " 0906    ascorbic acid (VITAMIN C) tablet 1,000 mg, 1,000 mg, Oral, Daily, Valerie Feliz MD, 1,000 mg at 08/14/22 0909    dextrose (D50W) (25 g/50 mL) IV injection 25 g, 25 g, Intravenous, Q15 Min PRN, Josef Chong, JORDYN    dextrose (GLUTOSE) oral gel 15 g, 15 g, Oral, Q15 Min PRN, Josef Chong, APRSHASHI    Enoxaparin Sodium (LOVENOX) syringe 30 mg, 30 mg, Subcutaneous, Q24H, Day, Isabela, APRN, 30 mg at 08/13/22 1526    fenofibrate (TRICOR) tablet 48 mg, 48 mg, Oral, Daily, Day, Isabela, APRN, 48 mg at 08/14/22 0906    glucagon (human recombinant) (GLUCAGEN DIAGNOSTIC) 1 mg in sterile water (preservative free) 1 mL injection, 1 mg, Intramuscular, Q15 Min PRN, Josef Chong APRN    hydrALAZINE (APRESOLINE) injection 10 mg, 10 mg, Intravenous, Q4H PRN, Josef Chong APRN, 10 mg at 08/11/22 0542    HYDROcodone-acetaminophen (NORCO) 5-325 MG per tablet 1 tablet, 1 tablet, Oral, Q8H PRN, Day, Isabela, APRN    insulin lispro (ADMELOG) injection 0-14 Units, 0-14 Units, Subcutaneous, 4x Daily With Meals & Nightly, Valerie Feliz MD, 3 Units at 08/14/22 1216    nitroglycerin (NITROSTAT) SL tablet 0.4 mg, 0.4 mg, Sublingual, Q5 Min PRN, Josef Chong, APRN    ondansetron (ZOFRAN) tablet 4 mg, 4 mg, Oral, Q6H PRN **OR** ondansetron (ZOFRAN) injection 4 mg, 4 mg, Intravenous, Q6H PRN, Josef Chong, APRN    pantoprazole (PROTONIX) EC tablet 40 mg, 40 mg, Oral, Q AM, Valerie Feliz MD, 40 mg at 08/14/22 0511    pregabalin (LYRICA) capsule 150 mg, 150 mg, Oral, TID, Day, Isabela, APRN, 150 mg at 08/14/22 0906    sertraline (ZOLOFT) tablet 100 mg, 100 mg, Oral, Daily, Day, Isabela, APRN, 100 mg at 08/14/22 0906    sodium chloride 0.9 % flush 10 mL, 10 mL, Intravenous, Q12H, Josef Chong APRN, 10 mL at 08/14/22 0906    sodium chloride 0.9 % flush 10 mL, 10 mL, Intravenous, PRN, Josef Chong APRN    sodium chloride 0.9 % infusion, 50 mL/hr, Intravenous, Continuous, Mike Moraes MD, Last Rate: 50 mL/hr at 08/11/22  0729, 50 mL/hr at 08/11/22 0729    thiamine (VITAMIN B-1) tablet 100 mg, 100 mg, Oral, Daily, Valerie Feliz MD, 100 mg at 08/14/22 0906    traZODone (DESYREL) tablet 50 mg, 50 mg, Oral, Nightly, Day, Isabela, APRN, 50 mg at 08/13/22 2106    vitamin D3 capsule 5,000 Units, 5,000 Units, Oral, Daily, Valerie Feliz MD, 5,000 Units at 08/14/22 0906    zinc sulfate (ZINCATE) capsule 220 mg, 220 mg, Oral, Daily, Valerie Feliz MD, 220 mg at 08/14/22 0906    Data Review:  All labs (24hrs):   Recent Results (from the past 24 hour(s))   POC Glucose Once    Collection Time: 08/13/22  5:26 PM    Specimen: Blood   Result Value Ref Range    Glucose 259 (H) 70 - 105 mg/dL   POC Glucose Once    Collection Time: 08/13/22  8:44 PM    Specimen: Blood   Result Value Ref Range    Glucose 138 (H) 70 - 105 mg/dL   Magnesium    Collection Time: 08/14/22  2:37 AM    Specimen: Blood   Result Value Ref Range    Magnesium 1.5 (L) 1.6 - 2.4 mg/dL   Phosphorus    Collection Time: 08/14/22  2:37 AM    Specimen: Blood   Result Value Ref Range    Phosphorus 3.2 2.5 - 4.5 mg/dL   Comprehensive Metabolic Panel    Collection Time: 08/14/22  2:37 AM    Specimen: Blood   Result Value Ref Range    Glucose 109 (H) 65 - 99 mg/dL    BUN 52 (H) 8 - 23 mg/dL    Creatinine 2.21 (H) 0.76 - 1.27 mg/dL    Sodium 141 136 - 145 mmol/L    Potassium 4.5 3.5 - 5.2 mmol/L    Chloride 108 (H) 98 - 107 mmol/L    CO2 22.0 22.0 - 29.0 mmol/L    Calcium 8.9 8.6 - 10.5 mg/dL    Total Protein 5.7 (L) 6.0 - 8.5 g/dL    Albumin 3.20 (L) 3.50 - 5.20 g/dL    ALT (SGPT) 31 1 - 41 U/L    AST (SGOT) 36 1 - 40 U/L    Alkaline Phosphatase 67 39 - 117 U/L    Total Bilirubin 0.4 0.0 - 1.2 mg/dL    Globulin 2.5 gm/dL    A/G Ratio 1.3 g/dL    BUN/Creatinine Ratio 23.5 7.0 - 25.0    Anion Gap 11.0 5.0 - 15.0 mmol/L    eGFR 28.5 (L) >60.0 mL/min/1.73   C-reactive Protein    Collection Time: 08/14/22  2:37 AM    Specimen: Blood   Result Value Ref Range    C-Reactive Protein  3.12 (H) 0.00 - 0.50 mg/dL   Procalcitonin    Collection Time: 08/14/22  2:37 AM    Specimen: Blood   Result Value Ref Range    Procalcitonin 0.15 0.00 - 0.25 ng/mL   Ferritin    Collection Time: 08/14/22  2:37 AM    Specimen: Blood   Result Value Ref Range    Ferritin 927.90 (H) 30.00 - 400.00 ng/mL   Lactate Dehydrogenase    Collection Time: 08/14/22  2:37 AM    Specimen: Blood   Result Value Ref Range     135 - 225 U/L   CBC Auto Differential    Collection Time: 08/14/22  2:37 AM    Specimen: Blood   Result Value Ref Range    WBC 7.30 3.40 - 10.80 10*3/mm3    RBC 3.82 (L) 4.14 - 5.80 10*6/mm3    Hemoglobin 11.6 (L) 13.0 - 17.7 g/dL    Hematocrit 34.7 (L) 37.5 - 51.0 %    MCV 90.8 79.0 - 97.0 fL    MCH 30.4 26.6 - 33.0 pg    MCHC 33.5 31.5 - 35.7 g/dL    RDW 14.2 12.3 - 15.4 %    RDW-SD 45.5 37.0 - 54.0 fl    MPV 10.0 6.0 - 12.0 fL    Platelets 161 140 - 450 10*3/mm3    Neutrophil % 66.3 42.7 - 76.0 %    Lymphocyte % 20.4 19.6 - 45.3 %    Monocyte % 13.0 (H) 5.0 - 12.0 %    Eosinophil % 0.2 (L) 0.3 - 6.2 %    Basophil % 0.1 0.0 - 1.5 %    Neutrophils, Absolute 4.80 1.70 - 7.00 10*3/mm3    Lymphocytes, Absolute 1.50 0.70 - 3.10 10*3/mm3    Monocytes, Absolute 0.90 0.10 - 0.90 10*3/mm3    Eosinophils, Absolute 0.00 0.00 - 0.40 10*3/mm3    Basophils, Absolute 0.00 0.00 - 0.20 10*3/mm3    nRBC 0.1 0.0 - 0.2 /100 WBC   POC Glucose Once    Collection Time: 08/14/22  7:25 AM    Specimen: Blood   Result Value Ref Range    Glucose 93 70 - 105 mg/dL   POC Glucose Once    Collection Time: 08/14/22 12:03 PM    Specimen: Blood   Result Value Ref Range    Glucose 155 (H) 70 - 105 mg/dL        Imaging:  XR Chest 1 View  Narrative: DATE OF EXAM:  8/9/2022 5:20 AM     PROCEDURE:  XR CHEST 1 VW-     INDICATIONS:  SHORTNESS OF BREATH     COMPARISON:  No Comparisons Available     TECHNIQUE:   Single radiographic AP view of the chest was obtained.     FINDINGS:  Heart size borderline. Pulmonary vasculature appears within  normal  limits. Strandy airspace disease in the right lung base. Left lung  grossly clear      Impression: Right basilar atelectasis     Electronically Signed By-Tony De La Torre On:8/9/2022 7:48 AM  This report was finalized on [id number] by  Tony De La Torre, .       ASSESSMENT:  Acute hypoxic respiratory failure  COVID-19  Diarrhea  ARF  Hematochezia  Metabolic acidosis  CAD with Hx MI  [place]  Elevated troponin       PLAN:  PT/OT  Out of bed daily  Continue blood pressure management   electrolytes/ glycemic control  DVT and GI prophylaxis-Lovenox    Discussed with Dr. Kinga Magallanes, APRN   8/14/2022  12:37 EDT     I personally have examined  and interviewed the patient. I have reviewed the history, data, problems, assessment and plan with our NP.  Total Critical care time in direct medical management (   ) minutes, This time specifically excludes time spent performing procedures.    Ana Maria Donato MD   8/14/2022  22:32 EDT

## 2022-08-14 NOTE — PROGRESS NOTES
[facility] Medicine Services Daily Progress Note    Patient Name: Shalom Alexis  : 1935  MRN: [id number]  Primary Care Physician:  Ron Hensley MD  Date of admission: 2022      Subjective      Chief Complaint: Abdominal pain, diarrhea, feeling bad    2022  Patient Reports no new complaints or acute events.    Review of Systems   All other systems reviewed and are negative.        Objective      Vitals:   Temp:  [96.8 °F (36 °C)-98.5 °F (36.9 °C)] 98.2 °F (36.8 °C)  Heart Rate:  [] 75  Resp:  [16-19] 17  BP: (118-170)/(61-95) 126/69  Flow (L/min):  [2] 2      Vital signs reviewed.  Nursing notes reviewed.  General: well-developed and well-nourished, NAD  HEENT: NC/AT, EOMI, PERRLA, very [place]  Heart: RRR. No murmur   Chest: CTAB, no w/r/r, normal respiratory effort  Abdominal: Soft. NT/ND. Bowel sounds present  Musculoskeletal: Normal ROM.  No edema. No calf tenderness.  Neurological: AAOx3, no focal deficits  Skin: Skin is warm and dry. No rash  Psychiatric: Normal mood and affect     Result Review    Result Review:  I have personally reviewed the results from the time of this admission to 2022 19:25 EDT and agree with these findings:  [x]  Laboratory  [x]  Microbiology  [x]  Radiology  [x]  EKG/Telemetry   []  Cardiology/Vascular   []  Pathology  []  Old records  []  Other:   Most notable findings include    Wounds (last 24 hours)     LDA Wound     Row Name 22 2020             Wound 22 Left gluteal Pressure Injury    Wound - Properties Group Placement Date: 22  -LS Placement Time:   -LS Present on Hospital Admission: --  -LS, unknown  Side: Left  -LS Location: gluteal  -LS Primary Wound Type: Pressure inj  -LS Additional Comments: stage 2 pressure injury  -LS      Dressing Appearance open to air  -CM      Retired Wound - Properties Group Placement Date: 22  -LS Placement Time:   -LS Present on Hospital Admission: --  -LS,  unknown  Side: Left  -LS Location: gluteal  -LS Primary Wound Type: Pressure inj  -LS Additional Comments: stage 2 pressure injury  -LS      Retired Wound - Properties Group Date first assessed: 08/13/22  -LS Time first assessed: 1709  -LS Present on Hospital Admission: --  -LS, unknown  Side: Left  -LS Location: gluteal  -LS Primary Wound Type: Pressure inj  -LS Additional Comments: stage 2 pressure injury  -LS            User Key  (r) = Recorded By, (t) = Taken By, (c) = Cosigned By    Initials Name Provider Type    Arelis Singletary RN Registered Nurse    Eusebio Del Rio LPN Licensed Nurse                 COVID-19 LAB PANEL     COVID-19 test result  COVID19   Date Value Ref Range Status   08/09/2022 Detected (C) Not Detected - Ref. Range Final       COVID-19 Prognostic lab results  WBC with differential  Results from last 7 days   Lab Units 08/14/22  0237 08/13/22  0421 08/12/22  0857 08/11/22  0459 08/10/22  0511 08/09/22  0422   WBC 10*3/mm3 7.30 7.40 8.40 6.40 5.10 5.50   PLATELETS 10*3/mm3 161 178 173 164 189 196   NEUTROPHIL % % 66.3 66.4 68.4 64.2 59.4 84.6*   LYMPHOCYTE % % 20.4 19.3* 19.6 21.3 26.0 11.4*   NEUTROS ABS 10*3/mm3 4.80 4.90 5.80 4.10 3.00 4.60   LYMPHS ABS 10*3/mm3 1.50 1.40 1.60 1.40 1.30 0.60*     Inflammatory markers  Results from last 7 days   Lab Units 08/14/22  0237 08/13/22  0421 08/12/22  0857 08/11/22  0459 08/10/22  1104 08/10/22  0511 08/09/22  1532 08/09/22  1004 08/09/22  0422   CRP mg/dL 3.12* 1.68* 1.19* 0.95*  --  2.21*  --   --  2.67*   FERRITIN ng/mL 927.90* 912.10* 926.40* 898.10*  --  882.80*  --   --   --    CK TOTAL U/L  --   --   --   --   --   --   --   --  168   D DIMER QUANT mg/L (FEU)  --   --   --   --   --  1.64*  --   --   --    PROCALCITONIN ng/mL 0.15 0.12 0.10 0.11  --  0.16  --   --  0.28*   LDH U/L 168 194 215 187  --  195  --   --   --    ALK PHOS U/L 67 68 69 63  --  68  --   --  80   AST (SGOT) U/L 36 40 41* 38  --  35  --   --  44*   ALT (SGPT) U/L 31  "33 31 30  --  30  --   --  36   TROPONIN T ng/mL  --   --   --   --  0.067* 0.055* 0.050* 0.059* 0.066*       Poor COVID-19 outcomes associated with:  Neutrophil:Lymphocyte ratio >3.5  Thrombocytopenia, lymphopenia  LFT's >5x upper limit of normal  LDH >400      Assessment & Plan      Brief Patient Summary:  Shalom Alexis is a 87 y.o. male who tested positive for COVID-19 and flu B at [facility].  He presented with complaints of abdominal pain, diarrhea, decreased oral intake, and \"feeling bad all over.\"  Apparently the diarrhea has been going on for over 2 weeks.  He was found to have acute renal failure with BUN of 101 and creatinine 5.3 potassium 5.1.  He was also reportedly hypoxic requiring 2 L O2.        amLODIPine, 5 mg, Oral, Q24H  ascorbic acid, 1,000 mg, Oral, Daily  enoxaparin, 30 mg, Subcutaneous, Q24H  fenofibrate, 48 mg, Oral, Daily  insulin lispro, 0-14 Units, Subcutaneous, 4x Daily With Meals & Nightly  pantoprazole, 40 mg, Oral, Q AM  pregabalin, 150 mg, Oral, TID  sertraline, 100 mg, Oral, Daily  sodium chloride, 10 mL, Intravenous, Q12H  thiamine, 100 mg, Oral, Daily  traZODone, 50 mg, Oral, Nightly  cholecalciferol, 5,000 Units, Oral, Daily  zinc sulfate, 220 mg, Oral, Daily       sodium chloride, 50 mL/hr, Last Rate: 50 mL/hr (08/11/22 0729)         Active Hospital Problems:  Active Hospital Problems    Diagnosis    • **Acute renal failure (ARF) (HCC)    • Acute hypoxemic respiratory failure due to COVID-19 (HCC)    • Diarrhea of presumed infectious origin    • Elevated troponin    • CAD (coronary artery disease)    • History of MI (myocardial infarction)    • [place] (hard of hearing)    • Hematochezia    • Metabolic acidosis      Plan:   Acute hypoxemic respirato  Acute hypoxemic respiratory failure due to Covid-19  -- Too late for Remdesivir  -- Dexamethasone x10 days  -- Supplemental O2 as needed  -- Monitor lab trends  -- Pulmonology " following     Acute renal failure  Metabolic acidosis  --Hold ACE inhibitor, Lasix  -- Nephrology following     Diarrhea - improving  --GI panel negative  --C. difficile negative  --Maintain hydration and electrolyte     Elevated troponin  -- ?  Demand ischemia vs kidney disease     DM Type II with hyperglycemia  -- glimepiride and januvia held on admission  -- [employer]ks AC/HS  -- SSI coverage as needed  -- check HgbA1c     GERD  --continue pantoprazole     Depression  --continue sertraline      Hard of hearing     DVT prophylaxis:  Medical and mechanical DVT prophylaxis orders are present.    CODE STATUS:    Code Status (Patient has no pulse and is not breathing): CPR (Attempt to Resuscitate)  Medical Interventions (Patient has pulse or is breathing): Full Support      Disposition:  I expect patient to be discharged pending clinical course and consultant recommendations.    This patient has been examined wearing appropriate Personal Protective Equipment. 08/14/22      Electronically signed by Valerie Feliz MD, 08/14/22, 19:25 EDT.  [facility] Hospitalist Team

## 2022-08-14 NOTE — PLAN OF CARE
Goal Outcome Evaluation:              Outcome Evaluation: Pt continues with covid.  No falls noted. Pt encouraged to turn and move around.  Will continue to monitor.

## 2022-08-14 NOTE — THERAPY TREATMENT NOTE
"Subjective: Pt agreeable to therapeutic plan of care. Pt didn't think he would need O2 ot amb and needed to walk to bathroom. Stated he uses his wc mostly at home and can do all he needs to do.Refusing rehab.    Objective:     Bed mobility - SBA  Transfers - SBA and with rolling walker  Ambulation - 50 and 40 feet CGA and with rolling walker    Vitals: Desaturates Amb without 1L O2 and sats dropped from 90's >87%.     Pain: 0 VAS  Education: Provided education on importance of mobility and skilled verbal / tactile cueing throughout intervention.     Assessment: Shalom Alexis presents with functional mobility impairments which indicate the need for skilled intervention. Tolerating session today without incident. Pt would benefit from 24 hr care at first to make sure he's at PLOF. May not need O2 since he doesn't walk much at home. Plans on home with assist and HH. Will continue to follow and progress as tolerated.     Plan/Recommendations:   Moderate Intensity Therapy recommended post-acute care. This is recommended as therapy feels the patient would require 3-4 days per week and wouldn't tolerate \"3 hour daily\" rehab intensity. SNF would be the preferred choice. If the patient does not agree to SNF, arrange HH or OP depending on home bound status. If patient is medically complex, consider LTACH.. Pt requires no DME at discharge.     Pt desires Home with family assist and and Home Health at discharge. Pt cooperative; agreeable to therapeutic recommendations and plan of care.         Basic Mobility 6-click:  Rollin = Total, A lot = 2, A little = 3; 4 = None  Supine>Sit:   1 = Total, A lot = 2, A little = 3; 4 = None   Sit>Stand with arms:  1 = Total, A lot = 2, A little = 3; 4 = None  Bed>Chair:   1 = Total, A lot = 2, A little = 3; 4 = None  Ambulate in room:  1 = Total, A lot = 2, A little = 3; 4 = None  3-5 Steps with railin = Total, A lot = 2, A little = 3; 4 = None  Score: 19      Post-Tx " Position: Supine with HOB Elevated, Alarms activated and Call light and personal items within reach  PPE: gloves, surgical mask, eyewear protection

## 2022-08-14 NOTE — PROGRESS NOTES
[facility] Medicine Services Daily Progress Note    Patient Name: Shalom Alexis  : 1935  MRN: [id number]  Primary Care Physician:  Ron Hensley MD  Date of admission: 2022      Subjective      Chief Complaint: Abdominal pain, diarrhea, feeling bad    2022  Patient Reports no new complaints, no acute events.    Review of Systems   All other systems reviewed and are negative.        Objective      Vitals:   Temp:  [97.6 °F (36.4 °C)-99.1 °F (37.3 °C)] 97.6 °F (36.4 °C)  Heart Rate:  [] 80  Resp:  [19-20] 19  BP: (125-170)/() 170/95    Physical Exam   Vital signs reviewed.  Nursing notes reviewed.  General: well-developed and well-nourished, NAD  HEENT: NC/AT, EOMI, PERRLA, very [place]  Heart: RRR. No murmur   Chest: CTAB, no w/r/r, normal respiratory effort  Abdominal: Soft. NT/ND. Bowel sounds present  Musculoskeletal: Normal ROM.  No edema. No calf tenderness.  Neurological: AAOx3, no focal deficits  Skin: Skin is warm and dry. No rash  Psychiatric: Normal mood and affect.       Result Review    Result Review:  I have personally reviewed the results from the time of this admission to 2022 21:11 EDT and agree with these findings:  [x]  Laboratory  [x]  Microbiology  [x]  Radiology  [x]  EKG/Telemetry   []  Cardiology/Vascular   []  Pathology  []  Old records  []  Other:  Most notable findings include:    Wounds (last 24 hours)     LDA Wound     Row Name 228 22 1709       Wound 22 1709 Left gluteal Pressure Injury    Wound - Properties Group Placement Date: 22  -LS Placement Time:   -LS Present on Hospital Admission: --  -LS, unknown  Side: Left  -LS Location: gluteal  -LS Primary Wound Type: Pressure inj  -LS Additional Comments: stage 2 pressure injury  -LS    Wound Image View All Images -- View Images  -LS --    Pressure Injury Stage -- -- 2  -LS    Dressing Appearance open to air  -CM -- open to air  -LS     Drainage Amount -- -- none  -LS    Dressing Care -- -- open to air  -LS    Retired Wound - Properties Group Placement Date: 08/13/22  -LS Placement Time: 1709 -LS Present on Hospital Admission: --  -LS, unknown  Side: Left  -LS Location: gluteal  -LS Primary Wound Type: Pressure inj  -LS Additional Comments: stage 2 pressure injury  -LS    Retired Wound - Properties Group Date first assessed: 08/13/22  -LS Time first assessed: 1709  -LS Present on Hospital Admission: --  -LS, unknown  Side: Left  -LS Location: gluteal  -LS Primary Wound Type: Pressure inj  -LS Additional Comments: stage 2 pressure injury  -LS          User Key  (r) = Recorded By, (t) = Taken By, (c) = Cosigned By    Initials Name Provider Type    Arelis iSngletary, RN Registered Nurse    Eusebio Del Rio LPN Licensed Nurse                 COVID-19 LAB PANEL     COVID-19 test result  COVID19   Date Value Ref Range Status   08/09/2022 Detected (C) Not Detected - Ref. Range Final       COVID-19 Prognostic lab results  WBC with differential  Results from last 7 days   Lab Units 08/13/22  0421 08/12/22  0857 08/11/22  0459 08/10/22  0511 08/09/22  0422   WBC 10*3/mm3 7.40 8.40 6.40 5.10 5.50   PLATELETS 10*3/mm3 178 173 164 189 196   NEUTROPHIL % % 66.4 68.4 64.2 59.4 84.6*   LYMPHOCYTE % % 19.3* 19.6 21.3 26.0 11.4*   NEUTROS ABS 10*3/mm3 4.90 5.80 4.10 3.00 4.60   LYMPHS ABS 10*3/mm3 1.40 1.60 1.40 1.30 0.60*     Inflammatory markers  Results from last 7 days   Lab Units 08/13/22  0421 08/12/22  0857 08/11/22  0459 08/10/22  1104 08/10/22  0511 08/09/22  1532 08/09/22  1004 08/09/22  0422   CRP mg/dL 1.68* 1.19* 0.95*  --  2.21*  --   --  2.67*   FERRITIN ng/mL 912.10* 926.40* 898.10*  --  882.80*  --   --   --    CK TOTAL U/L  --   --   --   --   --   --   --  168   D DIMER QUANT mg/L (FEU)  --   --   --   --  1.64*  --   --   --    PROCALCITONIN ng/mL 0.12 0.10 0.11  --  0.16  --   --  0.28*   LDH U/L 194 215 187  --  195  --   --   --    ALK PHOS  "U/L 68 69 63  --  68  --   --  80   AST (SGOT) U/L 40 41* 38  --  35  --   --  44*   ALT (SGPT) U/L 33 31 30  --  30  --   --  36   TROPONIN T ng/mL  --   --   --  0.067* 0.055* 0.050* 0.059* 0.066*       Poor COVID-19 outcomes associated with:  Neutrophil:Lymphocyte ratio >3.5  Thrombocytopenia, lymphopenia  LFT's >5x upper limit of normal  LDH >400      Assessment & Plan      Brief Patient Summary:  Shalom Alexis is a 87 y.o. male who tested positive for COVID-19 and flu B at [facility].  He presented with complaints of abdominal pain, diarrhea, decreased oral intake, and \"feeling bad all over.\"  Apparently the diarrhea has been going on for over 2 weeks.  He was found to have acute renal failure with BUN of 101 and creatinine 5.3 potassium 5.1.  He was also reportedly hypoxic requiring 2 L O2.        amLODIPine, 5 mg, Oral, Q24H  ascorbic acid, 1,000 mg, Oral, Daily  enoxaparin, 30 mg, Subcutaneous, Q24H  fenofibrate, 48 mg, Oral, Daily  insulin lispro, 0-14 Units, Subcutaneous, 4x Daily With Meals & Nightly  pantoprazole, 40 mg, Oral, Q AM  pregabalin, 150 mg, Oral, TID  sertraline, 100 mg, Oral, Daily  sodium chloride, 10 mL, Intravenous, Q12H  thiamine, 100 mg, Oral, Daily  traZODone, 50 mg, Oral, Nightly  cholecalciferol, 5,000 Units, Oral, Daily  zinc sulfate, 220 mg, Oral, Daily       sodium chloride, 50 mL/hr, Last Rate: 50 mL/hr (08/11/22 0729)         Active Hospital Problems:  Active Hospital Problems    Diagnosis    • **Acute renal failure (ARF) (HCC)    • Acute hypoxemic respiratory failure due to COVID-19 (HCC)    • Diarrhea of presumed infectious origin    • Elevated troponin    • CAD (coronary artery disease)    • History of MI (myocardial infarction)    • [place] (hard of hearing)    • Hematochezia    • Metabolic acidosis      Plan:   Acute hypoxemic respiratory failure due to Covid-19  -- Too late for Remdesivir  -- Dexamethasone x10 days  -- Supplemental O2 " as needed  -- Monitor lab trends  -- Pulmonology following     Acute renal failure  Metabolic acidosis  --Hold ACE inhibitor, Lasix  -- Nephrology following     Diarrhea - improving  --GI panel negative  --C. difficile negative  --Maintain hydration and electrolyte     Elevated troponin  -- ?  Demand ischemia vs kidney disease     DM Type II with hyperglycemia  -- glimepiride and januvia held on admission  -- [employer]ks AC/HS  -- SSI coverage as needed  -- check HgbA1c     GERD  --continue pantoprazole     Depression  --continue sertraline      Hard of hearing     DVT prophylaxis:  Medical and mechanical DVT prophylaxis orders are present.    CODE STATUS:    Code Status (Patient has no pulse and is not breathing): CPR (Attempt to Resuscitate)  Medical Interventions (Patient has pulse or is breathing): Full Support      Disposition:  I expect patient to be discharged pending clinical course and consultant recommendations.    This patient has been examined wearing appropriate Personal Protective Equipment. 08/13/22      Electronically signed by Valerie Feliz MD, 08/13/22, 21:11 EDT.  [facility] Hospitalist Team

## 2022-08-14 NOTE — PLAN OF CARE
Assessment: Shalom Alexis presents with functional mobility impairments which indicate the need for skilled intervention. Tolerating session today without incident. Pt would benefit from 24 hr care at first to make sure he's at PLOF. May not need O2 since he doesn't walk much at home. Plans on home with assist and HH. Will continue to follow and progress as tolerated.

## 2022-08-15 ENCOUNTER — APPOINTMENT (OUTPATIENT)
Dept: GENERAL RADIOLOGY | Facility: HOSPITAL | Age: 87
End: 2022-08-15

## 2022-08-15 LAB
ALBUMIN SERPL-MCNC: 3.2 G/DL (ref 3.5–5.2)
ALBUMIN/GLOB SERPL: 1.4 G/DL
ALP SERPL-CCNC: 64 U/L (ref 39–117)
ALT SERPL W P-5'-P-CCNC: 31 U/L (ref 1–41)
ANION GAP SERPL CALCULATED.3IONS-SCNC: 10 MMOL/L (ref 5–15)
AST SERPL-CCNC: 39 U/L (ref 1–40)
BASOPHILS # BLD AUTO: 0.1 10*3/MM3 (ref 0–0.2)
BASOPHILS NFR BLD AUTO: 1.1 % (ref 0–1.5)
BILIRUB SERPL-MCNC: 0.4 MG/DL (ref 0–1.2)
BUN SERPL-MCNC: 56 MG/DL (ref 8–23)
BUN/CREAT SERPL: 22.8 (ref 7–25)
CALCIUM SPEC-SCNC: 8.7 MG/DL (ref 8.6–10.5)
CHLORIDE SERPL-SCNC: 109 MMOL/L (ref 98–107)
CO2 SERPL-SCNC: 21 MMOL/L (ref 22–29)
CREAT SERPL-MCNC: 2.46 MG/DL (ref 0.76–1.27)
CRP SERPL-MCNC: 4.19 MG/DL (ref 0–0.5)
DEPRECATED RDW RBC AUTO: 47.3 FL (ref 37–54)
EGFRCR SERPLBLD CKD-EPI 2021: 25 ML/MIN/1.73
EOSINOPHIL # BLD AUTO: 0.1 10*3/MM3 (ref 0–0.4)
EOSINOPHIL NFR BLD AUTO: 1.8 % (ref 0.3–6.2)
ERYTHROCYTE [DISTWIDTH] IN BLOOD BY AUTOMATED COUNT: 14.6 % (ref 12.3–15.4)
FERRITIN SERPL-MCNC: 924.4 NG/ML (ref 30–400)
GLOBULIN UR ELPH-MCNC: 2.3 GM/DL
GLUCOSE BLDC GLUCOMTR-MCNC: 108 MG/DL (ref 70–105)
GLUCOSE BLDC GLUCOMTR-MCNC: 113 MG/DL (ref 70–105)
GLUCOSE BLDC GLUCOMTR-MCNC: 160 MG/DL (ref 70–105)
GLUCOSE BLDC GLUCOMTR-MCNC: 164 MG/DL (ref 70–105)
GLUCOSE SERPL-MCNC: 89 MG/DL (ref 65–99)
HCT VFR BLD AUTO: 32.8 % (ref 37.5–51)
HGB BLD-MCNC: 10.8 G/DL (ref 13–17.7)
HOLD SPECIMEN: NORMAL
LDH SERPL-CCNC: 167 U/L (ref 135–225)
LYMPHOCYTES # BLD AUTO: 2.1 10*3/MM3 (ref 0.7–3.1)
LYMPHOCYTES NFR BLD AUTO: 26.9 % (ref 19.6–45.3)
MAGNESIUM SERPL-MCNC: 1.6 MG/DL (ref 1.6–2.4)
MCH RBC QN AUTO: 30.2 PG (ref 26.6–33)
MCHC RBC AUTO-ENTMCNC: 32.9 G/DL (ref 31.5–35.7)
MCV RBC AUTO: 91.7 FL (ref 79–97)
MONOCYTES # BLD AUTO: 1 10*3/MM3 (ref 0.1–0.9)
MONOCYTES NFR BLD AUTO: 13.3 % (ref 5–12)
NEUTROPHILS NFR BLD AUTO: 4.4 10*3/MM3 (ref 1.7–7)
NEUTROPHILS NFR BLD AUTO: 56.9 % (ref 42.7–76)
NRBC BLD AUTO-RTO: 0.3 /100 WBC (ref 0–0.2)
PHOSPHATE SERPL-MCNC: 3.9 MG/DL (ref 2.5–4.5)
PLATELET # BLD AUTO: 210 10*3/MM3 (ref 140–450)
PMV BLD AUTO: 10.5 FL (ref 6–12)
POTASSIUM SERPL-SCNC: 4.2 MMOL/L (ref 3.5–5.2)
PROCALCITONIN SERPL-MCNC: 0.22 NG/ML (ref 0–0.25)
PROT SERPL-MCNC: 5.5 G/DL (ref 6–8.5)
RBC # BLD AUTO: 3.58 10*6/MM3 (ref 4.14–5.8)
SODIUM SERPL-SCNC: 140 MMOL/L (ref 136–145)
WBC NRBC COR # BLD: 7.7 10*3/MM3 (ref 3.4–10.8)

## 2022-08-15 PROCEDURE — 94664 DEMO&/EVAL PT USE INHALER: CPT

## 2022-08-15 PROCEDURE — 94799 UNLISTED PULMONARY SVC/PX: CPT

## 2022-08-15 PROCEDURE — 83735 ASSAY OF MAGNESIUM: CPT | Performed by: NURSE PRACTITIONER

## 2022-08-15 PROCEDURE — 83615 LACTATE (LD) (LDH) ENZYME: CPT | Performed by: INTERNAL MEDICINE

## 2022-08-15 PROCEDURE — 82962 GLUCOSE BLOOD TEST: CPT

## 2022-08-15 PROCEDURE — 84145 PROCALCITONIN (PCT): CPT | Performed by: INTERNAL MEDICINE

## 2022-08-15 PROCEDURE — 86140 C-REACTIVE PROTEIN: CPT | Performed by: INTERNAL MEDICINE

## 2022-08-15 PROCEDURE — 25010000002 MAGNESIUM SULFATE 2 GM/50ML SOLUTION: Performed by: INTERNAL MEDICINE

## 2022-08-15 PROCEDURE — 80053 COMPREHEN METABOLIC PANEL: CPT | Performed by: NURSE PRACTITIONER

## 2022-08-15 PROCEDURE — 97110 THERAPEUTIC EXERCISES: CPT

## 2022-08-15 PROCEDURE — 99232 SBSQ HOSP IP/OBS MODERATE 35: CPT | Performed by: INTERNAL MEDICINE

## 2022-08-15 PROCEDURE — 97535 SELF CARE MNGMENT TRAINING: CPT

## 2022-08-15 PROCEDURE — 63710000001 INSULIN LISPRO (HUMAN) PER 5 UNITS: Performed by: INTERNAL MEDICINE

## 2022-08-15 PROCEDURE — 84100 ASSAY OF PHOSPHORUS: CPT | Performed by: NURSE PRACTITIONER

## 2022-08-15 PROCEDURE — 82728 ASSAY OF FERRITIN: CPT | Performed by: INTERNAL MEDICINE

## 2022-08-15 PROCEDURE — 94760 N-INVAS EAR/PLS OXIMETRY 1: CPT

## 2022-08-15 PROCEDURE — 94761 N-INVAS EAR/PLS OXIMETRY MLT: CPT

## 2022-08-15 PROCEDURE — 94640 AIRWAY INHALATION TREATMENT: CPT

## 2022-08-15 PROCEDURE — 85025 COMPLETE CBC W/AUTO DIFF WBC: CPT | Performed by: NURSE PRACTITIONER

## 2022-08-15 PROCEDURE — 71045 X-RAY EXAM CHEST 1 VIEW: CPT

## 2022-08-15 PROCEDURE — 25010000002 ENOXAPARIN PER 10 MG: Performed by: NURSE PRACTITIONER

## 2022-08-15 RX ORDER — MAGNESIUM SULFATE HEPTAHYDRATE 40 MG/ML
2 INJECTION, SOLUTION INTRAVENOUS ONCE
Status: COMPLETED | OUTPATIENT
Start: 2022-08-15 | End: 2022-08-15

## 2022-08-15 RX ORDER — ALBUTEROL SULFATE 90 UG/1
2 AEROSOL, METERED RESPIRATORY (INHALATION)
Status: DISCONTINUED | OUTPATIENT
Start: 2022-08-15 | End: 2022-08-16

## 2022-08-15 RX ADMIN — INSULIN LISPRO 3 UNITS: 100 INJECTION, SOLUTION INTRAVENOUS; SUBCUTANEOUS at 21:33

## 2022-08-15 RX ADMIN — Medication 5000 UNITS: at 09:00

## 2022-08-15 RX ADMIN — PREGABALIN 150 MG: 75 CAPSULE ORAL at 09:00

## 2022-08-15 RX ADMIN — PREGABALIN 150 MG: 75 CAPSULE ORAL at 16:44

## 2022-08-15 RX ADMIN — ENOXAPARIN SODIUM 30 MG: 100 INJECTION SUBCUTANEOUS at 16:44

## 2022-08-15 RX ADMIN — MAGNESIUM SULFATE HEPTAHYDRATE 2 G: 2 INJECTION, SOLUTION INTRAVENOUS at 11:03

## 2022-08-15 RX ADMIN — Medication 100 MG: at 09:00

## 2022-08-15 RX ADMIN — ALBUTEROL SULFATE 2 PUFF: 108 INHALANT RESPIRATORY (INHALATION) at 20:40

## 2022-08-15 RX ADMIN — AMLODIPINE BESYLATE 5 MG: 5 TABLET ORAL at 09:00

## 2022-08-15 RX ADMIN — FENOFIBRATE 48 MG: 48 TABLET ORAL at 09:00

## 2022-08-15 RX ADMIN — PANTOPRAZOLE SODIUM 40 MG: 40 TABLET, DELAYED RELEASE ORAL at 06:00

## 2022-08-15 RX ADMIN — Medication 10 ML: at 21:34

## 2022-08-15 RX ADMIN — OXYCODONE HYDROCHLORIDE AND ACETAMINOPHEN 1000 MG: 500 TABLET ORAL at 09:00

## 2022-08-15 RX ADMIN — ALBUTEROL SULFATE 2 PUFF: 108 INHALANT RESPIRATORY (INHALATION) at 14:48

## 2022-08-15 RX ADMIN — INSULIN LISPRO 3 UNITS: 100 INJECTION, SOLUTION INTRAVENOUS; SUBCUTANEOUS at 16:44

## 2022-08-15 RX ADMIN — ZINC SULFATE 220 MG (50 MG) CAPSULE 220 MG: CAPSULE at 09:00

## 2022-08-15 RX ADMIN — SERTRALINE 100 MG: 100 TABLET, FILM COATED ORAL at 09:00

## 2022-08-15 RX ADMIN — Medication 10 ML: at 09:00

## 2022-08-15 RX ADMIN — TRAZODONE HYDROCHLORIDE 50 MG: 50 TABLET ORAL at 21:33

## 2022-08-15 RX ADMIN — PREGABALIN 150 MG: 75 CAPSULE ORAL at 21:32

## 2022-08-15 NOTE — PROGRESS NOTES
Daily Progress Note        Acute renal failure (ARF) (HCC)    Acute hypoxemic respiratory failure due to COVID-19 (HCC)    Diarrhea of presumed infectious origin    Hematochezia    Metabolic acidosis    CAD (coronary artery disease)    History of MI (myocardial infarction)    [place] (hard of hearing)    Elevated troponin      Assessment  Acute hypoxic respiratory failure due to COVID-19-tested + 8/9/2022  Acute renal failure  Diarrhea- improving  Elevated troponin  COPD  Type 2 diabetes  GERD  Depression  Hard of hearing  Former smoker    MRSA PCR + 8/9/2022        Plan  Add Proventil     Continue to titrate oxygen- Currently on 3 L NC  Finishing vitamin C, D, zinc  Electrolyte/Glycemic control  DVT/GI Prophylaxis-Lovenox and Protonix  Normal saline IV infusion at 75 mL/HR          COVID-19 LAB PANEL     COVID-19 test result  COVID19   Date Value Ref Range Status   08/09/2022 Detected (C) Not Detected - Ref. Range Final       COVID-19 Prognostic lab results  WBC with differential  Results from last 7 days   Lab Units 08/15/22  0148 08/14/22  0237 08/13/22  0421 08/12/22  0857 08/11/22  0459 08/10/22  0511 08/09/22  0422   WBC 10*3/mm3 7.70 7.30 7.40 8.40 6.40 5.10 5.50   PLATELETS 10*3/mm3 210 161 178 173 164 189 196   NEUTROPHIL % % 56.9 66.3 66.4 68.4 64.2 59.4 84.6*   LYMPHOCYTE % % 26.9 20.4 19.3* 19.6 21.3 26.0 11.4*   NEUTROS ABS 10*3/mm3 4.40 4.80 4.90 5.80 4.10 3.00 4.60   LYMPHS ABS 10*3/mm3 2.10 1.50 1.40 1.60 1.40 1.30 0.60*     Inflammatory markers  Results from last 7 days   Lab Units 08/15/22  0542 08/14/22  0237 08/13/22  0421 08/12/22  0857 08/11/22  0459 08/10/22  1104 08/10/22  0511 08/09/22  1532 08/09/22  1004 08/09/22  0422   CRP mg/dL 4.19* 3.12* 1.68* 1.19* 0.95*  --  2.21*  --   --  2.67*   FERRITIN ng/mL 924.40* 927.90* 912.10* 926.40* 898.10*  --  882.80*  --   --   --    CK TOTAL U/L  --   --   --   --   --   --   --   --   --  168   D DIMER QUANT mg/L (FEU)  --   --   --   --   --   --   1.64*  --   --   --    PROCALCITONIN ng/mL 0.22 0.15 0.12 0.10 0.11  --  0.16  --   --  0.28*   LDH U/L 167 168 194 215 187  --  195  --   --   --    ALK PHOS U/L 64 67 68 69 63  --  68  --   --  80   AST (SGOT) U/L 39 36 40 41* 38  --  35  --   --  44*   ALT (SGPT) U/L 31 31 33 31 30  --  30  --   --  36   TROPONIN T ng/mL  --   --   --   --   --  0.067* 0.055* 0.050* 0.059* 0.066*       Poor COVID-19 outcomes associated with:  Neutrophil:Lymphocyte ratio >3.5  Thrombocytopenia, lymphopenia  LFT's >5x upper limit of normal  LDH >400   LOS: 6 days     Subjective         Objective     Vital signs for last 24 hours:  Vitals:    08/14/22 2334 08/15/22 0330 08/15/22 0734 08/15/22 1025   BP: 99/60 113/63  120/69   BP Location: Right arm Right arm  Right arm   Patient Position: Lying Lying  Sitting   Pulse: 80 67 64 61   Resp: 18 18 16 16   Temp: 98.2 °F (36.8 °C) 97.9 °F (36.6 °C) 96.7 °F (35.9 °C) 97.6 °F (36.4 °C)   TempSrc: Oral Oral Oral Oral   SpO2: 98% 93% 95% 94%   Weight:       Height:           Intake/Output last 3 shifts:  I/O last 3 completed shifts:  In: 1440 [P.O.:1440]  Out: 300 [Urine:300]  Intake/Output this shift:  I/O this shift:  In: 120 [P.O.:120]  Out: -       Radiology  Imaging Results (Last 24 Hours)       ** No results found for the last 24 hours. **            Labs:  Results from last 7 days   Lab Units 08/15/22  0148   WBC 10*3/mm3 7.70   HEMOGLOBIN g/dL 10.8*   HEMATOCRIT % 32.8*   PLATELETS 10*3/mm3 210     Results from last 7 days   Lab Units 08/15/22  0542   SODIUM mmol/L 140   POTASSIUM mmol/L 4.2   CHLORIDE mmol/L 109*   CO2 mmol/L 21.0*   BUN mg/dL 56*   CREATININE mg/dL 2.46*   CALCIUM mg/dL 8.7   BILIRUBIN mg/dL 0.4   ALK PHOS U/L 64   ALT (SGPT) U/L 31   AST (SGOT) U/L 39   GLUCOSE mg/dL 89         Results from last 7 days   Lab Units 08/15/22  0542 08/14/22  0237 08/13/22  0421   ALBUMIN g/dL 3.20* 3.20* 3.50     Results from last 7 days   Lab Units 08/10/22  1104 08/10/22  0511  08/09/22  1532 08/09/22  1004 08/09/22  0422   CK TOTAL U/L  --   --   --   --  168   TROPONIN T ng/mL 0.067* 0.055* 0.050*   < > 0.066*    < > = values in this interval not displayed.         Results from last 7 days   Lab Units 08/15/22  0542   MAGNESIUM mg/dL 1.6     Results from last 7 days   Lab Units 08/09/22  0422   INR  1.21*   APTT seconds 32.9*               Meds:   SCHEDULE  amLODIPine, 5 mg, Oral, Q24H  ascorbic acid, 1,000 mg, Oral, Daily  enoxaparin, 30 mg, Subcutaneous, Q24H  insulin lispro, 0-14 Units, Subcutaneous, 4x Daily With Meals & Nightly  pantoprazole, 40 mg, Oral, Q AM  pregabalin, 150 mg, Oral, TID  sertraline, 100 mg, Oral, Daily  sodium chloride, 10 mL, Intravenous, Q12H  thiamine, 100 mg, Oral, Daily  traZODone, 50 mg, Oral, Nightly  cholecalciferol, 5,000 Units, Oral, Daily  zinc sulfate, 220 mg, Oral, Daily      Infusions  sodium chloride, 75 mL/hr, Last Rate: 75 mL/hr (08/15/22 1103)      PRNs    acetaminophen **OR** acetaminophen    aluminum-magnesium hydroxide-simethicone    dextrose    dextrose    glucagon (human recombinant)    hydrALAZINE    HYDROcodone-acetaminophen    nitroglycerin    ondansetron **OR** ondansetron    sodium chloride    Physical Exam:  Physical Exam  General Appearance:  Alert. No distress noted.   HEENT:  Normocephalic, without obvious abnormality, Conjunctiva/corneas clear,.  Normal external ear canals, Nares normal, no drainage     Neck:  Supple, symmetrical, trachea midline. No JVD.  Lungs /Chest wall: Few basal rales bilaterally respirations unlabored symmetrical wall movement.     Heart:  Regular rate and rhythm, systolic murmur PMI left sternal border  Abdomen: Soft, non-tender, no masses, no organomegaly.    Extremities: No edema, no clubbing or cyanosis      ROS  Review of Systems  Constitutional: Negative for chills, fever and malaise/fatigue.   HENT: Negative.    Eyes: Negative.    Cardiovascular: Negative.    Respiratory: Positive for cough and  shortness of breath, improving.    Skin: Negative.    Musculoskeletal: Negative.    Gastrointestinal: Negative.    Genitourinary: Negative.    Neurological: Negative.    Psychiatric/Behavioral: Negative.      I have reviewed current clinicals.     Electronically signed by JORDYN Ring, 08/15/22, 11:23 AM EDT.     The NP scribed the note for me, I have examined the patient and reviewed and verified the above findings and and I formulated the assessment and plan as documented

## 2022-08-15 NOTE — PROGRESS NOTES
"                                                                                                                                      Nephrology  Progress Note                                        [facility]    Patient Identification    Name: Shalom Alexis  Age: 87 y.o.  Sex: male  :  1935  MRN: [id number]      DATE OF SERVICE:  8/15/2022        Subective    Resting   Up on chair  Objective   Scheduled Meds:amLODIPine, 5 mg, Oral, Q24H  ascorbic acid, 1,000 mg, Oral, Daily  enoxaparin, 30 mg, Subcutaneous, Q24H  fenofibrate, 48 mg, Oral, Daily  insulin lispro, 0-14 Units, Subcutaneous, 4x Daily With Meals & Nightly  pantoprazole, 40 mg, Oral, Q AM  pregabalin, 150 mg, Oral, TID  sertraline, 100 mg, Oral, Daily  sodium chloride, 10 mL, Intravenous, Q12H  thiamine, 100 mg, Oral, Daily  traZODone, 50 mg, Oral, Nightly  cholecalciferol, 5,000 Units, Oral, Daily  zinc sulfate, 220 mg, Oral, Daily          Continuous Infusions:sodium chloride, 50 mL/hr, Last Rate: 50 mL/hr (22 0729)        PRN Meds:•  acetaminophen **OR** acetaminophen  •  aluminum-magnesium hydroxide-simethicone  •  dextrose  •  dextrose  •  glucagon (human recombinant)  •  hydrALAZINE  •  HYDROcodone-acetaminophen  •  nitroglycerin  •  ondansetron **OR** ondansetron  •  sodium chloride     Exam:  /63 (BP Location: Right arm, Patient Position: Lying)   Pulse 64   Temp 96.7 °F (35.9 °C) (Oral)   Resp 16   Ht 185.4 cm (73\")   Wt 94.4 kg (208 lb 1.8 oz)   SpO2 95%   BMI 27.46 kg/m²     Intake/Output last 3 shifts:  I/O last 3 completed shifts:  In: 1440 [P.O.:1440]  Out: 300 [Urine:300]    Intake/Output this shift:  No intake/output data recorded.    Physical exam:  General Appearance:  no distress   ithout obvious abnormality, atraumatic  Eyes:  PERRL, conjunctiva/corneas clear     Neck:  Supple,  no adenopathy;      Lungs:  Decreased BS occasion ronchi  Heart:  Regular rate and rhythm, S1 and S2 " normal  Abdomen:  Soft, non-tender, bowel sounds active   Extremities: trace edema  Pulses: 2+ and symmetric all extremities  Skin:  No rashes or lesions       Data Review:  All labs (24hrs):   Recent Results (from the past 24 hour(s))   POC Glucose Once    Collection Time: 08/14/22 12:03 PM    Specimen: Blood   Result Value Ref Range    Glucose 155 (H) 70 - 105 mg/dL   POC Glucose Once    Collection Time: 08/14/22  4:25 PM    Specimen: Blood   Result Value Ref Range    Glucose 180 (H) 70 - 105 mg/dL   POC Glucose Once    Collection Time: 08/14/22  8:24 PM    Specimen: Blood   Result Value Ref Range    Glucose 189 (H) 70 - 105 mg/dL   CBC Auto Differential    Collection Time: 08/15/22  1:48 AM    Specimen: Blood   Result Value Ref Range    WBC 7.70 3.40 - 10.80 10*3/mm3    RBC 3.58 (L) 4.14 - 5.80 10*6/mm3    Hemoglobin 10.8 (L) 13.0 - 17.7 g/dL    Hematocrit 32.8 (L) 37.5 - 51.0 %    MCV 91.7 79.0 - 97.0 fL    MCH 30.2 26.6 - 33.0 pg    MCHC 32.9 31.5 - 35.7 g/dL    RDW 14.6 12.3 - 15.4 %    RDW-SD 47.3 37.0 - 54.0 fl    MPV 10.5 6.0 - 12.0 fL    Platelets 210 140 - 450 10*3/mm3    Neutrophil % 56.9 42.7 - 76.0 %    Lymphocyte % 26.9 19.6 - 45.3 %    Monocyte % 13.3 (H) 5.0 - 12.0 %    Eosinophil % 1.8 0.3 - 6.2 %    Basophil % 1.1 0.0 - 1.5 %    Neutrophils, Absolute 4.40 1.70 - 7.00 10*3/mm3    Lymphocytes, Absolute 2.10 0.70 - 3.10 10*3/mm3    Monocytes, Absolute 1.00 (H) 0.10 - 0.90 10*3/mm3    Eosinophils, Absolute 0.10 0.00 - 0.40 10*3/mm3    Basophils, Absolute 0.10 0.00 - 0.20 10*3/mm3    nRBC 0.3 (H) 0.0 - 0.2 /100 WBC   Magnesium    Collection Time: 08/15/22  5:42 AM    Specimen: Blood   Result Value Ref Range    Magnesium 1.6 1.6 - 2.4 mg/dL   Phosphorus    Collection Time: 08/15/22  5:42 AM    Specimen: Blood   Result Value Ref Range    Phosphorus 3.9 2.5 - 4.5 mg/dL   Comprehensive Metabolic Panel    Collection Time: 08/15/22  5:42 AM    Specimen: Blood   Result Value Ref Range    Glucose 89 65 - 99  mg/dL    BUN 56 (H) 8 - 23 mg/dL    Creatinine 2.46 (H) 0.76 - 1.27 mg/dL    Sodium 140 136 - 145 mmol/L    Potassium 4.2 3.5 - 5.2 mmol/L    Chloride 109 (H) 98 - 107 mmol/L    CO2 21.0 (L) 22.0 - 29.0 mmol/L    Calcium 8.7 8.6 - 10.5 mg/dL    Total Protein 5.5 (L) 6.0 - 8.5 g/dL    Albumin 3.20 (L) 3.50 - 5.20 g/dL    ALT (SGPT) 31 1 - 41 U/L    AST (SGOT) 39 1 - 40 U/L    Alkaline Phosphatase 64 39 - 117 U/L    Total Bilirubin 0.4 0.0 - 1.2 mg/dL    Globulin 2.3 gm/dL    A/G Ratio 1.4 g/dL    BUN/Creatinine Ratio 22.8 7.0 - 25.0    Anion Gap 10.0 5.0 - 15.0 mmol/L    eGFR 25.0 (L) >60.0 mL/min/1.73   C-reactive Protein    Collection Time: 08/15/22  5:42 AM    Specimen: Blood   Result Value Ref Range    C-Reactive Protein 4.19 (H) 0.00 - 0.50 mg/dL   Procalcitonin    Collection Time: 08/15/22  5:42 AM    Specimen: Blood   Result Value Ref Range    Procalcitonin 0.22 0.00 - 0.25 ng/mL   Ferritin    Collection Time: 08/15/22  5:42 AM    Specimen: Blood   Result Value Ref Range    Ferritin 924.40 (H) 30.00 - 400.00 ng/mL   Lactate Dehydrogenase    Collection Time: 08/15/22  5:42 AM    Specimen: Blood   Result Value Ref Range     135 - 225 U/L   Gold Top - SST    Collection Time: 08/15/22  5:42 AM   Result Value Ref Range    Extra Tube Hold for add-ons.    POC Glucose Once    Collection Time: 08/15/22  7:19 AM    Specimen: Blood   Result Value Ref Range    Glucose 113 (H) 70 - 105 mg/dL          Imaging:  XR Chest 1 View    Result Date: 8/9/2022  Right basilar atelectasis  Electronically Signed By-Tony De La Torre On:8/9/2022 7:48 AM This report was finalized on [id number] by  Tony De La Torre, .      Assessment/Plan:     Acute renal failure (ARF) (HCC)    Acute hypoxemic respiratory failure due to COVID-19 (HCC)    Diarrhea of presumed infectious origin    Hematochezia    Metabolic acidosis    CAD (coronary artery disease)    History of MI (myocardial infarction)    [place] (hard of hearing)    Elevated  troponin     Acute kidney injury  Metabolic acidosis  Hyperkalemia  COVID-19 pneumonia  Elevated troponin  Hyperglycemia  Hard of hearing      Creatinine up to 2.4 from 2.2  Acidosis improving  DC bicarb drip  Currently on half-normal saline at 50 cc an hour increase the rate to 75

## 2022-08-15 NOTE — THERAPY TREATMENT NOTE
Subjective: Pt agreeable to therapeutic plan of care.  Cognition: oriented to Person, Place, Time and Situation  Pt very [place] and relies heavily on visual and tactile cues to complete tasks.     Objective:     Bed Mobility: SBA   Functional Transfers: SBA  Functional Ambulation: CGA    Grooming: Min-A  ADL Position: supported sitting  ADL Comments: up in chair    OT provided and reviewed BUE HEP. Pt completes 1 set x 10 reps of each exercise with fair understanding. Encouraged to complete 3x/daily to facilitate increased activity tolerance. Pt will require additional education to ensure carryover. Pt desaturated to 75% on 3L O2 with BUE exercise, though with limited desaturation noted with BLE. Pt c/o pain in LUE shoulder, requiring modification of BUE.    Vitals: Desaturates    Pain: 4 VAS L shoulder  Education: Provided education on importance of mobility and skilled verbal / tactile cueing throughout intervention.     Assessment: Shalom Alexis presents with ADL impairments below baseline abilities which indicate the need for continued skilled intervention while inpatient. Pt requires increased time to complete all tasks, and desaturates with minimal exertion despite 3L O2 NC. Completes BUE HEP to increase activity tolerance, but requires frequent rest breaks and modifications due to L shoulder pain. Tolerating session today without incident. Will continue to follow and progress as tolerated.     Plan/Recommendations:   Pt would benefit from Skilled Rehab placement at discharge from facility.   Pt desires Home with family assist at discharge. Pt cooperative; agreeable to therapeutic recommendations and plan of care.     Post-Tx Position: Up in Chair, Alarms activated and Call light and personal items within reach  PPE: gloves, surgical mask, eyewear protection

## 2022-08-15 NOTE — PLAN OF CARE
Goal Outcome Evaluation:  Plan of Care Reviewed With: patient        Progress: improving  Outcome Evaluation: Pt has an abrasion on the right foot.  Both lower extremities are red and blanchable with minor abrasions.

## 2022-08-15 NOTE — PROGRESS NOTES
[facility] Medicine Services Daily Progress Note    Patient Name: Shalom Alexis  : 1935  MRN: [id number]  Primary Care Physician:  Ron Hensley MD  Date of admission: 2022      Subjective      Chief Complaint: Abdominal pain, diarrhea, feeling bad    2022  Patient Reports no new complaints or acute events.    8/15/2022  The patient reports that his diarrhea is somewhat better.  The patient remains on IV fluids and on oxygen at 3 L/min with a saturation of 94%.    Review of Systems   Constitutional: Negative.   HENT: Negative.    Eyes: Negative.    Cardiovascular: Negative.    Respiratory: Positive for cough and shortness of breath.    Endocrine: Negative.    Hematologic/Lymphatic: Negative.    Skin: Negative.    Musculoskeletal: Negative.    Gastrointestinal: Positive for abdominal pain, diarrhea and nausea.        His vomiting has resolved and he reports that his issues with nausea and diarrhea have improved.   Genitourinary: Negative.    Neurological: Negative.    Psychiatric/Behavioral: Negative.    Allergic/Immunologic: Negative.    All other systems reviewed and are negative.        Objective      Vitals:   Temp:  [96.7 °F (35.9 °C)-98.8 °F (37.1 °C)] 98.8 °F (37.1 °C)  Heart Rate:  [58-85] 75  Resp:  [14-18] 16  BP: ()/(56-73) 110/65  Flow (L/min):  [1-3] 2      Vital signs reviewed.  Nursing notes reviewed.  General: well-developed and well-nourished, NAD  HEENT: NC/AT, EOMI, PERRLA, very [place]  Heart: RRR. No murmur   Chest: CTAB, no w/r/r, normal respiratory effort  Abdominal: Soft. NT/ND. Bowel sounds present.  There is no guarding or rebound and no abdominal tenderness to palpation.  Musculoskeletal: Normal ROM.  No edema. No calf tenderness.  Neurological: AAOx3, no focal deficits  Skin: Skin is warm and dry. No rash  Psychiatric: Normal mood and affect     Result Review    Result Review:  I have personally reviewed the results from the time of this  admission to 8/15/2022 19:55 EDT and agree with these findings:  [x]  Laboratory  [x]  Microbiology  [x]  Radiology  [x]  EKG/Telemetry   []  Cardiology/Vascular   []  Pathology  []  Old records  []  Other:   Most notable findings include    Wounds (last 24 hours)     LDA Wound     Row Name 08/15/22 1020 08/14/22 2140          Wound 08/13/22 1709 Left gluteal Pressure Injury    Wound - Properties Group Placement Date: 08/13/22  -LS Placement Time: 1709  -LS Present on Hospital Admission: --  -LS, unknown  Side: Left  -LS Location: gluteal  -LS Primary Wound Type: Pressure inj  -LS Additional Comments: stage 2 pressure injury  -LS     Pressure Injury Stage -- 2  -LM     Dressing Appearance -- open to air  -LM     Closure CHRISSY;Other (Comment)  patient just recently moved to chair and did not want to get up  -MM --     Drainage Amount -- none  -LM     Dressing Care -- open to air  -LM     Retired Wound - Properties Group Placement Date: 08/13/22  -LS Placement Time: 1709 -LS Present on Hospital Admission: --  -LS, unknown  Side: Left  -LS Location: gluteal  -LS Primary Wound Type: Pressure inj  -LS Additional Comments: stage 2 pressure injury  -LS     Retired Wound - Properties Group Date first assessed: 08/13/22  -LS Time first assessed: 1709 -LS Present on Hospital Admission: --  -LS, unknown  Side: Left  -LS Location: gluteal  -LS Primary Wound Type: Pressure inj  -LS Additional Comments: stage 2 pressure injury  -LS            Wound 08/14/22 2100 Right anterior foot Abrasion    Wound - Properties Group Placement Date: 08/14/22  -LM Placement Time: 2100  -LM Side: Right  -LM Orientation: anterior  -LM Location: foot  -LM Primary Wound Type: Abrasion  -LM Additional Comments: Pt states that he has had this wound for a while  -LM     Retired Wound - Properties Group Placement Date: 08/14/22  -LM Placement Time: 2100 -LM Side: Right  -LM Orientation: anterior  -LM Location: foot  -LM Primary Wound Type: Abrasion  -LM  Additional Comments: Pt states that he has had this wound for a while  -LM     Retired Wound - Properties Group Date first assessed: 08/14/22  -LM Time first assessed: 2100  -LM Side: Right  -LM Location: foot  -LM Primary Wound Type: Abrasion  -LM Additional Comments: Pt states that he has had this wound for a while  -LM           User Key  (r) = Recorded By, (t) = Taken By, (c) = Cosigned By    Initials Name Provider Type    LS Arelis Gómez, RN Registered Nurse    Sara Julian LPN Licensed Nurse    Lori Nielson RN Registered Nurse                 COVID-19 LAB PANEL     COVID-19 test result  COVID19   Date Value Ref Range Status   08/09/2022 Detected (C) Not Detected - Ref. Range Final       COVID-19 Prognostic lab results  WBC with differential  Results from last 7 days   Lab Units 08/15/22  0148 08/14/22  0237 08/13/22  0421 08/12/22  0857 08/11/22  0459 08/10/22  0511 08/09/22  0422   WBC 10*3/mm3 7.70 7.30 7.40 8.40 6.40 5.10 5.50   PLATELETS 10*3/mm3 210 161 178 173 164 189 196   NEUTROPHIL % % 56.9 66.3 66.4 68.4 64.2 59.4 84.6*   LYMPHOCYTE % % 26.9 20.4 19.3* 19.6 21.3 26.0 11.4*   NEUTROS ABS 10*3/mm3 4.40 4.80 4.90 5.80 4.10 3.00 4.60   LYMPHS ABS 10*3/mm3 2.10 1.50 1.40 1.60 1.40 1.30 0.60*     Inflammatory markers  Results from last 7 days   Lab Units 08/15/22  0542 08/14/22  0237 08/13/22  0421 08/12/22  0857 08/11/22  0459 08/10/22  1104 08/10/22  0511 08/09/22  1532 08/09/22  1004 08/09/22  0422   CRP mg/dL 4.19* 3.12* 1.68* 1.19* 0.95*  --  2.21*  --   --  2.67*   FERRITIN ng/mL 924.40* 927.90* 912.10* 926.40* 898.10*  --  882.80*  --   --   --    CK TOTAL U/L  --   --   --   --   --   --   --   --   --  168   D DIMER QUANT mg/L (FEU)  --   --   --   --   --   --  1.64*  --   --   --    PROCALCITONIN ng/mL 0.22 0.15 0.12 0.10 0.11  --  0.16  --   --  0.28*   LDH U/L 167 168 194 215 187  --  195  --   --   --    ALK PHOS U/L 64 67 68 69 63  --  68  --   --  80   AST (SGOT) U/L 39 36 40 41* 38  " --  35  --   --  44*   ALT (SGPT) U/L 31 31 33 31 30  --  30  --   --  36   TROPONIN T ng/mL  --   --   --   --   --  0.067* 0.055* 0.050* 0.059* 0.066*       Poor COVID-19 outcomes associated with:  Neutrophil:Lymphocyte ratio >3.5  Thrombocytopenia, lymphopenia  LFT's >5x upper limit of normal  LDH >400      Assessment & Plan      Brief Patient Summary:  Shalom Alexis is a 87 y.o. male who tested positive for COVID-19 and flu B at [facility].  He presented with complaints of abdominal pain, diarrhea, decreased oral intake, and \"feeling bad all over.\"  Apparently the diarrhea has been going on for over 2 weeks.  He was found to have acute renal failure with BUN of 101 and creatinine 5.3 potassium 5.1.  He was also reportedly hypoxic requiring 2 L O2.        albuterol sulfate HFA, 2 puff, Inhalation, 4x Daily - RT  amLODIPine, 5 mg, Oral, Q24H  ascorbic acid, 1,000 mg, Oral, Daily  enoxaparin, 30 mg, Subcutaneous, Q24H  insulin lispro, 0-14 Units, Subcutaneous, 4x Daily With Meals & Nightly  pantoprazole, 40 mg, Oral, Q AM  pregabalin, 150 mg, Oral, TID  sertraline, 100 mg, Oral, Daily  sodium chloride, 10 mL, Intravenous, Q12H  thiamine, 100 mg, Oral, Daily  traZODone, 50 mg, Oral, Nightly  cholecalciferol, 5,000 Units, Oral, Daily  zinc sulfate, 220 mg, Oral, Daily       sodium chloride, 75 mL/hr, Last Rate: 75 mL/hr (08/15/22 1103)         Active Hospital Problems:  Active Hospital Problems    Diagnosis    • **Acute renal failure (ARF) (HCC)    • Acute hypoxemic respiratory failure due to COVID-19 (HCC)    • Diarrhea of presumed infectious origin    • Elevated troponin    • CAD (coronary artery disease)    • History of MI (myocardial infarction)    • [place] (hard of hearing)    • Hematochezia    • Metabolic acidosis      Plan:   Acute hypoxemic respirato  Acute hypoxemic respiratory failure due to Covid-19  -- Too late for Remdesivir  -- Dexamethasone x10 " days  -- Supplemental O2 as needed  -- Monitor lab trends  -- Pulmonology following  -Patient showing some improvement    Acute renal failure  Metabolic acidosis  --Hold ACE inhibitor, Lasix  -- Nephrology following     Diarrhea - improving  --GI panel negative  --C. difficile negative  --Maintain hydration and electrolyte     Elevated troponin  -- ?  Demand ischemia vs kidney disease     DM Type II with hyperglycemia  -- glimepiride and januvia held on admission  -- AccuCheks AC/HS  -- SSI coverage as needed  -- check HgbA1c     GERD  --continue pantoprazole     Depression  --continue sertraline      Hard of hearing     DVT prophylaxis:  Medical and mechanical DVT prophylaxis orders are present.    CODE STATUS:    Code Status (Patient has no pulse and is not breathing): CPR (Attempt to Resuscitate)  Medical Interventions (Patient has pulse or is breathing): Full Support      Disposition:  I expect patient to be discharged pending clinical course and consultant recommendations.    This patient has been examined wearing appropriate Personal Protective Equipment. 08/15/22      Electronically signed by Amanda Keita MD, 08/15/22, 19:55 EDT.  [facility] Hospitalist Team

## 2022-08-15 NOTE — PLAN OF CARE
Goal Outcome Evaluation:   Patient has no complaints during the shift and no changes. Patient has been sitting in chair next to bed during shift. Will continue to monitor.

## 2022-08-15 NOTE — PLAN OF CARE
Shalom Alexis presents with ADL impairments below baseline abilities which indicate the need for continued skilled intervention while inpatient. Pt requires increased time to complete all tasks, and desaturates with minimal exertion despite 3L O2 NC. Completes BUE HEP to increase activity tolerance, but requires frequent rest breaks and modifications due to L shoulder pain. Tolerating session today without incident. Will continue to follow and progress as tolerated.

## 2022-08-16 LAB
ALBUMIN SERPL-MCNC: 3.1 G/DL (ref 3.5–5.2)
ALBUMIN/GLOB SERPL: 1.1 G/DL
ALP SERPL-CCNC: 69 U/L (ref 39–117)
ALT SERPL W P-5'-P-CCNC: 32 U/L (ref 1–41)
ANION GAP SERPL CALCULATED.3IONS-SCNC: 11 MMOL/L (ref 5–15)
AST SERPL-CCNC: 38 U/L (ref 1–40)
BASOPHILS # BLD AUTO: 0 10*3/MM3 (ref 0–0.2)
BASOPHILS NFR BLD AUTO: 0.2 % (ref 0–1.5)
BILIRUB SERPL-MCNC: 0.3 MG/DL (ref 0–1.2)
BUN SERPL-MCNC: 55 MG/DL (ref 8–23)
BUN/CREAT SERPL: 24.2 (ref 7–25)
CALCIUM SPEC-SCNC: 8.6 MG/DL (ref 8.6–10.5)
CHLORIDE SERPL-SCNC: 110 MMOL/L (ref 98–107)
CO2 SERPL-SCNC: 19 MMOL/L (ref 22–29)
CREAT SERPL-MCNC: 2.27 MG/DL (ref 0.76–1.27)
CRP SERPL-MCNC: 2.7 MG/DL (ref 0–0.5)
DEPRECATED RDW RBC AUTO: 46.4 FL (ref 37–54)
EGFRCR SERPLBLD CKD-EPI 2021: 27.6 ML/MIN/1.73
EOSINOPHIL # BLD AUTO: 0.2 10*3/MM3 (ref 0–0.4)
EOSINOPHIL NFR BLD AUTO: 3.1 % (ref 0.3–6.2)
ERYTHROCYTE [DISTWIDTH] IN BLOOD BY AUTOMATED COUNT: 14.5 % (ref 12.3–15.4)
FERRITIN SERPL-MCNC: 975.5 NG/ML (ref 30–400)
GLOBULIN UR ELPH-MCNC: 2.7 GM/DL
GLUCOSE BLDC GLUCOMTR-MCNC: 145 MG/DL (ref 70–105)
GLUCOSE BLDC GLUCOMTR-MCNC: 148 MG/DL (ref 70–105)
GLUCOSE BLDC GLUCOMTR-MCNC: 94 MG/DL (ref 70–105)
GLUCOSE SERPL-MCNC: 104 MG/DL (ref 65–99)
HCT VFR BLD AUTO: 34.8 % (ref 37.5–51)
HGB BLD-MCNC: 11.4 G/DL (ref 13–17.7)
LDH SERPL-CCNC: 175 U/L (ref 135–225)
LYMPHOCYTES # BLD AUTO: 2.2 10*3/MM3 (ref 0.7–3.1)
LYMPHOCYTES NFR BLD AUTO: 31.2 % (ref 19.6–45.3)
MAGNESIUM SERPL-MCNC: 2 MG/DL (ref 1.6–2.4)
MCH RBC QN AUTO: 29.6 PG (ref 26.6–33)
MCHC RBC AUTO-ENTMCNC: 32.7 G/DL (ref 31.5–35.7)
MCV RBC AUTO: 90.3 FL (ref 79–97)
MONOCYTES # BLD AUTO: 0.9 10*3/MM3 (ref 0.1–0.9)
MONOCYTES NFR BLD AUTO: 13 % (ref 5–12)
NEUTROPHILS NFR BLD AUTO: 3.7 10*3/MM3 (ref 1.7–7)
NEUTROPHILS NFR BLD AUTO: 52.5 % (ref 42.7–76)
NRBC BLD AUTO-RTO: 0.1 /100 WBC (ref 0–0.2)
PHOSPHATE SERPL-MCNC: 3.2 MG/DL (ref 2.5–4.5)
PLATELET # BLD AUTO: 162 10*3/MM3 (ref 140–450)
PMV BLD AUTO: 10.3 FL (ref 6–12)
POTASSIUM SERPL-SCNC: 4.3 MMOL/L (ref 3.5–5.2)
PROCALCITONIN SERPL-MCNC: 0.18 NG/ML (ref 0–0.25)
PROT SERPL-MCNC: 5.8 G/DL (ref 6–8.5)
RBC # BLD AUTO: 3.85 10*6/MM3 (ref 4.14–5.8)
SODIUM SERPL-SCNC: 140 MMOL/L (ref 136–145)
WBC NRBC COR # BLD: 7 10*3/MM3 (ref 3.4–10.8)

## 2022-08-16 PROCEDURE — 84100 ASSAY OF PHOSPHORUS: CPT | Performed by: NURSE PRACTITIONER

## 2022-08-16 PROCEDURE — 80053 COMPREHEN METABOLIC PANEL: CPT | Performed by: NURSE PRACTITIONER

## 2022-08-16 PROCEDURE — 94799 UNLISTED PULMONARY SVC/PX: CPT

## 2022-08-16 PROCEDURE — 82728 ASSAY OF FERRITIN: CPT | Performed by: INTERNAL MEDICINE

## 2022-08-16 PROCEDURE — 94664 DEMO&/EVAL PT USE INHALER: CPT

## 2022-08-16 PROCEDURE — 83735 ASSAY OF MAGNESIUM: CPT | Performed by: NURSE PRACTITIONER

## 2022-08-16 PROCEDURE — 25010000002 ENOXAPARIN PER 10 MG: Performed by: NURSE PRACTITIONER

## 2022-08-16 PROCEDURE — 94760 N-INVAS EAR/PLS OXIMETRY 1: CPT

## 2022-08-16 PROCEDURE — 86140 C-REACTIVE PROTEIN: CPT | Performed by: INTERNAL MEDICINE

## 2022-08-16 PROCEDURE — 82962 GLUCOSE BLOOD TEST: CPT

## 2022-08-16 PROCEDURE — 94761 N-INVAS EAR/PLS OXIMETRY MLT: CPT

## 2022-08-16 PROCEDURE — 85025 COMPLETE CBC W/AUTO DIFF WBC: CPT | Performed by: NURSE PRACTITIONER

## 2022-08-16 PROCEDURE — 83615 LACTATE (LD) (LDH) ENZYME: CPT | Performed by: INTERNAL MEDICINE

## 2022-08-16 PROCEDURE — 84145 PROCALCITONIN (PCT): CPT | Performed by: INTERNAL MEDICINE

## 2022-08-16 PROCEDURE — 99232 SBSQ HOSP IP/OBS MODERATE 35: CPT | Performed by: INTERNAL MEDICINE

## 2022-08-16 RX ORDER — ALBUTEROL SULFATE 90 UG/1
2 AEROSOL, METERED RESPIRATORY (INHALATION) EVERY 4 HOURS PRN
Status: DISCONTINUED | OUTPATIENT
Start: 2022-08-16 | End: 2022-08-20 | Stop reason: HOSPADM

## 2022-08-16 RX ADMIN — Medication 10 ML: at 21:41

## 2022-08-16 RX ADMIN — Medication 100 MG: at 09:47

## 2022-08-16 RX ADMIN — Medication 10 ML: at 09:47

## 2022-08-16 RX ADMIN — OXYCODONE HYDROCHLORIDE AND ACETAMINOPHEN 1000 MG: 500 TABLET ORAL at 09:47

## 2022-08-16 RX ADMIN — SERTRALINE 100 MG: 100 TABLET, FILM COATED ORAL at 09:47

## 2022-08-16 RX ADMIN — TRAZODONE HYDROCHLORIDE 50 MG: 50 TABLET ORAL at 21:42

## 2022-08-16 RX ADMIN — HYDROCODONE BITARTRATE AND ACETAMINOPHEN 1 TABLET: 5; 325 TABLET ORAL at 05:59

## 2022-08-16 RX ADMIN — ENOXAPARIN SODIUM 30 MG: 100 INJECTION SUBCUTANEOUS at 17:46

## 2022-08-16 RX ADMIN — PREGABALIN 150 MG: 75 CAPSULE ORAL at 21:42

## 2022-08-16 RX ADMIN — AMLODIPINE BESYLATE 5 MG: 5 TABLET ORAL at 09:47

## 2022-08-16 RX ADMIN — PREGABALIN 150 MG: 75 CAPSULE ORAL at 09:47

## 2022-08-16 RX ADMIN — ALBUTEROL SULFATE 2 PUFF: 108 INHALANT RESPIRATORY (INHALATION) at 06:30

## 2022-08-16 RX ADMIN — ZINC SULFATE 220 MG (50 MG) CAPSULE 220 MG: CAPSULE at 09:47

## 2022-08-16 RX ADMIN — Medication 5000 UNITS: at 09:47

## 2022-08-16 RX ADMIN — PREGABALIN 150 MG: 75 CAPSULE ORAL at 17:46

## 2022-08-16 RX ADMIN — ALBUTEROL SULFATE 2 PUFF: 108 INHALANT RESPIRATORY (INHALATION) at 11:10

## 2022-08-16 RX ADMIN — SODIUM CHLORIDE 75 ML/HR: 9 INJECTION, SOLUTION INTRAVENOUS at 02:03

## 2022-08-16 RX ADMIN — PANTOPRAZOLE SODIUM 40 MG: 40 TABLET, DELAYED RELEASE ORAL at 05:37

## 2022-08-16 NOTE — PROGRESS NOTES
"                                                                                                                                      Nephrology  Progress Note                                        [facility]    Patient Identification    Name: Shalom Alexis  Age: 87 y.o.  Sex: male  :  1935  MRN: [id number]      DATE OF SERVICE:  2022        Subective    Resting  Objective   Scheduled Meds:albuterol sulfate HFA, 2 puff, Inhalation, 4x Daily - RT  amLODIPine, 5 mg, Oral, Q24H  ascorbic acid, 1,000 mg, Oral, Daily  enoxaparin, 30 mg, Subcutaneous, Q24H  insulin lispro, 0-14 Units, Subcutaneous, 4x Daily With Meals & Nightly  pantoprazole, 40 mg, Oral, Q AM  pregabalin, 150 mg, Oral, TID  sertraline, 100 mg, Oral, Daily  sodium chloride, 10 mL, Intravenous, Q12H  thiamine, 100 mg, Oral, Daily  traZODone, 50 mg, Oral, Nightly  cholecalciferol, 5,000 Units, Oral, Daily  zinc sulfate, 220 mg, Oral, Daily          Continuous Infusions:sodium chloride, 75 mL/hr, Last Rate: 75 mL/hr (22 0203)        PRN Meds:•  acetaminophen **OR** acetaminophen  •  aluminum-magnesium hydroxide-simethicone  •  dextrose  •  dextrose  •  glucagon (human recombinant)  •  hydrALAZINE  •  HYDROcodone-acetaminophen  •  nitroglycerin  •  ondansetron **OR** ondansetron  •  sodium chloride     Exam:  BP 98/50 (BP Location: Right arm, Patient Position: Sitting)   Pulse 68   Temp 97.6 °F (36.4 °C) (Oral)   Resp 18   Ht 185.4 cm (73\")   Wt 94.7 kg (208 lb 12.4 oz)   SpO2 97%   BMI 27.54 kg/m²     Intake/Output last 3 shifts:  I/O last 3 completed shifts:  In: 960 [P.O.:960]  Out: 300 [Urine:300]    Intake/Output this shift:  No intake/output data recorded.    Physical exam:  General Appearance:  no distress   ithout obvious abnormality, atraumatic  Eyes:  PERRL, conjunctiva/corneas clear     Neck:  Supple,  no adenopathy;      Lungs:  Decreased BS occasion ronchi  Heart:  Regular rate and rhythm, S1 and S2 " normal  Abdomen:  Soft, non-tender, bowel sounds active   Extremities: trace edema  Pulses: 2+ and symmetric all extremities  Skin:  No rashes or lesions       Data Review:  All labs (24hrs):   Recent Results (from the past 24 hour(s))   POC Glucose Once    Collection Time: 08/15/22 12:23 PM    Specimen: Blood   Result Value Ref Range    Glucose 108 (H) 70 - 105 mg/dL   POC Glucose Once    Collection Time: 08/15/22  4:02 PM    Specimen: Blood   Result Value Ref Range    Glucose 164 (H) 70 - 105 mg/dL   POC Glucose Once    Collection Time: 08/15/22  9:09 PM    Specimen: Blood   Result Value Ref Range    Glucose 160 (H) 70 - 105 mg/dL   Magnesium    Collection Time: 08/16/22 12:05 AM    Specimen: Blood   Result Value Ref Range    Magnesium 2.0 1.6 - 2.4 mg/dL   Phosphorus    Collection Time: 08/16/22 12:05 AM    Specimen: Blood   Result Value Ref Range    Phosphorus 3.2 2.5 - 4.5 mg/dL   Comprehensive Metabolic Panel    Collection Time: 08/16/22 12:05 AM    Specimen: Blood   Result Value Ref Range    Glucose 104 (H) 65 - 99 mg/dL    BUN 55 (H) 8 - 23 mg/dL    Creatinine 2.27 (H) 0.76 - 1.27 mg/dL    Sodium 140 136 - 145 mmol/L    Potassium 4.3 3.5 - 5.2 mmol/L    Chloride 110 (H) 98 - 107 mmol/L    CO2 19.0 (L) 22.0 - 29.0 mmol/L    Calcium 8.6 8.6 - 10.5 mg/dL    Total Protein 5.8 (L) 6.0 - 8.5 g/dL    Albumin 3.10 (L) 3.50 - 5.20 g/dL    ALT (SGPT) 32 1 - 41 U/L    AST (SGOT) 38 1 - 40 U/L    Alkaline Phosphatase 69 39 - 117 U/L    Total Bilirubin 0.3 0.0 - 1.2 mg/dL    Globulin 2.7 gm/dL    A/G Ratio 1.1 g/dL    BUN/Creatinine Ratio 24.2 7.0 - 25.0    Anion Gap 11.0 5.0 - 15.0 mmol/L    eGFR 27.6 (L) >60.0 mL/min/1.73   C-reactive Protein    Collection Time: 08/16/22 12:05 AM    Specimen: Blood   Result Value Ref Range    C-Reactive Protein 2.70 (H) 0.00 - 0.50 mg/dL   Procalcitonin    Collection Time: 08/16/22 12:05 AM    Specimen: Blood   Result Value Ref Range    Procalcitonin 0.18 0.00 - 0.25 ng/mL   Ferritin     Collection Time: 08/16/22 12:05 AM    Specimen: Blood   Result Value Ref Range    Ferritin 975.50 (H) 30.00 - 400.00 ng/mL   Lactate Dehydrogenase    Collection Time: 08/16/22 12:05 AM    Specimen: Blood   Result Value Ref Range     135 - 225 U/L   CBC Auto Differential    Collection Time: 08/16/22 12:05 AM    Specimen: Blood   Result Value Ref Range    WBC 7.00 3.40 - 10.80 10*3/mm3    RBC 3.85 (L) 4.14 - 5.80 10*6/mm3    Hemoglobin 11.4 (L) 13.0 - 17.7 g/dL    Hematocrit 34.8 (L) 37.5 - 51.0 %    MCV 90.3 79.0 - 97.0 fL    MCH 29.6 26.6 - 33.0 pg    MCHC 32.7 31.5 - 35.7 g/dL    RDW 14.5 12.3 - 15.4 %    RDW-SD 46.4 37.0 - 54.0 fl    MPV 10.3 6.0 - 12.0 fL    Platelets 162 140 - 450 10*3/mm3    Neutrophil % 52.5 42.7 - 76.0 %    Lymphocyte % 31.2 19.6 - 45.3 %    Monocyte % 13.0 (H) 5.0 - 12.0 %    Eosinophil % 3.1 0.3 - 6.2 %    Basophil % 0.2 0.0 - 1.5 %    Neutrophils, Absolute 3.70 1.70 - 7.00 10*3/mm3    Lymphocytes, Absolute 2.20 0.70 - 3.10 10*3/mm3    Monocytes, Absolute 0.90 0.10 - 0.90 10*3/mm3    Eosinophils, Absolute 0.20 0.00 - 0.40 10*3/mm3    Basophils, Absolute 0.00 0.00 - 0.20 10*3/mm3    nRBC 0.1 0.0 - 0.2 /100 WBC   POC Glucose Once    Collection Time: 08/16/22  7:47 AM    Specimen: Blood   Result Value Ref Range    Glucose 94 70 - 105 mg/dL          Imaging:  XR Chest 1 View    Result Date: 8/15/2022  Improved aeration of the right lung base compared 08/09/2022 chest x-ray. No acute cardiopulmonary abnormality is identified.  Electronically Signed By-Lori Donato MD On:8/15/2022 1:42 PM This report was finalized on [id number] by  Lori Donato MD.    XR Chest 1 View    Result Date: 8/9/2022  Right basilar atelectasis  Electronically Signed By-Tony De La Torre On:8/9/2022 7:48 AM This report was finalized on [id number] by  Tony De La Torre, .      Assessment/Plan:     Acute renal failure (ARF) (HCC)    Acute hypoxemic respiratory failure due to COVID-19 (HCC)    Diarrhea of  presumed infectious origin    Hematochezia    Metabolic acidosis    CAD (coronary artery disease)    History of MI (myocardial infarction)    [place] (hard of hearing)    Elevated troponin     Acute kidney injury  Metabolic acidosis  Hyperkalemia  COVID-19 pneumonia  Elevated troponin  Hyperglycemia  Hard of hearing      Creatinine  2.2- 2.4  Acidosis improving   on half-normal saline at 75

## 2022-08-16 NOTE — PROGRESS NOTES
Nutrition Services    Patient Name: Shalom Alexis  YOB: 1935  MRN: [id number]  Admission date: 8/9/2022    PPE Documentation        PPE Worn By Provider Did not enter room for this encounter   PPE Worn By Patient  N/A     PROGRESS NOTE      Encounter Information: Progress note to monitor po intake. Current intakes are documented as good. GI symptoms improved per MD notes.       PO Diet: Diet Cardiac, Diabetic/Consistent Carbs; Healthy Heart; Diabetic - Consistent Carb   PO Supplements:    PO Intake:  100%       Nutrition support orders:    Nutrition support review:        Labs (reviewed below): BUN/Creat elev        GI Function:  Stool Output  Stool Unmeasured Occurrence: 0 (08/16/22 0100)  Bowel Incontinence: No (08/16/22 0100)  Stool Amount: large (08/15/22 1838)          Nutrition Intervention: Continue current CCHO/HH diet as ordered       Results from last 7 days   Lab Units 08/16/22  0005 08/15/22  0542 08/14/22  0237   SODIUM mmol/L 140 140 141   POTASSIUM mmol/L 4.3 4.2 4.5   CHLORIDE mmol/L 110* 109* 108*   CO2 mmol/L 19.0* 21.0* 22.0   BUN mg/dL 55* 56* 52*   CREATININE mg/dL 2.27* 2.46* 2.21*   CALCIUM mg/dL 8.6 8.7 8.9   BILIRUBIN mg/dL 0.3 0.4 0.4   ALK PHOS U/L 69 64 67   ALT (SGPT) U/L 32 31 31   AST (SGOT) U/L 38 39 36   GLUCOSE mg/dL 104* 89 109*     Results from last 7 days   Lab Units 08/16/22  0005 08/15/22  0542 08/15/22  0148 08/14/22  0237   MAGNESIUM mg/dL 2.0 1.6  --  1.5*   PHOSPHORUS mg/dL 3.2 3.9  --  3.2   HEMOGLOBIN g/dL 11.4*  --    < > 11.6*   HEMATOCRIT % 34.8*  --    < > 34.7*    < > = values in this interval not displayed.     COVID19   Date Value Ref Range Status   08/09/2022 Detected (C) Not Detected - Ref. Range Final     Lab Results   Component Value Date    HGBA1C 7.1 (H) 08/11/2022         RD to follow up per protocol.    Electronically signed by:  Neelima Mosley RD  08/16/22 11:33 EDT

## 2022-08-16 NOTE — PLAN OF CARE
Goal Outcome Evaluation:  Plan of Care Reviewed With: patient        Progress: improving  Outcome Evaluation: Pt has an abrasion on the right foot.  Both lower extremities are red and blanchable with minor abrasions.    Pt pressure wound on coccyx has improved to red and blanchable.  Will continue to monitor.

## 2022-08-16 NOTE — PROGRESS NOTES
[facility] Medicine Services Daily Progress Note    Patient Name: Shalom Alexis  : 1935  MRN: [id number]  Primary Care Physician:  Ron Hensley MD  Date of admission: 2022      Subjective      Chief Complaint: Abdominal pain, diarrhea, feeling bad    2022  Patient Reports no new complaints or acute events.    8/15/2022  The patient reports that his diarrhea is somewhat better.  The patient remains on IV fluids and on oxygen at 3 L/min with a saturation of 94%.    2022  Overall the patient continues to show some improvement.  His diarrhea is abating.  Overall he is starting to feel better.    Review of Systems   Constitutional: Negative.   HENT: Negative.    Eyes: Negative.    Cardiovascular: Negative.    Respiratory: Positive for cough and shortness of breath.    Endocrine: Negative.    Hematologic/Lymphatic: Negative.    Skin: Negative.    Musculoskeletal: Negative.    Gastrointestinal: Positive for diarrhea.        His vomiting has resolved and he reports that his issues with nausea and diarrhea have improved.   Genitourinary: Negative.    Neurological: Negative.    Psychiatric/Behavioral: Negative.    Allergic/Immunologic: Negative.    All other systems reviewed and are negative.        Objective      Vitals:   Temp:  [96.7 °F (35.9 °C)-98.8 °F (37.1 °C)] 97.6 °F (36.4 °C)  Heart Rate:  [60-76] 73  Resp:  [16-18] 18  BP: ()/(45-76) 151/76  Flow (L/min):  [2-3] 2      Vital signs reviewed.  Nursing notes reviewed.  General: well-developed and well-nourished, NAD  HEENT: NC/AT, EOMI, PERRLA, very [place]  Heart: RRR. No murmur, S3 or S4  Chest: CTAB, no w/r/r, normal respiratory effort  Abdominal: Soft. NT/ND. Bowel sounds present.  There is no guarding or rebound and no abdominal tenderness to palpation.  Musculoskeletal: Normal ROM.  No edema. No calf tenderness.  Neurological: AAOx3, no focal deficits  Skin: Skin is warm and dry. No rash  Psychiatric:  Normal mood and affect     Result Review    Result Review:  I have personally reviewed the results from the time of this admission to 8/16/2022 18:21 EDT and agree with these findings:  [x]  Laboratory  [x]  Microbiology  [x]  Radiology  [x]  EKG/Telemetry   []  Cardiology/Vascular   []  Pathology  []  Old records  []  Other:   Most notable findings include    Wounds (last 24 hours)     LDA Wound     Row Name 08/16/22 0850 08/15/22 2040          Wound 08/13/22 1709 Left gluteal Pressure Injury    Wound - Properties Group Placement Date: 08/13/22  -LS Placement Time: 1709 -LS Present on Hospital Admission: --  -LS, unknown  Side: Left  -LS Location: gluteal  -LS Primary Wound Type: Pressure inj  -LS Additional Comments: stage 2 pressure injury  -LS     Dressing Appearance -- open to air  -LM     Closure CHRISSY;Other (Comment)  patient just recently moved to chair and did not want to get up  -DP --     Drainage Amount none  -DP none  -LM     Dressing Care -- open to air  -LM     Retired Wound - Properties Group Placement Date: 08/13/22  -LS Placement Time: 1709 -LS Present on Hospital Admission: --  -LS, unknown  Side: Left  -LS Location: gluteal  -LS Primary Wound Type: Pressure inj  -LS Additional Comments: stage 2 pressure injury  -LS     Retired Wound - Properties Group Date first assessed: 08/13/22  -LS Time first assessed: 1709 -LS Present on Hospital Admission: --  -LS, unknown  Side: Left  -LS Location: gluteal  -LS Primary Wound Type: Pressure inj  -LS Additional Comments: stage 2 pressure injury  -LS            Wound 08/14/22 2100 Right anterior foot Abrasion    Wound - Properties Group Placement Date: 08/14/22  -LM Placement Time: 2100  -LM Side: Right  -LM Orientation: anterior  -LM Location: foot  -LM Primary Wound Type: Abrasion  -LM Additional Comments: Pt states that he has had this wound for a while  -LM     Dressing Appearance -- dry;intact  -LM     Retired Wound - Properties Group Placement Date:  08/14/22  -LM Placement Time: 2100  -LM Side: Right  -LM Orientation: anterior  -LM Location: foot  -LM Primary Wound Type: Abrasion  -LM Additional Comments: Pt states that he has had this wound for a while  -LM     Retired Wound - Properties Group Date first assessed: 08/14/22  -LM Time first assessed: 2100  -LM Side: Right  -LM Location: foot  -LM Primary Wound Type: Abrasion  -LM Additional Comments: Pt states that he has had this wound for a while  -LM           User Key  (r) = Recorded By, (t) = Taken By, (c) = Cosigned By    Initials Name Provider Type    LS Arelis Gómez, RN Registered Nurse    DP Reina Wang, RN Registered Nurse    Lori Nielson, RN Registered Nurse                 COVID-19 LAB PANEL     COVID-19 test result  COVID19   Date Value Ref Range Status   08/09/2022 Detected (C) Not Detected - Ref. Range Final       COVID-19 Prognostic lab results  WBC with differential  Results from last 7 days   Lab Units 08/16/22  0005 08/15/22  0148 08/14/22  0237 08/13/22  0421 08/12/22  0857 08/11/22  0459 08/10/22  0511   WBC 10*3/mm3 7.00 7.70 7.30 7.40 8.40 6.40 5.10   PLATELETS 10*3/mm3 162 210 161 178 173 164 189   NEUTROPHIL % % 52.5 56.9 66.3 66.4 68.4 64.2 59.4   LYMPHOCYTE % % 31.2 26.9 20.4 19.3* 19.6 21.3 26.0   NEUTROS ABS 10*3/mm3 3.70 4.40 4.80 4.90 5.80 4.10 3.00   LYMPHS ABS 10*3/mm3 2.20 2.10 1.50 1.40 1.60 1.40 1.30     Inflammatory markers  Results from last 7 days   Lab Units 08/16/22  0005 08/15/22  0542 08/14/22  0237 08/13/22  0421 08/12/22  0857 08/11/22  0459 08/10/22  1104 08/10/22  0511   CRP mg/dL 2.70* 4.19* 3.12* 1.68* 1.19* 0.95*  --  2.21*   FERRITIN ng/mL 975.50* 924.40* 927.90* 912.10* 926.40* 898.10*  --  882.80*   D DIMER QUANT mg/L (FEU)  --   --   --   --   --   --   --  1.64*   PROCALCITONIN ng/mL 0.18 0.22 0.15 0.12 0.10 0.11  --  0.16   LDH U/L 175 167 168 194 215 187  --  195   ALK PHOS U/L 69 64 67 68 69 63  --  68   AST (SGOT) U/L 38 39 36 40 41* 38  --  35  "  ALT (SGPT) U/L 32 31 31 33 31 30  --  30   TROPONIN T ng/mL  --   --   --   --   --   --  0.067* 0.055*       Poor COVID-19 outcomes associated with:  Neutrophil:Lymphocyte ratio >3.5  Thrombocytopenia, lymphopenia  LFT's >5x upper limit of normal  LDH >400      Assessment & Plan      Brief Patient Summary:  Shalom Alexis is a 87 y.o. male who tested positive for COVID-19 and flu B at [facility].  He presented with complaints of abdominal pain, diarrhea, decreased oral intake, and \"feeling bad all over.\"  Apparently the diarrhea has been going on for over 2 weeks.  He was found to have acute renal failure with BUN of 101 and creatinine 5.3 potassium 5.1.  He was also reportedly hypoxic requiring 2 L O2.        ascorbic acid, 1,000 mg, Oral, Daily  enoxaparin, 30 mg, Subcutaneous, Q24H  insulin lispro, 0-14 Units, Subcutaneous, 4x Daily With Meals & Nightly  pantoprazole, 40 mg, Oral, Q AM  pregabalin, 150 mg, Oral, TID  sertraline, 100 mg, Oral, Daily  sodium chloride, 10 mL, Intravenous, Q12H  thiamine, 100 mg, Oral, Daily  traZODone, 50 mg, Oral, Nightly  cholecalciferol, 5,000 Units, Oral, Daily  zinc sulfate, 220 mg, Oral, Daily       sodium chloride, 75 mL/hr, Last Rate: 75 mL/hr (08/16/22 0203)         Active Hospital Problems:  Active Hospital Problems    Diagnosis    • **Acute renal failure (ARF) (HCC)    • Acute hypoxemic respiratory failure due to COVID-19 (HCC)    • Diarrhea of presumed infectious origin    • Elevated troponin    • CAD (coronary artery disease)    • History of MI (myocardial infarction)    • [place] (hard of hearing)    • Hematochezia    • Metabolic acidosis      Plan:   Acute hypoxemic respirato  Acute hypoxemic respiratory failure due to Covid-19  -- Too late for Remdesivir  -- Dexamethasone x10 days  -- Supplemental O2 as needed  -- Monitor lab trends  -- Pulmonology following  -Patient showing some improvement       -Hopefully home in " a.m.    Acute renal failure  Metabolic acidosis  --Hold ACE inhibitor, Lasix  -- Nephrology following     Diarrhea - improving  --GI panel negative  --C. difficile negative  --Maintain hydration and electrolyte     Elevated troponin  -- ?  Demand ischemia vs kidney disease     DM Type II with hyperglycemia  -- glimepiride and januvia held on admission  -- [employer] AC/HS  -- SSI coverage as needed  -- check HgbA1c     GERD  --continue pantoprazole     Depression  --continue sertraline      Hard of hearing     DVT prophylaxis:  Medical and mechanical DVT prophylaxis orders are present.    CODE STATUS:    Code Status (Patient has no pulse and is not breathing): CPR (Attempt to Resuscitate)  Medical Interventions (Patient has pulse or is breathing): Full Support      Disposition:  I expect patient to be discharged pending clinical course and consultant recommendations.    This patient has been examined wearing appropriate Personal Protective Equipment. 08/16/22      Electronically signed by Amanda Keita MD, 08/16/22, 18:21 EDT.  [facility] Hospitalist Team

## 2022-08-16 NOTE — PROGRESS NOTES
Daily Progress Note        Acute renal failure (ARF) (HCC)    Acute hypoxemic respiratory failure due to COVID-19 (HCC)    Diarrhea of presumed infectious origin    Hematochezia    Metabolic acidosis    CAD (coronary artery disease)    History of MI (myocardial infarction)    [place] (hard of hearing)    Elevated troponin      Assessment  Acute hypoxic respiratory failure due to COVID-19-tested + 8/9/2022  Acute renal failure  Diarrhea- improving  Elevated troponin  COPD  Type 2 diabetes  GERD  Depression  Hard of hearing  Former smoker    MRSA PCR + 8/9/2022        Plan    Continue to titrate oxygen- Currently on 3 L NC  Proventil  Vitamin C, D, zinc  Electrolyte/Glycemic control  DVT/GI Prophylaxis-Lovenox and Protonix  Normal saline IV infusion at 75 mL/HR  PT/OT following- requests postacute care/LTACH        COVID-19 LAB PANEL     COVID-19 test result  COVID19   Date Value Ref Range Status   08/09/2022 Detected (C) Not Detected - Ref. Range Final       COVID-19 Prognostic lab results  WBC with differential  Results from last 7 days   Lab Units 08/16/22  0005 08/15/22  0148 08/14/22  0237 08/13/22  0421 08/12/22  0857 08/11/22  0459 08/10/22  0511   WBC 10*3/mm3 7.00 7.70 7.30 7.40 8.40 6.40 5.10   PLATELETS 10*3/mm3 162 210 161 178 173 164 189   NEUTROPHIL % % 52.5 56.9 66.3 66.4 68.4 64.2 59.4   LYMPHOCYTE % % 31.2 26.9 20.4 19.3* 19.6 21.3 26.0   NEUTROS ABS 10*3/mm3 3.70 4.40 4.80 4.90 5.80 4.10 3.00   LYMPHS ABS 10*3/mm3 2.20 2.10 1.50 1.40 1.60 1.40 1.30     Inflammatory markers  Results from last 7 days   Lab Units 08/16/22  0005 08/15/22  0542 08/14/22  0237 08/13/22  0421 08/12/22  0857 08/11/22  0459 08/10/22  1104 08/10/22  0511 08/09/22  1532   CRP mg/dL 2.70* 4.19* 3.12* 1.68* 1.19* 0.95*  --  2.21*  --    FERRITIN ng/mL 975.50* 924.40* 927.90* 912.10* 926.40* 898.10*  --  882.80*  --    D DIMER QUANT mg/L (FEU)  --   --   --   --   --   --   --  1.64*  --    PROCALCITONIN ng/mL 0.18 0.22 0.15 0.12 0.10  0.11  --  0.16  --    LDH U/L 175 167 168 194 215 187  --  195  --    ALK PHOS U/L 69 64 67 68 69 63  --  68  --    AST (SGOT) U/L 38 39 36 40 41* 38  --  35  --    ALT (SGPT) U/L 32 31 31 33 31 30  --  30  --    TROPONIN T ng/mL  --   --   --   --   --   --  0.067* 0.055* 0.050*       Poor COVID-19 outcomes associated with:  Neutrophil:Lymphocyte ratio >3.5  Thrombocytopenia, lymphopenia  LFT's >5x upper limit of normal  LDH >400   LOS: 7 days     Subjective         Objective     Vital signs for last 24 hours:  Vitals:    08/16/22 0749 08/16/22 1100 08/16/22 1110 08/16/22 1112   BP: 98/50 (!) 83/45     BP Location: Right arm Right arm     Patient Position: Sitting Sitting     Pulse: 68 68 61 61   Resp: 18 18 18 18   Temp: 97.6 °F (36.4 °C) 97.6 °F (36.4 °C)     TempSrc: Oral Oral     SpO2: 97% 96% 95%    Weight:       Height:           Intake/Output last 3 shifts:  I/O last 3 completed shifts:  In: 960 [P.O.:960]  Out: 300 [Urine:300]  Intake/Output this shift:  I/O this shift:  In: 540 [P.O.:540]  Out: -       Radiology  Imaging Results (Last 24 Hours)       Procedure Component Value Units Date/Time    XR Chest 1 View [[id number]] Collected: 08/15/22 1341     Updated: 08/15/22 1345    Narrative:      DATE OF EXAM:  8/15/2022 12:10 PM     PROCEDURE:  XR CHEST 1 VW-     INDICATIONS:  covid 19 follow up, shortness of air     COMPARISON:  AP chest x-ray 08/09/2022.     TECHNIQUE:   Single radiographic AP view of the chest was obtained.     FINDINGS:  There is improved aeration of the right lung base compared 08/09/2022  chest x-ray. Lungs are well-expanded and appear grossly clear. No  pneumothorax or large pleural effusion is seen. Cardiac silhouette is  mildly enlarged, but is exaggerated portable AP technique.        Impression:      Improved aeration of the right lung base compared 08/09/2022 chest  x-ray. No acute cardiopulmonary abnormality is identified.     Electronically Signed By-Lori Donato MD  On:8/15/2022 1:42 PM  This report was finalized on [id number] by  Lori Donato MD.            Labs:  Results from last 7 days   Lab Units 08/16/22  0005   WBC 10*3/mm3 7.00   HEMOGLOBIN g/dL 11.4*   HEMATOCRIT % 34.8*   PLATELETS 10*3/mm3 162     Results from last 7 days   Lab Units 08/16/22  0005   SODIUM mmol/L 140   POTASSIUM mmol/L 4.3   CHLORIDE mmol/L 110*   CO2 mmol/L 19.0*   BUN mg/dL 55*   CREATININE mg/dL 2.27*   CALCIUM mg/dL 8.6   BILIRUBIN mg/dL 0.3   ALK PHOS U/L 69   ALT (SGPT) U/L 32   AST (SGOT) U/L 38   GLUCOSE mg/dL 104*         Results from last 7 days   Lab Units 08/16/22  0005 08/15/22  0542 08/14/22  0237   ALBUMIN g/dL 3.10* 3.20* 3.20*     Results from last 7 days   Lab Units 08/10/22  1104 08/10/22  0511 08/09/22  1532   TROPONIN T ng/mL 0.067* 0.055* 0.050*         Results from last 7 days   Lab Units 08/16/22  0005   MAGNESIUM mg/dL 2.0                   Meds:   SCHEDULE  albuterol sulfate HFA, 2 puff, Inhalation, 4x Daily - RT  ascorbic acid, 1,000 mg, Oral, Daily  enoxaparin, 30 mg, Subcutaneous, Q24H  insulin lispro, 0-14 Units, Subcutaneous, 4x Daily With Meals & Nightly  pantoprazole, 40 mg, Oral, Q AM  pregabalin, 150 mg, Oral, TID  sertraline, 100 mg, Oral, Daily  sodium chloride, 10 mL, Intravenous, Q12H  thiamine, 100 mg, Oral, Daily  traZODone, 50 mg, Oral, Nightly  cholecalciferol, 5,000 Units, Oral, Daily  zinc sulfate, 220 mg, Oral, Daily      Infusions  sodium chloride, 75 mL/hr, Last Rate: 75 mL/hr (08/16/22 0203)      PRNs    acetaminophen **OR** acetaminophen    aluminum-magnesium hydroxide-simethicone    dextrose    dextrose    glucagon (human recombinant)    HYDROcodone-acetaminophen    nitroglycerin    ondansetron **OR** ondansetron    sodium chloride    Physical Exam:  Physical Exam  General Appearance:  Alert. No distress noted.   HEENT:  Normocephalic, without obvious abnormality, Conjunctiva/corneas clear,.  Normal external ear canals, Nares normal, no  drainage     Neck:  Supple, symmetrical, trachea midline. No JVD.  Lungs /Chest wall: Few basal rales bilaterally respirations unlabored symmetrical wall movement.     Heart:  Regular rate and rhythm, systolic murmur PMI left sternal border  Abdomen: Soft, non-tender, no masses, no organomegaly.    Extremities: No edema, no clubbing or cyanosis      ROS  Review of Systems  Constitutional: Negative for chills, fever and malaise/fatigue.   HENT: Negative.    Eyes: Negative.    Cardiovascular: Negative.    Respiratory: Positive for cough and shortness of breath, improving.    Skin: Negative.    Musculoskeletal: Negative.    Gastrointestinal: Negative.    Genitourinary: Negative.    Neurological: Negative.    Psychiatric/Behavioral: Negative.      I have reviewed current clinicals.     Electronically signed by JORDYN Ring, 08/16/22, 11:23 AM EDT.     The NP scribed the note for me, I have examined the patient and reviewed and verified the above findings and and I formulated the assessment and plan as documented

## 2022-08-16 NOTE — CASE MANAGEMENT/SOCIAL WORK
Continued Stay Note  [facility]     Patient Name: Shalom Alexis  MRN: [id number]  Today's Date: 8/16/2022    Admit Date: 8/9/2022     Discharge Plan     Row Name 08/16/22 1318       Plan    Plan DC plan: Home with family and [facility] (accepted, need order).    Plan Comments CM [facility] (who is also liaison for [facility]) that pt is accepted for [facility] services with [facility]. Called and spoke to patient's daughter Rose Marie over the phone and confirmed pt does not want to go to rehab. Daughter reports pt has all the needed medical equipment at home. Discussed IMM letter and emailed copy to her. Anticipate dc tomorrow pending neph and pulm clearance.              Phone communication or documentation only - no physical contact with patient or family.      Megan Naegele, RN      Office Phone: [phone]  Office Cell: [phone]

## 2022-08-17 LAB
ALBUMIN SERPL-MCNC: 3 G/DL (ref 3.5–5.2)
ALBUMIN/GLOB SERPL: 1.2 G/DL
ALP SERPL-CCNC: 70 U/L (ref 39–117)
ALT SERPL W P-5'-P-CCNC: 31 U/L (ref 1–41)
ANION GAP SERPL CALCULATED.3IONS-SCNC: 11 MMOL/L (ref 5–15)
AST SERPL-CCNC: 38 U/L (ref 1–40)
BASOPHILS # BLD AUTO: 0 10*3/MM3 (ref 0–0.2)
BASOPHILS # BLD AUTO: 0 10*3/MM3 (ref 0–0.2)
BASOPHILS NFR BLD AUTO: 0.3 % (ref 0–1.5)
BASOPHILS NFR BLD AUTO: 0.5 % (ref 0–1.5)
BILIRUB SERPL-MCNC: 0.2 MG/DL (ref 0–1.2)
BUN SERPL-MCNC: 52 MG/DL (ref 8–23)
BUN/CREAT SERPL: 18.9 (ref 7–25)
CALCIUM SPEC-SCNC: 8.3 MG/DL (ref 8.6–10.5)
CHLORIDE SERPL-SCNC: 111 MMOL/L (ref 98–107)
CO2 SERPL-SCNC: 18 MMOL/L (ref 22–29)
CREAT SERPL-MCNC: 2.75 MG/DL (ref 0.76–1.27)
CRP SERPL-MCNC: 1.85 MG/DL (ref 0–0.5)
DEPRECATED RDW RBC AUTO: 46.4 FL (ref 37–54)
DEPRECATED RDW RBC AUTO: 46.8 FL (ref 37–54)
EGFRCR SERPLBLD CKD-EPI 2021: 21.9 ML/MIN/1.73
EOSINOPHIL # BLD AUTO: 0.2 10*3/MM3 (ref 0–0.4)
EOSINOPHIL # BLD AUTO: 0.3 10*3/MM3 (ref 0–0.4)
EOSINOPHIL NFR BLD AUTO: 3 % (ref 0.3–6.2)
EOSINOPHIL NFR BLD AUTO: 3.5 % (ref 0.3–6.2)
ERYTHROCYTE [DISTWIDTH] IN BLOOD BY AUTOMATED COUNT: 14.6 % (ref 12.3–15.4)
ERYTHROCYTE [DISTWIDTH] IN BLOOD BY AUTOMATED COUNT: 14.6 % (ref 12.3–15.4)
FERRITIN SERPL-MCNC: 904.7 NG/ML (ref 30–400)
GLOBULIN UR ELPH-MCNC: 2.5 GM/DL
GLUCOSE BLDC GLUCOMTR-MCNC: 130 MG/DL (ref 70–105)
GLUCOSE BLDC GLUCOMTR-MCNC: 140 MG/DL (ref 70–105)
GLUCOSE BLDC GLUCOMTR-MCNC: 153 MG/DL (ref 70–105)
GLUCOSE BLDC GLUCOMTR-MCNC: 161 MG/DL (ref 70–105)
GLUCOSE SERPL-MCNC: 160 MG/DL (ref 65–99)
HCT VFR BLD AUTO: 31 % (ref 37.5–51)
HCT VFR BLD AUTO: 33.9 % (ref 37.5–51)
HGB BLD-MCNC: 10.3 G/DL (ref 13–17.7)
HGB BLD-MCNC: 11.3 G/DL (ref 13–17.7)
LDH SERPL-CCNC: 183 U/L (ref 135–225)
LYMPHOCYTES # BLD AUTO: 1.6 10*3/MM3 (ref 0.7–3.1)
LYMPHOCYTES # BLD AUTO: 1.8 10*3/MM3 (ref 0.7–3.1)
LYMPHOCYTES NFR BLD AUTO: 20.7 % (ref 19.6–45.3)
LYMPHOCYTES NFR BLD AUTO: 23.8 % (ref 19.6–45.3)
MAGNESIUM SERPL-MCNC: 1.9 MG/DL (ref 1.6–2.4)
MCH RBC QN AUTO: 30 PG (ref 26.6–33)
MCH RBC QN AUTO: 30.2 PG (ref 26.6–33)
MCHC RBC AUTO-ENTMCNC: 33.2 G/DL (ref 31.5–35.7)
MCHC RBC AUTO-ENTMCNC: 33.2 G/DL (ref 31.5–35.7)
MCV RBC AUTO: 90.4 FL (ref 79–97)
MCV RBC AUTO: 91 FL (ref 79–97)
MONOCYTES # BLD AUTO: 0.7 10*3/MM3 (ref 0.1–0.9)
MONOCYTES # BLD AUTO: 1 10*3/MM3 (ref 0.1–0.9)
MONOCYTES NFR BLD AUTO: 11.2 % (ref 5–12)
MONOCYTES NFR BLD AUTO: 11.3 % (ref 5–12)
NEUTROPHILS NFR BLD AUTO: 4 10*3/MM3 (ref 1.7–7)
NEUTROPHILS NFR BLD AUTO: 5.6 10*3/MM3 (ref 1.7–7)
NEUTROPHILS NFR BLD AUTO: 61.2 % (ref 42.7–76)
NEUTROPHILS NFR BLD AUTO: 64.5 % (ref 42.7–76)
NRBC BLD AUTO-RTO: 0 /100 WBC (ref 0–0.2)
NRBC BLD AUTO-RTO: 0 /100 WBC (ref 0–0.2)
PHOSPHATE SERPL-MCNC: 3.2 MG/DL (ref 2.5–4.5)
PLATELET # BLD AUTO: 163 10*3/MM3 (ref 140–450)
PLATELET # BLD AUTO: 189 10*3/MM3 (ref 140–450)
PMV BLD AUTO: 10.3 FL (ref 6–12)
PMV BLD AUTO: 10.5 FL (ref 6–12)
POTASSIUM SERPL-SCNC: 4.4 MMOL/L (ref 3.5–5.2)
PROCALCITONIN SERPL-MCNC: 0.19 NG/ML (ref 0–0.25)
PROT SERPL-MCNC: 5.5 G/DL (ref 6–8.5)
RBC # BLD AUTO: 3.42 10*6/MM3 (ref 4.14–5.8)
RBC # BLD AUTO: 3.72 10*6/MM3 (ref 4.14–5.8)
SODIUM SERPL-SCNC: 140 MMOL/L (ref 136–145)
WBC NRBC COR # BLD: 6.6 10*3/MM3 (ref 3.4–10.8)
WBC NRBC COR # BLD: 8.7 10*3/MM3 (ref 3.4–10.8)

## 2022-08-17 PROCEDURE — 99232 SBSQ HOSP IP/OBS MODERATE 35: CPT | Performed by: INTERNAL MEDICINE

## 2022-08-17 PROCEDURE — 80053 COMPREHEN METABOLIC PANEL: CPT | Performed by: NURSE PRACTITIONER

## 2022-08-17 PROCEDURE — 85025 COMPLETE CBC W/AUTO DIFF WBC: CPT | Performed by: NURSE PRACTITIONER

## 2022-08-17 PROCEDURE — 83615 LACTATE (LD) (LDH) ENZYME: CPT | Performed by: INTERNAL MEDICINE

## 2022-08-17 PROCEDURE — 83735 ASSAY OF MAGNESIUM: CPT | Performed by: NURSE PRACTITIONER

## 2022-08-17 PROCEDURE — 86140 C-REACTIVE PROTEIN: CPT | Performed by: INTERNAL MEDICINE

## 2022-08-17 PROCEDURE — 84145 PROCALCITONIN (PCT): CPT | Performed by: INTERNAL MEDICINE

## 2022-08-17 PROCEDURE — 97116 GAIT TRAINING THERAPY: CPT

## 2022-08-17 PROCEDURE — 97530 THERAPEUTIC ACTIVITIES: CPT

## 2022-08-17 PROCEDURE — 84100 ASSAY OF PHOSPHORUS: CPT | Performed by: NURSE PRACTITIONER

## 2022-08-17 PROCEDURE — 97110 THERAPEUTIC EXERCISES: CPT

## 2022-08-17 PROCEDURE — 25010000002 ENOXAPARIN PER 10 MG: Performed by: NURSE PRACTITIONER

## 2022-08-17 PROCEDURE — 82728 ASSAY OF FERRITIN: CPT | Performed by: INTERNAL MEDICINE

## 2022-08-17 PROCEDURE — 97535 SELF CARE MNGMENT TRAINING: CPT

## 2022-08-17 PROCEDURE — 82962 GLUCOSE BLOOD TEST: CPT

## 2022-08-17 RX ADMIN — ENOXAPARIN SODIUM 30 MG: 100 INJECTION SUBCUTANEOUS at 21:13

## 2022-08-17 RX ADMIN — Medication 100 MG: at 09:41

## 2022-08-17 RX ADMIN — PANTOPRAZOLE SODIUM 40 MG: 40 TABLET, DELAYED RELEASE ORAL at 05:39

## 2022-08-17 RX ADMIN — ZINC SULFATE 220 MG (50 MG) CAPSULE 220 MG: CAPSULE at 09:41

## 2022-08-17 RX ADMIN — PREGABALIN 150 MG: 75 CAPSULE ORAL at 21:05

## 2022-08-17 RX ADMIN — PREGABALIN 150 MG: 75 CAPSULE ORAL at 09:41

## 2022-08-17 RX ADMIN — TRAZODONE HYDROCHLORIDE 50 MG: 50 TABLET ORAL at 21:05

## 2022-08-17 RX ADMIN — Medication 10 ML: at 09:41

## 2022-08-17 RX ADMIN — SERTRALINE 100 MG: 100 TABLET, FILM COATED ORAL at 09:41

## 2022-08-17 RX ADMIN — SODIUM CHLORIDE 75 ML/HR: 9 INJECTION, SOLUTION INTRAVENOUS at 05:40

## 2022-08-17 RX ADMIN — Medication 10 ML: at 21:06

## 2022-08-17 RX ADMIN — OXYCODONE HYDROCHLORIDE AND ACETAMINOPHEN 1000 MG: 500 TABLET ORAL at 09:41

## 2022-08-17 RX ADMIN — Medication 5000 UNITS: at 09:41

## 2022-08-17 NOTE — THERAPY TREATMENT NOTE
Subjective: Pt agreeable to therapeutic plan of care.  Cognition: oriented to Person, Place, Time and Situation  Pt answers orientation questions appropriately, but states inconsistencies with PLOF. Pt reports he has not spoken to children since being hospitalized.     Objective:     Bed Mobility: N/A or Not attempted.   Functional Transfers: CGA  Functional Ambulation: N/A or Not attempted.    Lower Body Dressing: Min-A  ADL Position: supported sitting  ADL Comments: chair    Toileting: CGA  ADL Position: BSC  ADL Comments: handheld assist    Vitals: Desaturates, drops to 88% on 3L NC    Pain: 4 VAS  Education: Provided education on importance of mobility and skilled verbal / tactile cueing throughout intervention.     Assessment: Shalom Alexis presents with ADL impairments below baseline abilities which indicate the need for continued skilled intervention while inpatient. Pt completes BUE exercise and sit<>stand transfers. Kyphotic, flexed posture and transfers almost appear more squat-pivot transfers. Toileting with CGA, though pt does not need to complete clothing management at this time. Desaturates to 88% with transfers, on 3L O2. Tolerating session today without incident. Will continue to follow and progress as tolerated.     Plan/Recommendations:   Pt would benefit from Skilled Rehab placement at discharge from facility.   Pt desires Home with family assist at discharge. Pt cooperative; agreeable to therapeutic recommendations and plan of care.     Modified [place]: N/A = No pre-op stroke/TIA    Post-Tx Position: Up in Chair, Alarms activated and Call light and personal items within reach  PPE: gloves, surgical mask, eyewear protection

## 2022-08-17 NOTE — PROGRESS NOTES
[facility] Medicine Services Daily Progress Note    Patient Name: Shalom Alexis  : 1935  MRN: [id number]  Primary Care Physician:  Ron Hensley MD  Date of admission: 2022      Subjective      Chief Complaint: Abdominal pain, diarrhea, feeling bad    2022  Patient Reports no new complaints or acute events.    8/15/2022  The patient reports that his diarrhea is somewhat better.  The patient remains on IV fluids and on oxygen at 3 L/min with a saturation of 94%.    2022  Overall the patient continues to show some improvement.  His diarrhea is abating.  Overall he is starting to feel better.    2022  Patient continues to show improvement day over day.  No new issues.  Diarrhea is improving.    Review of Systems   Constitutional: Negative.   HENT: Negative.    Eyes: Negative.    Cardiovascular: Negative.    Endocrine: Negative.    Hematologic/Lymphatic: Negative.    Skin: Negative.    Musculoskeletal: Negative.    Gastrointestinal: Positive for diarrhea.        His vomiting has resolved and he reports that his issues with nausea and diarrhea have improved.   Genitourinary: Negative.    Neurological: Negative.    Psychiatric/Behavioral: Negative.    Allergic/Immunologic: Negative.    All other systems reviewed and are negative.        Objective      Vitals:   Temp:  [97.7 °F (36.5 °C)-98.4 °F (36.9 °C)] 98.3 °F (36.8 °C)  Heart Rate:  [65-96] 65  Resp:  [14-17] 14  BP: (110-145)/(55-74) 135/55  Flow (L/min):  [3] 3      Vital signs reviewed.  Nursing notes reviewed.  General: well-developed and well-nourished, NAD  HEENT: NC/AT, EOMI, PERRLA, very [place]  Heart: RRR. No murmur, S3 or S4  Chest: CTAB, no w/r/r, normal respiratory effort  Abdominal: Soft. NT/ND. Bowel sounds present.  There is no guarding or rebound and no abdominal tenderness to palpation.  Musculoskeletal: Normal ROM.  No edema. No calf tenderness.  Neurological: AAOx3, no focal deficits  Skin: Skin  is warm and dry. No rash  Psychiatric: Normal mood and affect overall showing improvement.    Result Review    Result Review:  I have personally reviewed the results from the time of this admission to 8/17/2022 17:40 EDT and agree with these findings:  [x]  Laboratory  [x]  Microbiology  [x]  Radiology  [x]  EKG/Telemetry   []  Cardiology/Vascular   []  Pathology  []  Old records  []  Other:   Most notable findings include    Wounds (last 24 hours)     LDA Wound     Row Name 08/16/22 2010             Wound 08/13/22 1709 Left gluteal Pressure Injury    Wound - Properties Group Placement Date: 08/13/22  -LS Placement Time: 1709  -LS Present on Hospital Admission: --  -LS, unknown  Side: Left  -LS Location: gluteal  -LS Primary Wound Type: Pressure inj  -LS Additional Comments: stage 2 pressure injury  -LS      Pressure Injury Stage 2  -LH      Dressing Appearance open to air  -LH      Closure CHRISSY  -LH      Dressing Care open to air  -LH      Retired Wound - Properties Group Placement Date: 08/13/22  -LS Placement Time: 1709  -LS Present on Hospital Admission: --  -LS, unknown  Side: Left  -LS Location: gluteal  -LS Primary Wound Type: Pressure inj  -LS Additional Comments: stage 2 pressure injury  -LS      Retired Wound - Properties Group Date first assessed: 08/13/22  -LS Time first assessed: 1709  -LS Present on Hospital Admission: --  -LS, unknown  Side: Left  -LS Location: gluteal  -LS Primary Wound Type: Pressure inj  -LS Additional Comments: stage 2 pressure injury  -LS              Wound 08/14/22 2100 Right anterior foot Abrasion    Wound - Properties Group Placement Date: 08/14/22  -LM Placement Time: 2100  -LM Side: Right  -LM Orientation: anterior  -LM Location: foot  -LM Primary Wound Type: Abrasion  -LM Additional Comments: Pt states that he has had this wound for a while  -LM      Dressing Appearance dry;intact  -LH      Retired Wound - Properties Group Placement Date: 08/14/22  -LM Placement Time: 2100   -LM Side: Right  -LM Orientation: anterior  -LM Location: foot  -LM Primary Wound Type: Abrasion  -LM Additional Comments: Pt states that he has had this wound for a while  -LM      Retired Wound - Properties Group Date first assessed: 08/14/22  -LM Time first assessed: 2100  -LM Side: Right  -LM Location: foot  -LM Primary Wound Type: Abrasion  -LM Additional Comments: Pt states that he has had this wound for a while  -LM            User Key  (r) = Recorded By, (t) = Taken By, (c) = Cosigned By    Initials Name Provider Type    LS Arelis Gómez, RN Registered Nurse     Ester Mauro, RN Registered Nurse    LM Lori Mcdowell, RN Registered Nurse                 COVID-19 LAB PANEL     COVID-19 test result  COVID19   Date Value Ref Range Status   08/09/2022 Detected (C) Not Detected - Ref. Range Final       COVID-19 Prognostic lab results  WBC with differential  Results from last 7 days   Lab Units 08/17/22  0014 08/16/22  0005 08/15/22  0148 08/14/22  0237 08/13/22  0421 08/12/22  0857 08/11/22  0459   WBC 10*3/mm3 6.60 7.00 7.70 7.30 7.40 8.40 6.40   PLATELETS 10*3/mm3 163 162 210 161 178 173 164   NEUTROPHIL % % 61.2 52.5 56.9 66.3 66.4 68.4 64.2   LYMPHOCYTE % % 23.8 31.2 26.9 20.4 19.3* 19.6 21.3   NEUTROS ABS 10*3/mm3 4.00 3.70 4.40 4.80 4.90 5.80 4.10   LYMPHS ABS 10*3/mm3 1.60 2.20 2.10 1.50 1.40 1.60 1.40     Inflammatory markers  Results from last 7 days   Lab Units 08/17/22  0014 08/16/22  0005 08/15/22  0542 08/14/22  0237 08/13/22  0421 08/12/22  0857 08/11/22  0459   CRP mg/dL 1.85* 2.70* 4.19* 3.12* 1.68* 1.19* 0.95*   FERRITIN ng/mL 904.70* 975.50* 924.40* 927.90* 912.10* 926.40* 898.10*   PROCALCITONIN ng/mL 0.19 0.18 0.22 0.15 0.12 0.10 0.11   LDH U/L 183 175 167 168 194 215 187   ALK PHOS U/L 70 69 64 67 68 69 63   AST (SGOT) U/L 38 38 39 36 40 41* 38   ALT (SGPT) U/L 31 32 31 31 33 31 30       Poor COVID-19 outcomes associated with:  Neutrophil:Lymphocyte ratio >3.5  Thrombocytopenia,  "lymphopenia  LFT's >5x upper limit of normal  LDH >400      Assessment & Plan      Brief Patient Summary:  Shalom Alexis is a 87 y.o. male who tested positive for COVID-19 and flu B at [facility] and [facility].  He presented with complaints of abdominal pain, diarrhea, decreased oral intake, and \"feeling bad all over.\"  Apparently the diarrhea has been going on for over 2 weeks.  He was found to have acute renal failure with BUN of 101 and creatinine 5.3 potassium 5.1.  He was also reportedly hypoxic requiring 2 L O2.        ascorbic acid, 1,000 mg, Oral, Daily  enoxaparin, 30 mg, Subcutaneous, Q24H  insulin lispro, 0-14 Units, Subcutaneous, 4x Daily With Meals & Nightly  pantoprazole, 40 mg, Oral, Q AM  pregabalin, 150 mg, Oral, TID  sertraline, 100 mg, Oral, Daily  sodium chloride, 10 mL, Intravenous, Q12H  thiamine, 100 mg, Oral, Daily  traZODone, 50 mg, Oral, Nightly  cholecalciferol, 5,000 Units, Oral, Daily  zinc sulfate, 220 mg, Oral, Daily       sodium chloride, 75 mL/hr, Last Rate: 75 mL/hr (08/17/22 0540)         Active Hospital Problems:  Active Hospital Problems    Diagnosis    • **Acute renal failure (ARF) (HCC)    • Acute hypoxemic respiratory failure due to COVID-19 (HCC)    • Diarrhea of presumed infectious origin    • Elevated troponin    • CAD (coronary artery disease)    • History of MI (myocardial infarction)    • [place] (hard of hearing)    • Hematochezia    • Metabolic acidosis      Plan:   Acute hypoxemic respirato  Acute hypoxemic respiratory failure due to Covid-19  -- Too late for Remdesivir  -- Dexamethasone x10 days  -- Supplemental O2 as needed  -- Monitor lab trends  -- Pulmonology following  -Patient showing some improvement       -Hopefully home in a.m.    Acute renal failure  Metabolic acidosis  --Hold ACE inhibitor, Lasix  -- Nephrology following     Diarrhea - improving  --GI panel negative  --C. difficile negative  --Maintain hydration and " electrolyte     Elevated troponin  -- ?  Demand ischemia vs kidney disease     DM Type II with hyperglycemia  -- glimepiride and januvia held on admission  -- [employer] AC/HS  -- SSI coverage as needed  -- check HgbA1c     GERD  --continue pantoprazole     Depression  --continue sertraline      Hard of hearing     DVT prophylaxis:  Medical and mechanical DVT prophylaxis orders are present.    CODE STATUS:    Code Status (Patient has no pulse and is not breathing): CPR (Attempt to Resuscitate)  Medical Interventions (Patient has pulse or is breathing): Full Support      Disposition:  I expect patient to be discharged pending clinical course and consultant recommendations.    This patient has been examined wearing appropriate Personal Protective Equipment. 08/17/22      Electronically signed by Amanda Keita MD, 08/17/22, 17:40 EDT.  [facility] Hospitalist Team

## 2022-08-17 NOTE — PLAN OF CARE
Goal Outcome Evaluation:      Pt rested well during the night.  Pt denies any pain or discomfort and no s/s of pain noted.   Will continue to monitor pt status.

## 2022-08-17 NOTE — PLAN OF CARE
Shalom Alexis presents with ADL impairments below baseline abilities which indicate the need for continued skilled intervention while inpatient. Pt completes BUE exercise and sit<>stand transfers. Kyphotic, flexed posture and transfers almost appear more squat-pivot transfers. Toileting with CGA, though pt does not need to complete clothing management at this time. Desaturates to 88% with transfers, on 3L O2. Tolerating session today without incident. Will continue to follow and progress as tolerated.

## 2022-08-17 NOTE — PLAN OF CARE
"Shalom Alexis presents with functional mobility impairments which indicate the need for skilled intervention. Tolerating session today without incident. Pt exhibits gait impairments that place him at a high risk for falls. Recommend skilled rehab to address functional deficits prior to returning home. Will continue to follow and progress as tolerated.     Plan/Recommendations:   Moderate Intensity Therapy recommended post-acute care. This is recommended as therapy feels the patient would require 3-4 days per week and wouldn't tolerate \"3 hour daily\" rehab intensity. SNF would be the preferred choice. If the patient does not agree to SNF, arrange HH or OP depending on home bound status. If patient is medically complex, consider LTACH.. Pt requires no DME at discharge.     Pt desires Home with family assist and and Home Health at discharge. Pt cooperative; agreeable to therapeutic recommendations and plan of care.   "

## 2022-08-17 NOTE — PLAN OF CARE
Goal Outcome Evaluation:      Patient was admitted with acute renal failure and Covid. Patient is requiring oxygen at this time,will need 6 min walk before discharge. Patient will return home with home health.

## 2022-08-17 NOTE — THERAPY TREATMENT NOTE
"Subjective: Pt agreeable to therapeutic plan of care. Pt up in chair upon entering room. When asked about rehab, he states \"rehab won't help me in my condition now, I'm too far gone.\"    Objective:     Bed mobility - N/A or Not attempted.     Transfers - 2x STS CGA and with rolling walker     Ambulation - 2x25 feet CGA and with rolling walker. L LE in ER, forward flexed posture, decreased yoly, additional time required to make turns, and two notable episodes of knee buckling while amb.     Vitals: Desaturates. Pt declined to use supplemental O2 for amb. SAO2 levels at 85% s/p amb with recovery after 5 min sitting rest period and PLB.     Pain: 0 VAS  Education: Provided education on importance of mobility and skilled verbal / tactile cueing throughout intervention.     Assessment: Shalom Alexis presents with functional mobility impairments which indicate the need for skilled intervention. Tolerating session today without incident. Pt exhibits gait impairments that place him at a high risk for falls. Recommend skilled rehab to address functional deficits prior to returning home. Will continue to follow and progress as tolerated.     Plan/Recommendations:   Moderate Intensity Therapy recommended post-acute care. This is recommended as therapy feels the patient would require 3-4 days per week and wouldn't tolerate \"3 hour daily\" rehab intensity. SNF would be the preferred choice. If the patient does not agree to SNF, arrange HH or OP depending on home bound status. If patient is medically complex, consider LTACH.. Pt requires no DME at discharge.     Pt desires Home with family assist and and Home Health at discharge. Pt cooperative; agreeable to therapeutic recommendations and plan of care.         Basic Mobility 6-click:  Rollin = Total, A lot = 2, A little = 3; 4 = None  Supine>Sit:   1 = Total, A lot = 2, A little = 3; 4 = None   Sit>Stand with arms:  1 = Total, A lot = 2, A little = 3; 4 = " None  Bed>Chair:   1 = Total, A lot = 2, A little = 3; 4 = None  Ambulate in room:  1 = Total, A lot = 2, A little = 3; 4 = None  3-5 Steps with railin = Total, A lot = 2, A little = 3; 4 = None  Score: 17    Modified [place]: N/A = No pre-op stroke/TIA    Post-Tx Position: Up in Chair, Alarms activated and Call light and personal items within reach  PPE: gloves, surgical mask, eyewear protection

## 2022-08-17 NOTE — PROGRESS NOTES
"                                                                                                                                      Nephrology  Progress Note                                        [facility]    Patient Identification    Name: Shalom Alexis  Age: 87 y.o.  Sex: male  :  1935  MRN: [id number]      DATE OF SERVICE:  2022        Subective    Resting on oxygen sitting up in chair  Objective   Scheduled Meds:ascorbic acid, 1,000 mg, Oral, Daily  enoxaparin, 30 mg, Subcutaneous, Q24H  insulin lispro, 0-14 Units, Subcutaneous, 4x Daily With Meals & Nightly  pantoprazole, 40 mg, Oral, Q AM  pregabalin, 150 mg, Oral, TID  sertraline, 100 mg, Oral, Daily  sodium chloride, 10 mL, Intravenous, Q12H  thiamine, 100 mg, Oral, Daily  traZODone, 50 mg, Oral, Nightly  cholecalciferol, 5,000 Units, Oral, Daily  zinc sulfate, 220 mg, Oral, Daily          Continuous Infusions:sodium chloride, 75 mL/hr, Last Rate: 75 mL/hr (22 0540)        PRN Meds:•  acetaminophen **OR** acetaminophen  •  albuterol sulfate HFA  •  aluminum-magnesium hydroxide-simethicone  •  dextrose  •  dextrose  •  glucagon (human recombinant)  •  HYDROcodone-acetaminophen  •  nitroglycerin  •  ondansetron **OR** ondansetron  •  sodium chloride     Exam:  /74 (BP Location: Left arm, Patient Position: Lying)   Pulse 95   Temp 98.1 °F (36.7 °C) (Oral)   Resp 15   Ht 185.4 cm (73\")   Wt 93.5 kg (206 lb 3.2 oz)   SpO2 97%   BMI 27.20 kg/m²     Intake/Output last 3 shifts:  I/O last 3 completed shifts:  In: 6 [P.O.:1796]  Out: 300 [Urine:300]    Intake/Output this shift:  I/O this shift:  In: 1250 [P.O.:250; I.V.:1000]  Out: -     Physical exam:  General Appearance:  no distress   ithout obvious abnormality, atraumatic  Eyes:  PERRL, conjunctiva/corneas clear     Neck:  Supple,  no adenopathy;      Lungs:  Decreased BS occasion ronchi  Heart:  Regular rate and rhythm, S1 and S2 normal  Abdomen:  Soft, " non-tender, bowel sounds active   Extremities: trace edema  Pulses: 2+ and symmetric all extremities  Skin:  No rashes or lesions       Data Review:  All labs (24hrs):   Recent Results (from the past 24 hour(s))   POC Glucose Once    Collection Time: 08/16/22  7:47 AM    Specimen: Blood   Result Value Ref Range    Glucose 94 70 - 105 mg/dL   POC Glucose Once    Collection Time: 08/16/22 10:59 AM    Specimen: Blood   Result Value Ref Range    Glucose 148 (H) 70 - 105 mg/dL   POC Glucose Once    Collection Time: 08/16/22  4:45 PM    Specimen: Blood   Result Value Ref Range    Glucose 145 (H) 70 - 105 mg/dL   Magnesium    Collection Time: 08/17/22 12:14 AM    Specimen: Blood   Result Value Ref Range    Magnesium 1.9 1.6 - 2.4 mg/dL   Phosphorus    Collection Time: 08/17/22 12:14 AM    Specimen: Blood   Result Value Ref Range    Phosphorus 3.2 2.5 - 4.5 mg/dL   Comprehensive Metabolic Panel    Collection Time: 08/17/22 12:14 AM    Specimen: Blood   Result Value Ref Range    Glucose 160 (H) 65 - 99 mg/dL    BUN 52 (H) 8 - 23 mg/dL    Creatinine 2.75 (H) 0.76 - 1.27 mg/dL    Sodium 140 136 - 145 mmol/L    Potassium 4.4 3.5 - 5.2 mmol/L    Chloride 111 (H) 98 - 107 mmol/L    CO2 18.0 (L) 22.0 - 29.0 mmol/L    Calcium 8.3 (L) 8.6 - 10.5 mg/dL    Total Protein 5.5 (L) 6.0 - 8.5 g/dL    Albumin 3.00 (L) 3.50 - 5.20 g/dL    ALT (SGPT) 31 1 - 41 U/L    AST (SGOT) 38 1 - 40 U/L    Alkaline Phosphatase 70 39 - 117 U/L    Total Bilirubin 0.2 0.0 - 1.2 mg/dL    Globulin 2.5 gm/dL    A/G Ratio 1.2 g/dL    BUN/Creatinine Ratio 18.9 7.0 - 25.0    Anion Gap 11.0 5.0 - 15.0 mmol/L    eGFR 21.9 (L) >60.0 mL/min/1.73   C-reactive Protein    Collection Time: 08/17/22 12:14 AM    Specimen: Blood   Result Value Ref Range    C-Reactive Protein 1.85 (H) 0.00 - 0.50 mg/dL   Procalcitonin    Collection Time: 08/17/22 12:14 AM    Specimen: Blood   Result Value Ref Range    Procalcitonin 0.19 0.00 - 0.25 ng/mL   Ferritin    Collection Time:  08/17/22 12:14 AM    Specimen: Blood   Result Value Ref Range    Ferritin 904.70 (H) 30.00 - 400.00 ng/mL   Lactate Dehydrogenase    Collection Time: 08/17/22 12:14 AM    Specimen: Blood   Result Value Ref Range     135 - 225 U/L   CBC Auto Differential    Collection Time: 08/17/22 12:14 AM    Specimen: Blood   Result Value Ref Range    WBC 6.60 3.40 - 10.80 10*3/mm3    RBC 3.42 (L) 4.14 - 5.80 10*6/mm3    Hemoglobin 10.3 (L) 13.0 - 17.7 g/dL    Hematocrit 31.0 (L) 37.5 - 51.0 %    MCV 90.4 79.0 - 97.0 fL    MCH 30.0 26.6 - 33.0 pg    MCHC 33.2 31.5 - 35.7 g/dL    RDW 14.6 12.3 - 15.4 %    RDW-SD 46.4 37.0 - 54.0 fl    MPV 10.5 6.0 - 12.0 fL    Platelets 163 140 - 450 10*3/mm3    Neutrophil % 61.2 42.7 - 76.0 %    Lymphocyte % 23.8 19.6 - 45.3 %    Monocyte % 11.2 5.0 - 12.0 %    Eosinophil % 3.5 0.3 - 6.2 %    Basophil % 0.3 0.0 - 1.5 %    Neutrophils, Absolute 4.00 1.70 - 7.00 10*3/mm3    Lymphocytes, Absolute 1.60 0.70 - 3.10 10*3/mm3    Monocytes, Absolute 0.70 0.10 - 0.90 10*3/mm3    Eosinophils, Absolute 0.20 0.00 - 0.40 10*3/mm3    Basophils, Absolute 0.00 0.00 - 0.20 10*3/mm3    nRBC 0.0 0.0 - 0.2 /100 WBC          Imaging:  XR Chest 1 View    Result Date: 8/15/2022  Improved aeration of the right lung base compared 08/09/2022 chest x-ray. No acute cardiopulmonary abnormality is identified.  Electronically Signed By-Lori Donato MD On:8/15/2022 1:42 PM This report was finalized on [id number] by  Lori Donato MD.      Assessment/Plan:     Acute renal failure (ARF) (HCC)    Acute hypoxemic respiratory failure due to COVID-19 (HCC)    Diarrhea of presumed infectious origin    Hematochezia    Metabolic acidosis    CAD (coronary artery disease)    History of MI (myocardial infarction)    [place] (hard of hearing)    Elevated troponin     Acute kidney injury  Metabolic acidosis  Hyperkalemia  COVID-19 pneumonia  Elevated troponin  Hyperglycemia  Hard of hearing      Creatinine up to 2.7 from 2.2-  2.4  Acidosis improving   on half-normal saline at 75  the rise in the creatinine does not seem to be volume   Check urine studies

## 2022-08-17 NOTE — PROGRESS NOTES
Daily Progress Note        Acute renal failure (ARF) (HCC)    Acute hypoxemic respiratory failure due to COVID-19 (HCC)    Diarrhea of presumed infectious origin    Hematochezia    Metabolic acidosis    CAD (coronary artery disease)    History of MI (myocardial infarction)    [place] (hard of hearing)    Elevated troponin      Assessment  Acute hypoxic respiratory failure due to COVID-19-tested + 8/9/2022  Acute renal failure  Diarrhea- improving  Elevated troponin  COPD  Type 2 diabetes  GERD  Depression  Hard of hearing  Former smoker    MRSA PCR + 8/9/2022        Plan    Currently oxygenating well on room air, was on 3L NC. Continue to monitor O2 saturation.  Proventil  Vitamin C, D, zinc  Electrolyte/Glycemic control  DVT/GI Prophylaxis-Lovenox and Protonix  Normal saline IV infusion at 75 mL/HR  PT/OT following- requests post acute care/LTACH    Discharge plan is to go home with family and home health         COVID-19 LAB PANEL     COVID-19 test result  COVID19   Date Value Ref Range Status   08/09/2022 Detected (C) Not Detected - Ref. Range Final       COVID-19 Prognostic lab results  WBC with differential  Results from last 7 days   Lab Units 08/17/22  0014 08/16/22  0005 08/15/22  0148 08/14/22  0237 08/13/22  0421 08/12/22  0857 08/11/22  0459   WBC 10*3/mm3 6.60 7.00 7.70 7.30 7.40 8.40 6.40   PLATELETS 10*3/mm3 163 162 210 161 178 173 164   NEUTROPHIL % % 61.2 52.5 56.9 66.3 66.4 68.4 64.2   LYMPHOCYTE % % 23.8 31.2 26.9 20.4 19.3* 19.6 21.3   NEUTROS ABS 10*3/mm3 4.00 3.70 4.40 4.80 4.90 5.80 4.10   LYMPHS ABS 10*3/mm3 1.60 2.20 2.10 1.50 1.40 1.60 1.40     Inflammatory markers  Results from last 7 days   Lab Units 08/17/22  0014 08/16/22  0005 08/15/22  0542 08/14/22  0237 08/13/22  0421 08/12/22  0857 08/11/22  0459 08/10/22  1104   CRP mg/dL 1.85* 2.70* 4.19* 3.12* 1.68* 1.19* 0.95*  --    FERRITIN ng/mL 904.70* 975.50* 924.40* 927.90* 912.10* 926.40* 898.10*  --    PROCALCITONIN ng/mL 0.19 0.18 0.22 0.15  0.12 0.10 0.11  --    LDH U/L 183 175 167 168 194 215 187  --    ALK PHOS U/L 70 69 64 67 68 69 63  --    AST (SGOT) U/L 38 38 39 36 40 41* 38  --    ALT (SGPT) U/L 31 32 31 31 33 31 30  --    TROPONIN T ng/mL  --   --   --   --   --   --   --  0.067*       Poor COVID-19 outcomes associated with:  Neutrophil:Lymphocyte ratio >3.5  Thrombocytopenia, lymphopenia  LFT's >5x upper limit of normal  LDH >400   LOS: 8 days     Subjective         Objective     Vital signs for last 24 hours:  Vitals:    08/16/22 1500 08/16/22 2033 08/16/22 2326 08/17/22 0401   BP: 151/76 145/74 110/69 122/74   BP Location: Left arm Left arm Left arm Left arm   Patient Position: Lying Lying Lying Lying   Pulse: 73  96 95   Resp: 18 15 17 15   Temp: 97.6 °F (36.4 °C) 97.7 °F (36.5 °C) 98.4 °F (36.9 °C) 98.1 °F (36.7 °C)   TempSrc: Oral Oral Oral Oral   SpO2: 97% 96% (!) 83% 97%   Weight:    93.5 kg (206 lb 3.2 oz)   Height:           Intake/Output last 3 shifts:  I/O last 3 completed shifts:  In: 1796 [P.O.:1796]  Out: 300 [Urine:300]  Intake/Output this shift:  I/O this shift:  In: 1250 [P.O.:250; I.V.:1000]  Out: -       Radiology  Imaging Results (Last 24 Hours)       ** No results found for the last 24 hours. **            Labs:  Results from last 7 days   Lab Units 08/17/22  0014   WBC 10*3/mm3 6.60   HEMOGLOBIN g/dL 10.3*   HEMATOCRIT % 31.0*   PLATELETS 10*3/mm3 163     Results from last 7 days   Lab Units 08/17/22  0014   SODIUM mmol/L 140   POTASSIUM mmol/L 4.4   CHLORIDE mmol/L 111*   CO2 mmol/L 18.0*   BUN mg/dL 52*   CREATININE mg/dL 2.75*   CALCIUM mg/dL 8.3*   BILIRUBIN mg/dL 0.2   ALK PHOS U/L 70   ALT (SGPT) U/L 31   AST (SGOT) U/L 38   GLUCOSE mg/dL 160*         Results from last 7 days   Lab Units 08/17/22  0014 08/16/22  0005 08/15/22  0542   ALBUMIN g/dL 3.00* 3.10* 3.20*     Results from last 7 days   Lab Units 08/10/22  1104   TROPONIN T ng/mL 0.067*         Results from last 7 days   Lab Units 08/17/22  0014   MAGNESIUM  mg/dL 1.9                   Meds:   SCHEDULE  ascorbic acid, 1,000 mg, Oral, Daily  enoxaparin, 30 mg, Subcutaneous, Q24H  insulin lispro, 0-14 Units, Subcutaneous, 4x Daily With Meals & Nightly  pantoprazole, 40 mg, Oral, Q AM  pregabalin, 150 mg, Oral, TID  sertraline, 100 mg, Oral, Daily  sodium chloride, 10 mL, Intravenous, Q12H  thiamine, 100 mg, Oral, Daily  traZODone, 50 mg, Oral, Nightly  cholecalciferol, 5,000 Units, Oral, Daily  zinc sulfate, 220 mg, Oral, Daily      Infusions  sodium chloride, 75 mL/hr, Last Rate: 75 mL/hr (08/17/22 0540)      PRNs    acetaminophen **OR** acetaminophen    albuterol sulfate HFA    aluminum-magnesium hydroxide-simethicone    dextrose    dextrose    glucagon (human recombinant)    HYDROcodone-acetaminophen    nitroglycerin    ondansetron **OR** ondansetron    sodium chloride    Physical Exam:  Physical Exam  General Appearance:  Alert. No distress noted.   HEENT:  Normocephalic, without obvious abnormality, Conjunctiva/corneas clear,.  Normal external ear canals, Nares normal, no drainage     Neck:  Supple, symmetrical, trachea midline. No JVD.  Lungs /Chest wall: Few basal rales bilaterally respirations unlabored symmetrical wall movement.     Heart:  Regular rate and rhythm, systolic murmur PMI left sternal border  Abdomen: Soft, non-tender, no masses, no organomegaly.    Extremities: No edema, no clubbing or cyanosis      ROS  Review of Systems  Constitutional: Negative for chills, fever and malaise/fatigue.   HENT: Negative.    Eyes: Negative.    Cardiovascular: Negative.    Respiratory: Positive for cough and shortness of breath, improving.    Skin: Negative.    Musculoskeletal: Negative.    Gastrointestinal: Negative.    Genitourinary: Negative.    Neurological: Negative.    Psychiatric/Behavioral: Negative.      I have reviewed current clinicals.     Electronically signed by JORDYN Ring, 08/17/22, 6:37 AM EDT.     The NP scribed the note for me, I have  examined the patient and reviewed and verified the above findings and and I formulated the assessment and plan as documented

## 2022-08-18 LAB
ALBUMIN SERPL-MCNC: 3.4 G/DL (ref 3.5–5.2)
ALBUMIN/GLOB SERPL: 1.3 G/DL
ALP SERPL-CCNC: 83 U/L (ref 39–117)
ALT SERPL W P-5'-P-CCNC: 33 U/L (ref 1–41)
ANION GAP SERPL CALCULATED.3IONS-SCNC: 9 MMOL/L (ref 5–15)
AST SERPL-CCNC: 39 U/L (ref 1–40)
BACTERIA UR QL AUTO: NORMAL /HPF
BILIRUB SERPL-MCNC: 0.3 MG/DL (ref 0–1.2)
BILIRUB UR QL STRIP: NEGATIVE
BUN SERPL-MCNC: 48 MG/DL (ref 8–23)
BUN/CREAT SERPL: 17.5 (ref 7–25)
CALCIUM SPEC-SCNC: 8.7 MG/DL (ref 8.6–10.5)
CHLORIDE SERPL-SCNC: 111 MMOL/L (ref 98–107)
CLARITY UR: CLEAR
CO2 SERPL-SCNC: 19 MMOL/L (ref 22–29)
COLOR UR: YELLOW
CREAT SERPL-MCNC: 2.74 MG/DL (ref 0.76–1.27)
CRP SERPL-MCNC: 2.1 MG/DL (ref 0–0.5)
EGFRCR SERPLBLD CKD-EPI 2021: 22 ML/MIN/1.73
FERRITIN SERPL-MCNC: 955.9 NG/ML (ref 30–400)
GLOBULIN UR ELPH-MCNC: 2.7 GM/DL
GLUCOSE BLDC GLUCOMTR-MCNC: 107 MG/DL (ref 70–105)
GLUCOSE BLDC GLUCOMTR-MCNC: 112 MG/DL (ref 70–105)
GLUCOSE BLDC GLUCOMTR-MCNC: 148 MG/DL (ref 70–105)
GLUCOSE BLDC GLUCOMTR-MCNC: 160 MG/DL (ref 70–105)
GLUCOSE SERPL-MCNC: 160 MG/DL (ref 65–99)
GLUCOSE UR STRIP-MCNC: ABNORMAL MG/DL
HGB UR QL STRIP.AUTO: NEGATIVE
HYALINE CASTS UR QL AUTO: NORMAL /LPF
KETONES UR QL STRIP: NEGATIVE
LDH SERPL-CCNC: 244 U/L (ref 135–225)
LEUKOCYTE ESTERASE UR QL STRIP.AUTO: NEGATIVE
MAGNESIUM SERPL-MCNC: 1.8 MG/DL (ref 1.6–2.4)
NITRITE UR QL STRIP: NEGATIVE
PH UR STRIP.AUTO: <=5 [PH] (ref 5–8)
PHOSPHATE SERPL-MCNC: 2.2 MG/DL (ref 2.5–4.5)
POTASSIUM SERPL-SCNC: 4.9 MMOL/L (ref 3.5–5.2)
PROCALCITONIN SERPL-MCNC: 0.19 NG/ML (ref 0–0.25)
PROT ?TM UR-MCNC: 72.4 MG/DL
PROT SERPL-MCNC: 6.1 G/DL (ref 6–8.5)
PROT UR QL STRIP: ABNORMAL
RBC # UR STRIP: NORMAL /HPF
REF LAB TEST METHOD: NORMAL
SODIUM SERPL-SCNC: 139 MMOL/L (ref 136–145)
SP GR UR STRIP: 1.01 (ref 1–1.03)
SQUAMOUS #/AREA URNS HPF: NORMAL /HPF
UROBILINOGEN UR QL STRIP: ABNORMAL
WBC # UR STRIP: NORMAL /HPF

## 2022-08-18 PROCEDURE — 97535 SELF CARE MNGMENT TRAINING: CPT

## 2022-08-18 PROCEDURE — 97110 THERAPEUTIC EXERCISES: CPT

## 2022-08-18 PROCEDURE — 25010000002 ENOXAPARIN PER 10 MG: Performed by: NURSE PRACTITIONER

## 2022-08-18 PROCEDURE — 97112 NEUROMUSCULAR REEDUCATION: CPT

## 2022-08-18 PROCEDURE — 84156 ASSAY OF PROTEIN URINE: CPT | Performed by: INTERNAL MEDICINE

## 2022-08-18 PROCEDURE — 82962 GLUCOSE BLOOD TEST: CPT

## 2022-08-18 PROCEDURE — 63710000001 INSULIN LISPRO (HUMAN) PER 5 UNITS: Performed by: INTERNAL MEDICINE

## 2022-08-18 PROCEDURE — 94618 PULMONARY STRESS TESTING: CPT

## 2022-08-18 PROCEDURE — 81001 URINALYSIS AUTO W/SCOPE: CPT | Performed by: INTERNAL MEDICINE

## 2022-08-18 PROCEDURE — 97116 GAIT TRAINING THERAPY: CPT

## 2022-08-18 PROCEDURE — 99232 SBSQ HOSP IP/OBS MODERATE 35: CPT | Performed by: INTERNAL MEDICINE

## 2022-08-18 RX ADMIN — INSULIN LISPRO 3 UNITS: 100 INJECTION, SOLUTION INTRAVENOUS; SUBCUTANEOUS at 17:22

## 2022-08-18 RX ADMIN — PANTOPRAZOLE SODIUM 40 MG: 40 TABLET, DELAYED RELEASE ORAL at 05:30

## 2022-08-18 RX ADMIN — Medication 10 ML: at 08:50

## 2022-08-18 RX ADMIN — PREGABALIN 150 MG: 75 CAPSULE ORAL at 16:54

## 2022-08-18 RX ADMIN — PREGABALIN 150 MG: 75 CAPSULE ORAL at 21:16

## 2022-08-18 RX ADMIN — OXYCODONE HYDROCHLORIDE AND ACETAMINOPHEN 1000 MG: 500 TABLET ORAL at 08:52

## 2022-08-18 RX ADMIN — Medication 5000 UNITS: at 08:52

## 2022-08-18 RX ADMIN — SODIUM CHLORIDE 75 ML/HR: 9 INJECTION, SOLUTION INTRAVENOUS at 08:52

## 2022-08-18 RX ADMIN — ZINC SULFATE 220 MG (50 MG) CAPSULE 220 MG: CAPSULE at 08:52

## 2022-08-18 RX ADMIN — SODIUM CHLORIDE 75 ML/HR: 9 INJECTION, SOLUTION INTRAVENOUS at 21:19

## 2022-08-18 RX ADMIN — ENOXAPARIN SODIUM 30 MG: 100 INJECTION SUBCUTANEOUS at 16:54

## 2022-08-18 RX ADMIN — PREGABALIN 150 MG: 75 CAPSULE ORAL at 08:52

## 2022-08-18 RX ADMIN — SERTRALINE 100 MG: 100 TABLET, FILM COATED ORAL at 08:52

## 2022-08-18 RX ADMIN — Medication 10 ML: at 21:16

## 2022-08-18 RX ADMIN — TRAZODONE HYDROCHLORIDE 50 MG: 50 TABLET ORAL at 21:16

## 2022-08-18 RX ADMIN — Medication 100 MG: at 08:52

## 2022-08-18 NOTE — PROGRESS NOTES
Daily Progress Note        Acute renal failure (ARF) (HCC)    Acute hypoxemic respiratory failure due to COVID-19 (HCC)    Diarrhea of presumed infectious origin    Hematochezia    Metabolic acidosis    CAD (coronary artery disease)    History of MI (myocardial infarction)    [place] (hard of hearing)    Elevated troponin      Assessment  Acute hypoxic respiratory failure due to COVID-19-tested + 8/9/2022  Acute renal failure  Diarrhea- improving  Elevated troponin  COPD  Type 2 diabetes  GERD  Depression  Hard of hearing  Former smoker    MRSA PCR + 8/9/2022        Plan  May discharge from pulmonary standpoint, but if does not discharge will continue to monitor for  other complications.  Follow-up in our office in 2 weeks.    6 min walk prior to d'c    Continue to titrate oxygen- Currently requiring 2L NC, was 3L.   Proventil  Vitamin C, D, zinc  Electrolyte/Glycemic control  DVT/GI Prophylaxis-Lovenox and Protonix  Normal saline IV infusion at 75 mL/HR  PT/OT following- requests post acute care/LTACH    Discharge plan is to go home with family and home health         COVID-19 LAB PANEL     COVID-19 test result  COVID19   Date Value Ref Range Status   08/09/2022 Detected (C) Not Detected - Ref. Range Final       COVID-19 Prognostic lab results  WBC with differential  Results from last 7 days   Lab Units 08/17/22  2303 08/17/22  0014 08/16/22  0005 08/15/22  0148 08/14/22  0237 08/13/22  0421 08/12/22  0857   WBC 10*3/mm3 8.70 6.60 7.00 7.70 7.30 7.40 8.40   PLATELETS 10*3/mm3 189 163 162 210 161 178 173   NEUTROPHIL % % 64.5 61.2 52.5 56.9 66.3 66.4 68.4   LYMPHOCYTE % % 20.7 23.8 31.2 26.9 20.4 19.3* 19.6   NEUTROS ABS 10*3/mm3 5.60 4.00 3.70 4.40 4.80 4.90 5.80   LYMPHS ABS 10*3/mm3 1.80 1.60 2.20 2.10 1.50 1.40 1.60     Inflammatory markers  Results from last 7 days   Lab Units 08/17/22  2303 08/17/22  0014 08/16/22  0005 08/15/22  0542 08/14/22  0237 08/13/22  0421 08/12/22  0857   CRP mg/dL 2.10* 1.85* 2.70* 4.19*  3.12* 1.68* 1.19*   FERRITIN ng/mL 955.90* 904.70* 975.50* 924.40* 927.90* 912.10* 926.40*   PROCALCITONIN ng/mL 0.19 0.19 0.18 0.22 0.15 0.12 0.10   LDH U/L 244* 183 175 167 168 194 215   ALK PHOS U/L 83 70 69 64 67 68 69   AST (SGOT) U/L 39 38 38 39 36 40 41*   ALT (SGPT) U/L 33 31 32 31 31 33 31       Poor COVID-19 outcomes associated with:  Neutrophil:Lymphocyte ratio >3.5  Thrombocytopenia, lymphopenia  LFT's >5x upper limit of normal  LDH >400   LOS: 9 days     Subjective         Objective     Vital signs for last 24 hours:  Vitals:    08/17/22 1223 08/17/22 1946 08/18/22 0405 08/18/22 0815   BP: 135/55 158/83 129/71 139/79   BP Location: Left arm Left arm Left arm Left arm   Patient Position: Sitting Lying Lying Lying   Pulse: 65 80 88 81   Resp: 14 16 19 17   Temp: 98.3 °F (36.8 °C) 97.8 °F (36.6 °C) 96.8 °F (36 °C) 97 °F (36.1 °C)   TempSrc: Oral Oral Oral Oral   SpO2: 93% 99% 95% 98%   Weight:   95.5 kg (210 lb 8.6 oz)    Height:           Intake/Output last 3 shifts:  I/O last 3 completed shifts:  In: 4870 [P.O.:591; I.V.:4279]  Out: 950 [Urine:950]  Intake/Output this shift:  No intake/output data recorded.      Radiology  Imaging Results (Last 24 Hours)       ** No results found for the last 24 hours. **            Labs:  Results from last 7 days   Lab Units 08/17/22  2303   WBC 10*3/mm3 8.70   HEMOGLOBIN g/dL 11.3*   HEMATOCRIT % 33.9*   PLATELETS 10*3/mm3 189     Results from last 7 days   Lab Units 08/17/22  2303   SODIUM mmol/L 139   POTASSIUM mmol/L 4.9   CHLORIDE mmol/L 111*   CO2 mmol/L 19.0*   BUN mg/dL 48*   CREATININE mg/dL 2.74*   CALCIUM mg/dL 8.7   BILIRUBIN mg/dL 0.3   ALK PHOS U/L 83   ALT (SGPT) U/L 33   AST (SGOT) U/L 39   GLUCOSE mg/dL 160*         Results from last 7 days   Lab Units 08/17/22  2303 08/17/22  0014 08/16/22  0005   ALBUMIN g/dL 3.40* 3.00* 3.10*             Results from last 7 days   Lab Units 08/17/22  2303   MAGNESIUM mg/dL 1.8                   Meds:    SCHEDULE  ascorbic acid, 1,000 mg, Oral, Daily  enoxaparin, 30 mg, Subcutaneous, Q24H  insulin lispro, 0-14 Units, Subcutaneous, 4x Daily With Meals & Nightly  pantoprazole, 40 mg, Oral, Q AM  pregabalin, 150 mg, Oral, TID  sertraline, 100 mg, Oral, Daily  sodium chloride, 10 mL, Intravenous, Q12H  thiamine, 100 mg, Oral, Daily  traZODone, 50 mg, Oral, Nightly  cholecalciferol, 5,000 Units, Oral, Daily  zinc sulfate, 220 mg, Oral, Daily      Infusions  sodium chloride, 75 mL/hr, Last Rate: 75 mL/hr (08/18/22 0852)      PRNs    acetaminophen **OR** acetaminophen    albuterol sulfate HFA    aluminum-magnesium hydroxide-simethicone    dextrose    dextrose    glucagon (human recombinant)    HYDROcodone-acetaminophen    nitroglycerin    ondansetron **OR** ondansetron    sodium chloride    Physical Exam:  Physical Exam  General Appearance:  Alert. No distress noted.   HEENT:  Normocephalic, without obvious abnormality, Conjunctiva/corneas clear,.  Normal external ear canals, Nares normal, no drainage     Neck:  Supple, symmetrical, trachea midline. No JVD.  Lungs /Chest wall: Few basal rales bilaterally respirations unlabored symmetrical wall movement.     Heart:  Regular rate and rhythm, systolic murmur PMI left sternal border  Abdomen: Soft, non-tender, no masses, no organomegaly.    Extremities: No edema, no clubbing or cyanosis      ROS  Review of Systems  Constitutional: Negative for chills, fever and malaise/fatigue.   HENT: Negative.    Eyes: Negative.    Cardiovascular: Negative.    Respiratory: Positive for cough and shortness of breath, improving.    Skin: Negative.    Musculoskeletal: Negative.    Gastrointestinal: Negative.    Genitourinary: Negative.    Neurological: Negative.    Psychiatric/Behavioral: Negative.      I have reviewed current clinicals.     Electronically signed by JORDYN Ring, 08/18/22, 6:37 AM EDT.     The NP scribed the note for me, I have examined the patient and reviewed and  verified the above findings and and I formulated the assessment and plan as documented

## 2022-08-18 NOTE — PROGRESS NOTES
[facility] Medicine Services Daily Progress Note    Patient Name: Shalom Alexis  : 1935  MRN: [id number]  Primary Care Physician:  Ron Hensley MD  Date of admission: 2022      Subjective      Chief Complaint: Abdominal pain, diarrhea, feeling bad    2022  Patient Reports no new complaints or acute events.    8/15/2022  The patient reports that his diarrhea is somewhat better.  The patient remains on IV fluids and on oxygen at 3 L/min with a saturation of 94%.    2022  Overall the patient continues to show some improvement.  His diarrhea is abating.  Overall he is starting to feel better.    2022  Patient continues to show improvement day over day.  No new issues.  Diarrhea is improving.    2022  Patient continues to show small daily improvements.  Continues to struggle with hearing loss.    Review of Systems   Constitutional: Negative.   HENT: Negative.    Eyes: Negative.    Cardiovascular: Negative.    Endocrine: Negative.    Hematologic/Lymphatic: Negative.    Skin: Negative.    Musculoskeletal: Negative.    Gastrointestinal:        Patient reports that his GI symptoms are much improved.   Genitourinary: Negative.    Neurological: Negative.    Psychiatric/Behavioral: Negative.    Allergic/Immunologic: Negative.    All other systems reviewed and are negative.        Objective      Vitals:   Temp:  [96.8 °F (36 °C)-97.8 °F (36.6 °C)] 97.8 °F (36.6 °C)  Heart Rate:  [67-88] 67  Resp:  [16-19] 19  BP: (115-158)/(71-83) 115/73  Flow (L/min):  [3] 3      Vital signs reviewed.  Nursing notes reviewed.  General: well-developed and well-nourished, NAD.  The patient is still quite hearing impaired.  HEENT: NC/AT, EOMI, PERRLA, very [place]  Heart: RRR. No murmur, S3 or S4  Chest: CTAB, no w/r/r, normal respiratory effort  Abdominal: Soft. NT/ND. Bowel sounds present.  There is no guarding or rebound and no abdominal tenderness to palpation.  Musculoskeletal:  Normal ROM.  No edema. No calf tenderness.  Neurological: AAOx3, no focal deficits  Skin: Skin is warm and dry. No rash  Psychiatric: Normal mood and affect overall showing improvement.    Result Review    Result Review:  I have personally reviewed the results from the time of this admission to 8/18/2022 18:29 EDT and agree with these findings:  [x]  Laboratory  [x]  Microbiology  [x]  Radiology  [x]  EKG/Telemetry   []  Cardiology/Vascular   []  Pathology  []  Old records  []  Other:   Most notable findings include    Wounds (last 24 hours)     LDA Wound     Row Name 08/18/22 0800 08/17/22 2000          Wound 08/13/22 1709 Left gluteal Pressure Injury    Wound - Properties Group Placement Date: 08/13/22  -LS Placement Time: 1709 -LS Present on Hospital Admission: --  -LS, unknown  Side: Left  -LS Location: gluteal  -LS Primary Wound Type: Pressure inj  -LS Additional Comments: stage 2 pressure injury  -LS     Pressure Injury Stage -- 2  -LH     Dressing Appearance open to air  -VA open to air  -LH     Closure CHRISSY  -VA --     Drainage Amount none  -VA none  -LH     Dressing Care open to air  -VA open to air  -LH     Retired Wound - Properties Group Placement Date: 08/13/22  -LS Placement Time: 1709 -LS Present on Hospital Admission: --  -LS, unknown  Side: Left  -LS Location: gluteal  -LS Primary Wound Type: Pressure inj  -LS Additional Comments: stage 2 pressure injury  -LS     Retired Wound - Properties Group Date first assessed: 08/13/22  -LS Time first assessed: 1709 -LS Present on Hospital Admission: --  -LS, unknown  Side: Left  -LS Location: gluteal  -LS Primary Wound Type: Pressure inj  -LS Additional Comments: stage 2 pressure injury  -LS            Wound 08/14/22 2100 Right anterior foot Abrasion    Wound - Properties Group Placement Date: 08/14/22  -LM Placement Time: 2100  -LM Side: Right  -LM Orientation: anterior  -LM Location: foot  -LM Primary Wound Type: Abrasion  -LM Additional Comments: Pt  states that he has had this wound for a while  -LM     Dressing Appearance dry;intact  -VA dry;intact  -LH     Retired Wound - Properties Group Placement Date: 08/14/22  -LM Placement Time: 2100  -LM Side: Right  -LM Orientation: anterior  -LM Location: foot  -LM Primary Wound Type: Abrasion  -LM Additional Comments: Pt states that he has had this wound for a while  -LM     Retired Wound - Properties Group Date first assessed: 08/14/22  -LM Time first assessed: 2100  -LM Side: Right  -LM Location: foot  -LM Primary Wound Type: Abrasion  -LM Additional Comments: Pt states that he has had this wound for a while  -LM           User Key  (r) = Recorded By, (t) = Taken By, (c) = Cosigned By    Initials Name Provider Type    Tala Kaur RN Registered Nurse     Rico, Arelis, RN Registered Nurse     Mauro, Ester, RN Registered Nurse    Lori Nielson RN Registered Nurse                 COVID-19 LAB PANEL     COVID-19 test result  COVID19   Date Value Ref Range Status   08/09/2022 Detected (C) Not Detected - Ref. Range Final       COVID-19 Prognostic lab results  WBC with differential  Results from last 7 days   Lab Units 08/17/22  2303 08/17/22  0014 08/16/22  0005 08/15/22  0148 08/14/22  0237 08/13/22  0421 08/12/22  0857   WBC 10*3/mm3 8.70 6.60 7.00 7.70 7.30 7.40 8.40   PLATELETS 10*3/mm3 189 163 162 210 161 178 173   NEUTROPHIL % % 64.5 61.2 52.5 56.9 66.3 66.4 68.4   LYMPHOCYTE % % 20.7 23.8 31.2 26.9 20.4 19.3* 19.6   NEUTROS ABS 10*3/mm3 5.60 4.00 3.70 4.40 4.80 4.90 5.80   LYMPHS ABS 10*3/mm3 1.80 1.60 2.20 2.10 1.50 1.40 1.60     Inflammatory markers  Results from last 7 days   Lab Units 08/17/22  2303 08/17/22  0014 08/16/22  0005 08/15/22  0542 08/14/22  0237 08/13/22  0421 08/12/22  0857   CRP mg/dL 2.10* 1.85* 2.70* 4.19* 3.12* 1.68* 1.19*   FERRITIN ng/mL 955.90* 904.70* 975.50* 924.40* 927.90* 912.10* 926.40*   PROCALCITONIN ng/mL 0.19 0.19 0.18 0.22 0.15 0.12 0.10   LDH U/L 244* 183 175  "167 168 194 215   ALK PHOS U/L 83 70 69 64 67 68 69   AST (SGOT) U/L 39 38 38 39 36 40 41*   ALT (SGPT) U/L 33 31 32 31 31 33 31       Poor COVID-19 outcomes associated with:  Neutrophil:Lymphocyte ratio >3.5  Thrombocytopenia, lymphopenia  LFT's >5x upper limit of normal  LDH >400      Assessment & Plan      Brief Patient Summary:  Shalom Alexis is a 87 y.o. male who tested positive for COVID-19 and flu B at [facility].  He presented with complaints of abdominal pain, diarrhea, decreased oral intake, and \"feeling bad all over.\"  Apparently the diarrhea has been going on for over 2 weeks.  He was found to have acute renal failure with BUN of 101 and creatinine 5.3 potassium 5.1.  He was also reportedly hypoxic requiring 2 L O2.        ascorbic acid, 1,000 mg, Oral, Daily  enoxaparin, 30 mg, Subcutaneous, Q24H  insulin lispro, 0-14 Units, Subcutaneous, 4x Daily With Meals & Nightly  pantoprazole, 40 mg, Oral, Q AM  pregabalin, 150 mg, Oral, TID  sertraline, 100 mg, Oral, Daily  sodium chloride, 10 mL, Intravenous, Q12H  thiamine, 100 mg, Oral, Daily  traZODone, 50 mg, Oral, Nightly  cholecalciferol, 5,000 Units, Oral, Daily  zinc sulfate, 220 mg, Oral, Daily       sodium chloride, 75 mL/hr, Last Rate: 75 mL/hr (08/18/22 0852)         Active Hospital Problems:  Active Hospital Problems    Diagnosis    • **Acute renal failure (ARF) (HCC)    • Acute hypoxemic respiratory failure due to COVID-19 (HCC)    • Diarrhea of presumed infectious origin    • Elevated troponin    • CAD (coronary artery disease)    • History of MI (myocardial infarction)    • [place] (hard of hearing)    • Hematochezia    • Metabolic acidosis      Plan:   Acute hypoxemic respirato  Acute hypoxemic respiratory failure due to Covid-19  -- Too late for Remdesivir  -- Dexamethasone x10 days  -- Supplemental O2 as needed  -- Monitor lab trends  -- Pulmonology following  -Patient showing some improvement       " -Hopefully home in a.m.  -Patient will need a 6-minute walk test prior to discharge    Acute renal failure  Metabolic acidosis  --Hold ACE inhibitor, Lasix  -- Nephrology following     Diarrhea - improving  --GI panel negative  --C. difficile negative  --Maintain hydration and electrolyte     Elevated troponin  -- ?  Demand ischemia vs kidney disease     DM Type II with hyperglycemia  -- glimepiride and januvia held on admission  -- [employer] AC/HS  -- SSI coverage as needed  -- check HgbA1c     GERD  --continue pantoprazole     Depression  --continue sertraline      Hard of hearing     DVT prophylaxis:  Medical and mechanical DVT prophylaxis orders are present.    CODE STATUS:    Code Status (Patient has no pulse and is not breathing): CPR (Attempt to Resuscitate)  Medical Interventions (Patient has pulse or is breathing): Full Support      Disposition:  I expect patient to be discharged pending clinical course and consultant recommendations.    This patient has been examined wearing appropriate Personal Protective Equipment. 08/18/22      Electronically signed by Amanda Keita MD, 08/18/22, 18:29 EDT.  [facility] Hospitalist Team

## 2022-08-18 NOTE — PLAN OF CARE
Goal Outcome Evaluation:                Assessment: Shalom Alexis presents with ADL impairments below baseline abilities which indicate the need for continued skilled intervention while inpatient. O2 sats remained WFL throughout therapy.  He demos visible fatigue when ambulating back from bathroom and reports this is due to back pain.  Tolerating session today without incident. Will continue to follow and progress as tolerated.     Plan/Recommendations:   Pt would benefit from Skilled Rehab placement at discharge from facility.   Pt desires Skilled Rehab placement at discharge. Pt cooperative; agreeable to therapeutic recommendations and plan of care.

## 2022-08-18 NOTE — THERAPY TREATMENT NOTE
Subjective: Pt agreeable to therapeutic plan of care.  Cognition: arousal/alertness: Alert and Attentive- [facility]     Objective:     Bed Mobility: N/A or Not attempted.   Functional Transfers: CGA and with rolling walker  Functional Ambulation: CGA and with rolling walker    Grooming: CGA  ADL Position: supported standing and at sink  ADL Comments: Washing face and using mouthwash      Vitals: WNL    Pain: 0 VAS  Education: Provided education on importance of mobility and skilled verbal / tactile cueing throughout intervention.     Assessment: Shalom Alexis presents with ADL impairments below baseline abilities which indicate the need for continued skilled intervention while inpatient. O2 sats remained WFL throughout therapy.  He demos visible fatigue when ambulating back from bathroom and reports this is due to back pain.  Tolerating session today without incident. Will continue to follow and progress as tolerated.     Plan/Recommendations:   Pt would benefit from Skilled Rehab placement at discharge from facility.   Pt desires Skilled Rehab placement at discharge. Pt cooperative; agreeable to therapeutic recommendations and plan of care.     Modified [place]: N/A = No pre-op stroke/TIA    Post-Tx Position: Up in Chair, Alarms activated and Call light and personal items within reach  PPE: gloves, surgical mask, eyewear protection

## 2022-08-18 NOTE — PROGRESS NOTES
"                                                                                                                                      Nephrology  Progress Note                                        [facility]    Patient Identification    Name: Shalom Alexis  Age: 87 y.o.  Sex: male  :  1935  MRN: [id number]      DATE OF SERVICE:  2022        Subective    Resting on oxygen sitting up in chair  Objective   Scheduled Meds:ascorbic acid, 1,000 mg, Oral, Daily  enoxaparin, 30 mg, Subcutaneous, Q24H  insulin lispro, 0-14 Units, Subcutaneous, 4x Daily With Meals & Nightly  pantoprazole, 40 mg, Oral, Q AM  pregabalin, 150 mg, Oral, TID  sertraline, 100 mg, Oral, Daily  sodium chloride, 10 mL, Intravenous, Q12H  thiamine, 100 mg, Oral, Daily  traZODone, 50 mg, Oral, Nightly  cholecalciferol, 5,000 Units, Oral, Daily  zinc sulfate, 220 mg, Oral, Daily          Continuous Infusions:sodium chloride, 75 mL/hr, Last Rate: 75 mL/hr (22 0540)        PRN Meds:•  acetaminophen **OR** acetaminophen  •  albuterol sulfate HFA  •  aluminum-magnesium hydroxide-simethicone  •  dextrose  •  dextrose  •  glucagon (human recombinant)  •  HYDROcodone-acetaminophen  •  nitroglycerin  •  ondansetron **OR** ondansetron  •  sodium chloride     Exam:  /71 (BP Location: Left arm, Patient Position: Lying)   Pulse 88   Temp 96.8 °F (36 °C) (Oral)   Resp 19   Ht 185.4 cm (73\")   Wt 95.5 kg (210 lb 8.6 oz)   SpO2 95%   BMI 27.78 kg/m²     Intake/Output last 3 shifts:  I/O last 3 completed shifts:  In: 5946 [P.O.:1667; I.V.:4279]  Out: 450 [Urine:450]    Intake/Output this shift:  I/O this shift:  In: -   Out: 500 [Urine:500]    Physical exam:  General Appearance:  no distress   ithout obvious abnormality, atraumatic  Eyes:  PERRL, conjunctiva/corneas clear     Neck:  Supple,  no adenopathy;      Lungs:  Decreased BS occasion ronchi  Heart:  Regular rate and rhythm, S1 and S2 normal  Abdomen:  Soft, " Problem: Patient Care Overview  Goal: Plan of Care/Patient Progress Review  Outcome: Improving  VITAL SIGNS STABLE.  Lungs clear, encouraged inspirometer use.  CMS intact.  Dressing to right hip small amount dried drainage, wound vac intact.  Pain controlled with po dilaudid.  Pt on iv ancef.  Hgb 9.6  Max assist of 2 to transfer from bed to chair.  Bilateral leg edema, encouraged elevation of lower extremities, pcd's applied.  Plan of care reviewed with pt.       non-tender, bowel sounds active   Extremities: trace edema  Pulses: 2+ and symmetric all extremities  Skin:  No rashes or lesions       Data Review:  All labs (24hrs):   Recent Results (from the past 24 hour(s))   POC Glucose Once    Collection Time: 08/17/22  8:19 AM    Specimen: Blood   Result Value Ref Range    Glucose 140 (H) 70 - 105 mg/dL   POC Glucose Once    Collection Time: 08/17/22 12:26 PM    Specimen: Blood   Result Value Ref Range    Glucose 130 (H) 70 - 105 mg/dL   POC Glucose Once    Collection Time: 08/17/22  5:21 PM    Specimen: Blood   Result Value Ref Range    Glucose 153 (H) 70 - 105 mg/dL   POC Glucose Once    Collection Time: 08/17/22 10:01 PM    Specimen: Blood   Result Value Ref Range    Glucose 161 (H) 70 - 105 mg/dL   Magnesium    Collection Time: 08/17/22 11:03 PM    Specimen: Blood   Result Value Ref Range    Magnesium 1.8 1.6 - 2.4 mg/dL   Phosphorus    Collection Time: 08/17/22 11:03 PM    Specimen: Blood   Result Value Ref Range    Phosphorus 2.2 (L) 2.5 - 4.5 mg/dL   Comprehensive Metabolic Panel    Collection Time: 08/17/22 11:03 PM    Specimen: Blood   Result Value Ref Range    Glucose 160 (H) 65 - 99 mg/dL    BUN 48 (H) 8 - 23 mg/dL    Creatinine 2.74 (H) 0.76 - 1.27 mg/dL    Sodium 139 136 - 145 mmol/L    Potassium 4.9 3.5 - 5.2 mmol/L    Chloride 111 (H) 98 - 107 mmol/L    CO2 19.0 (L) 22.0 - 29.0 mmol/L    Calcium 8.7 8.6 - 10.5 mg/dL    Total Protein 6.1 6.0 - 8.5 g/dL    Albumin 3.40 (L) 3.50 - 5.20 g/dL    ALT (SGPT) 33 1 - 41 U/L    AST (SGOT) 39 1 - 40 U/L    Alkaline Phosphatase 83 39 - 117 U/L    Total Bilirubin 0.3 0.0 - 1.2 mg/dL    Globulin 2.7 gm/dL    A/G Ratio 1.3 g/dL    BUN/Creatinine Ratio 17.5 7.0 - 25.0    Anion Gap 9.0 5.0 - 15.0 mmol/L    eGFR 22.0 (L) >60.0 mL/min/1.73   C-reactive Protein    Collection Time: 08/17/22 11:03 PM    Specimen: Blood   Result Value Ref Range    C-Reactive Protein 2.10 (H) 0.00 - 0.50 mg/dL   Procalcitonin    Collection Time:  08/17/22 11:03 PM    Specimen: Blood   Result Value Ref Range    Procalcitonin 0.19 0.00 - 0.25 ng/mL   Ferritin    Collection Time: 08/17/22 11:03 PM    Specimen: Blood   Result Value Ref Range    Ferritin 955.90 (H) 30.00 - 400.00 ng/mL   Lactate Dehydrogenase    Collection Time: 08/17/22 11:03 PM    Specimen: Blood   Result Value Ref Range     (H) 135 - 225 U/L   CBC Auto Differential    Collection Time: 08/17/22 11:03 PM    Specimen: Blood   Result Value Ref Range    WBC 8.70 3.40 - 10.80 10*3/mm3    RBC 3.72 (L) 4.14 - 5.80 10*6/mm3    Hemoglobin 11.3 (L) 13.0 - 17.7 g/dL    Hematocrit 33.9 (L) 37.5 - 51.0 %    MCV 91.0 79.0 - 97.0 fL    MCH 30.2 26.6 - 33.0 pg    MCHC 33.2 31.5 - 35.7 g/dL    RDW 14.6 12.3 - 15.4 %    RDW-SD 46.8 37.0 - 54.0 fl    MPV 10.3 6.0 - 12.0 fL    Platelets 189 140 - 450 10*3/mm3    Neutrophil % 64.5 42.7 - 76.0 %    Lymphocyte % 20.7 19.6 - 45.3 %    Monocyte % 11.3 5.0 - 12.0 %    Eosinophil % 3.0 0.3 - 6.2 %    Basophil % 0.5 0.0 - 1.5 %    Neutrophils, Absolute 5.60 1.70 - 7.00 10*3/mm3    Lymphocytes, Absolute 1.80 0.70 - 3.10 10*3/mm3    Monocytes, Absolute 1.00 (H) 0.10 - 0.90 10*3/mm3    Eosinophils, Absolute 0.30 0.00 - 0.40 10*3/mm3    Basophils, Absolute 0.00 0.00 - 0.20 10*3/mm3    nRBC 0.0 0.0 - 0.2 /100 WBC          Imaging:  XR Chest 1 View    Result Date: 8/15/2022  Improved aeration of the right lung base compared 08/09/2022 chest x-ray. No acute cardiopulmonary abnormality is identified.  Electronically Signed By-Lori Donato MD On:8/15/2022 1:42 PM This report was finalized on [id number] by  Lori Donato MD.      Assessment/Plan:     Acute renal failure (ARF) (HCC)    Acute hypoxemic respiratory failure due to COVID-19 (HCC)    Diarrhea of presumed infectious origin    Hematochezia    Metabolic acidosis    CAD (coronary artery disease)    History of MI (myocardial infarction)    [place] (hard of hearing)    Elevated troponin     Acute kidney  injury  Metabolic acidosis  Hyperkalemia  COVID-19 pneumonia  Elevated troponin  Hyperglycemia  Hard of hearing      Creatinine  Was same at 2.7 from 2.2- 2.4  Acidosis improving   on half-normal saline at 75   his volume is probably not a cause for his renal failure   urine sodium 71   Check urine creatinine protein  Ratio   could be ATN from infection versus covered nephrotoxicity

## 2022-08-18 NOTE — THERAPY TREATMENT NOTE
"Subjective: Pt agreeable to therapeutic plan of care.    Objective:     Bed mobility - N/A or Not attempted.  Transfers - CGA  Ambulation - 25 and 30 feet CGA and assist for O2 and line management  Therex: heel slides, SLR 2x10 in recliner, seated LAQs 2x10     Vitals: WNL    Pain: 0 VAS  Education: Provided education on importance of mobility and skilled verbal / tactile cueing throughout intervention.     Assessment: Shalom Alexis presents with functional mobility impairments which indicate the need for skilled intervention. Pt demo's improved LE stability and endurance during gait. No episodes of instability or LE buckling noted this date. Pt rito standing ~4 mins with 1-2 UE support while performing self care tasks. Pt does demo moderate forward flexed posture that worsens with fatigue. Pt performed seated therex to promote mobility and ms strength. Tolerating session today without incident. Will continue to follow and progress as tolerated.     Plan/Recommendations:   Moderate Intensity Therapy recommended post-acute care. This is recommended as therapy feels the patient would require 3-4 days per week and wouldn't tolerate \"3 hour daily\" rehab intensity. SNF would be the preferred choice. If the patient does not agree to SNF, arrange HH or OP depending on home bound status. If patient is medically complex, consider LTACH.. Pt requires no DME at discharge.     Pt desires Skilled Rehab placement at discharge. Pt cooperative; agreeable to therapeutic recommendations and plan of care.         Basic Mobility 6-click:  Rollin = Total, A lot = 2, A little = 3; 4 = None  Supine>Sit:   1 = Total, A lot = 2, A little = 3; 4 = None   Sit>Stand with arms:  1 = Total, A lot = 2, A little = 3; 4 = None  Bed>Chair:   1 = Total, A lot = 2, A little = 3; 4 = None  Ambulate in room:  1 = Total, A lot = 2, A little = 3; 4 = None  3-5 Steps with railin = Total, A lot = 2, A little = 3; 4 = None  Score: " 17    Modified [place]: N/A = No pre-op stroke/TIA    Post-Tx Position: Up in Chair, Alarms activated and Call light and personal items within reach  PPE: gloves, surgical mask, eyewear protection

## 2022-08-18 NOTE — PROGRESS NOTES
Exercise Oximetry    Patient Name:Shalom Alexis   MRN: [id number]   Date: 08/18/22             ROOM AIR BASELINE   SpO2% 86% room air   Heart Rate    Blood Pressure      EXERCISE ON ROOM AIR SpO2% EXERCISE ON O2 @ 2 LPM SpO2%   1 MINUTE  1 MINUTE 90   2 MINUTES  2 MINUTES 93   3 MINUTES  3 MINUTES 93   4 MINUTES  4 MINUTES 95   5 MINUTES  5 MINUTES 96   6 MINUTES  6 MINUTES 95              Distance Walked   Distance Walked   Dyspnea (Janes Scale)   Dyspnea (Janes Scale)   Fatigue (Janes Scale)   Fatigue (Janes Scale)   SpO2% Post Exercise   SpO2% Post Exercise 96% 2L NC    HR Post Exercise   HR Post Exercise   Time to Recovery   Time to Recovery     Comments:

## 2022-08-18 NOTE — PLAN OF CARE
Goal Outcome Evaluation:      Pt rested well during the night.  Will continue to monitor pt status

## 2022-08-18 NOTE — PLAN OF CARE
"Assessment: Shalom Alexis presents with functional mobility impairments which indicate the need for skilled intervention. Pt demo's improved LE stability and endurance during gait. No episodes of instability or LE buckling noted this date. Pt rito standing ~4 mins with 1-2 UE support while performing self care tasks. Pt does demo moderate forward flexed posture that worsens with fatigue. Pt performed seated therex to promote mobility and ms strength. Tolerating session today without incident. Will continue to follow and progress as tolerated.     Plan/Recommendations:   Moderate Intensity Therapy recommended post-acute care. This is recommended as therapy feels the patient would require 3-4 days per week and wouldn't tolerate \"3 hour daily\" rehab intensity. SNF would be the preferred choice. If the patient does not agree to SNF, arrange HH or OP depending on home bound status. If patient is medically complex, consider LTACH.. Pt requires no DME at discharge.     Pt desires Skilled Rehab placement at discharge. Pt cooperative; agreeable to therapeutic recommendations and plan of care.  "

## 2022-08-18 NOTE — CASE MANAGEMENT/SOCIAL WORK
Continued Stay Note  [place]     Patient Name: Shalom Alexis  MRN: [id number]  Today's Date: 8/18/2022    Admit Date: 8/9/2022     Discharge Plan     Row Name 08/18/22 8390       Plan    Plan Comments DC barriers: neph following, urine studies ordered, monitoring Cr. Family has concerns with how pt will transport home. Unsure if pt will meet EMS criteria. Anticipate new home O2.              Phone communication or documentation only - no physical contact with patient or family.      Megan Naegele, RN      Office Phone: [phone]  Office Cell: [phone]

## 2022-08-19 PROBLEM — D89.832 CYTOKINE RELEASE SYNDROME, GRADE 2: Status: ACTIVE | Noted: 2022-08-19

## 2022-08-19 LAB
ALBUMIN SERPL-MCNC: 3.2 G/DL (ref 3.5–5.2)
ALBUMIN/GLOB SERPL: 1.2 G/DL
ALP SERPL-CCNC: 85 U/L (ref 39–117)
ALT SERPL W P-5'-P-CCNC: 31 U/L (ref 1–41)
ANION GAP SERPL CALCULATED.3IONS-SCNC: 9 MMOL/L (ref 5–15)
AST SERPL-CCNC: 34 U/L (ref 1–40)
BASOPHILS # BLD AUTO: 0 10*3/MM3 (ref 0–0.2)
BASOPHILS NFR BLD AUTO: 0.6 % (ref 0–1.5)
BILIRUB SERPL-MCNC: 0.4 MG/DL (ref 0–1.2)
BUN SERPL-MCNC: 39 MG/DL (ref 8–23)
BUN/CREAT SERPL: 18.8 (ref 7–25)
CALCIUM SPEC-SCNC: 8.9 MG/DL (ref 8.6–10.5)
CHLORIDE SERPL-SCNC: 117 MMOL/L (ref 98–107)
CO2 SERPL-SCNC: 19 MMOL/L (ref 22–29)
CREAT SERPL-MCNC: 2.08 MG/DL (ref 0.76–1.27)
CRP SERPL-MCNC: 2.5 MG/DL (ref 0–0.5)
DEPRECATED RDW RBC AUTO: 46.4 FL (ref 37–54)
EGFRCR SERPLBLD CKD-EPI 2021: 30.6 ML/MIN/1.73
EOSINOPHIL # BLD AUTO: 0.2 10*3/MM3 (ref 0–0.4)
EOSINOPHIL NFR BLD AUTO: 3 % (ref 0.3–6.2)
ERYTHROCYTE [DISTWIDTH] IN BLOOD BY AUTOMATED COUNT: 14.5 % (ref 12.3–15.4)
FERRITIN SERPL-MCNC: 886 NG/ML (ref 30–400)
GLOBULIN UR ELPH-MCNC: 2.6 GM/DL
GLUCOSE BLDC GLUCOMTR-MCNC: 103 MG/DL (ref 70–105)
GLUCOSE BLDC GLUCOMTR-MCNC: 129 MG/DL (ref 70–105)
GLUCOSE BLDC GLUCOMTR-MCNC: 146 MG/DL (ref 70–105)
GLUCOSE BLDC GLUCOMTR-MCNC: 255 MG/DL (ref 70–105)
GLUCOSE SERPL-MCNC: 114 MG/DL (ref 65–99)
HCT VFR BLD AUTO: 32.9 % (ref 37.5–51)
HGB BLD-MCNC: 10.9 G/DL (ref 13–17.7)
LDH SERPL-CCNC: 170 U/L (ref 135–225)
LYMPHOCYTES # BLD AUTO: 1.7 10*3/MM3 (ref 0.7–3.1)
LYMPHOCYTES NFR BLD AUTO: 23.2 % (ref 19.6–45.3)
MAGNESIUM SERPL-MCNC: 1.6 MG/DL (ref 1.6–2.4)
MCH RBC QN AUTO: 30.1 PG (ref 26.6–33)
MCHC RBC AUTO-ENTMCNC: 33.1 G/DL (ref 31.5–35.7)
MCV RBC AUTO: 91 FL (ref 79–97)
MONOCYTES # BLD AUTO: 0.8 10*3/MM3 (ref 0.1–0.9)
MONOCYTES NFR BLD AUTO: 11.1 % (ref 5–12)
NEUTROPHILS NFR BLD AUTO: 4.6 10*3/MM3 (ref 1.7–7)
NEUTROPHILS NFR BLD AUTO: 62.1 % (ref 42.7–76)
NRBC BLD AUTO-RTO: 0.1 /100 WBC (ref 0–0.2)
PHOSPHATE SERPL-MCNC: 2.5 MG/DL (ref 2.5–4.5)
PLATELET # BLD AUTO: 167 10*3/MM3 (ref 140–450)
PMV BLD AUTO: 10.3 FL (ref 6–12)
POTASSIUM SERPL-SCNC: 4.6 MMOL/L (ref 3.5–5.2)
PROCALCITONIN SERPL-MCNC: 0.19 NG/ML (ref 0–0.25)
PROT SERPL-MCNC: 5.8 G/DL (ref 6–8.5)
RBC # BLD AUTO: 3.61 10*6/MM3 (ref 4.14–5.8)
SODIUM SERPL-SCNC: 145 MMOL/L (ref 136–145)
WBC NRBC COR # BLD: 7.4 10*3/MM3 (ref 3.4–10.8)

## 2022-08-19 PROCEDURE — 94618 PULMONARY STRESS TESTING: CPT

## 2022-08-19 PROCEDURE — 85025 COMPLETE CBC W/AUTO DIFF WBC: CPT | Performed by: NURSE PRACTITIONER

## 2022-08-19 PROCEDURE — 97110 THERAPEUTIC EXERCISES: CPT

## 2022-08-19 PROCEDURE — 25010000002 ENOXAPARIN PER 10 MG: Performed by: NURSE PRACTITIONER

## 2022-08-19 PROCEDURE — 97116 GAIT TRAINING THERAPY: CPT

## 2022-08-19 PROCEDURE — 82962 GLUCOSE BLOOD TEST: CPT

## 2022-08-19 PROCEDURE — 83615 LACTATE (LD) (LDH) ENZYME: CPT | Performed by: INTERNAL MEDICINE

## 2022-08-19 PROCEDURE — 63710000001 INSULIN LISPRO (HUMAN) PER 5 UNITS: Performed by: INTERNAL MEDICINE

## 2022-08-19 PROCEDURE — 99232 SBSQ HOSP IP/OBS MODERATE 35: CPT | Performed by: INTERNAL MEDICINE

## 2022-08-19 PROCEDURE — 86140 C-REACTIVE PROTEIN: CPT | Performed by: INTERNAL MEDICINE

## 2022-08-19 PROCEDURE — 83735 ASSAY OF MAGNESIUM: CPT | Performed by: NURSE PRACTITIONER

## 2022-08-19 PROCEDURE — 84145 PROCALCITONIN (PCT): CPT | Performed by: INTERNAL MEDICINE

## 2022-08-19 PROCEDURE — 25010000002 MAGNESIUM SULFATE IN D5W 1G/100ML (PREMIX) 1-5 GM/100ML-% SOLUTION: Performed by: INTERNAL MEDICINE

## 2022-08-19 PROCEDURE — 82728 ASSAY OF FERRITIN: CPT | Performed by: INTERNAL MEDICINE

## 2022-08-19 PROCEDURE — 84100 ASSAY OF PHOSPHORUS: CPT | Performed by: NURSE PRACTITIONER

## 2022-08-19 PROCEDURE — 80053 COMPREHEN METABOLIC PANEL: CPT | Performed by: NURSE PRACTITIONER

## 2022-08-19 RX ORDER — THIAMINE MONONITRATE (VIT B1) 100 MG
100 TABLET ORAL DAILY
Qty: 3 TABLET | Refills: 0 | Status: CANCELLED | OUTPATIENT
Start: 2022-08-20 | End: 2022-08-23

## 2022-08-19 RX ORDER — ALBUTEROL SULFATE 90 UG/1
2 AEROSOL, METERED RESPIRATORY (INHALATION) EVERY 4 HOURS PRN
Qty: 1 G | Refills: 0 | Status: SHIPPED | OUTPATIENT
Start: 2022-08-19

## 2022-08-19 RX ORDER — ZINC SULFATE 50(220)MG
220 CAPSULE ORAL DAILY
Qty: 3 CAPSULE | Refills: 0 | Status: CANCELLED | OUTPATIENT
Start: 2022-08-20 | End: 2022-08-23

## 2022-08-19 RX ORDER — ONDANSETRON 4 MG/1
4 TABLET, FILM COATED ORAL EVERY 6 HOURS PRN
Qty: 30 TABLET | Refills: 0 | Status: CANCELLED | OUTPATIENT
Start: 2022-08-19

## 2022-08-19 RX ORDER — THIAMINE MONONITRATE (VIT B1) 100 MG
100 TABLET ORAL DAILY
Qty: 3 TABLET | Refills: 0 | Status: SHIPPED | OUTPATIENT
Start: 2022-08-20 | End: 2022-08-23

## 2022-08-19 RX ORDER — PANTOPRAZOLE SODIUM 40 MG/1
40 TABLET, DELAYED RELEASE ORAL
Qty: 30 TABLET | Refills: 0 | Status: SHIPPED | OUTPATIENT
Start: 2022-08-20

## 2022-08-19 RX ORDER — ZINC SULFATE 50(220)MG
220 CAPSULE ORAL DAILY
Qty: 3 CAPSULE | Refills: 0 | Status: SHIPPED | OUTPATIENT
Start: 2022-08-20 | End: 2022-08-23

## 2022-08-19 RX ORDER — MAGNESIUM SULFATE 1 G/100ML
1 INJECTION INTRAVENOUS ONCE
Status: COMPLETED | OUTPATIENT
Start: 2022-08-19 | End: 2022-08-19

## 2022-08-19 RX ORDER — PANTOPRAZOLE SODIUM 40 MG/1
40 TABLET, DELAYED RELEASE ORAL
Qty: 30 TABLET | Refills: 0 | Status: CANCELLED | OUTPATIENT
Start: 2022-08-20 | End: 2022-09-19

## 2022-08-19 RX ADMIN — TRAZODONE HYDROCHLORIDE 50 MG: 50 TABLET ORAL at 21:13

## 2022-08-19 RX ADMIN — OXYCODONE HYDROCHLORIDE AND ACETAMINOPHEN 1000 MG: 500 TABLET ORAL at 09:53

## 2022-08-19 RX ADMIN — INSULIN LISPRO 8 UNITS: 100 INJECTION, SOLUTION INTRAVENOUS; SUBCUTANEOUS at 18:04

## 2022-08-19 RX ADMIN — Medication 100 MG: at 09:53

## 2022-08-19 RX ADMIN — PREGABALIN 150 MG: 75 CAPSULE ORAL at 21:12

## 2022-08-19 RX ADMIN — SERTRALINE 100 MG: 100 TABLET, FILM COATED ORAL at 09:53

## 2022-08-19 RX ADMIN — ZINC SULFATE 220 MG (50 MG) CAPSULE 220 MG: CAPSULE at 09:53

## 2022-08-19 RX ADMIN — ENOXAPARIN SODIUM 30 MG: 100 INJECTION SUBCUTANEOUS at 18:03

## 2022-08-19 RX ADMIN — PREGABALIN 150 MG: 75 CAPSULE ORAL at 09:53

## 2022-08-19 RX ADMIN — PANTOPRAZOLE SODIUM 40 MG: 40 TABLET, DELAYED RELEASE ORAL at 05:36

## 2022-08-19 RX ADMIN — Medication 10 ML: at 09:53

## 2022-08-19 RX ADMIN — Medication 5000 UNITS: at 09:53

## 2022-08-19 RX ADMIN — PREGABALIN 150 MG: 75 CAPSULE ORAL at 18:03

## 2022-08-19 RX ADMIN — Medication 10 ML: at 21:13

## 2022-08-19 RX ADMIN — MAGNESIUM SULFATE 1 G: 1 INJECTION INTRAVENOUS at 13:01

## 2022-08-19 NOTE — DISCHARGE PLACEMENT REQUEST
"Annmarie Harrison (87 y.o. Male)             Date of Birth   1935    Social Security Number       Address   [address]  [address] [place] [address]    Home Phone   [phone]    MRN   [id number]       Orthodoxy   Jain    Marital Status   Unknown                            Admission Date   8/9/22    Admission Type   Urgent    Admitting Provider   Tucker Gonzalez MD    Attending Provider   Amanda Keita MD    Department, Room/Bed   [facility] 3C MEDICAL INPATIENT, 364/1       Discharge Date       Discharge Disposition   [place] [facility] [place]charge Destination                               Attending Provider: Amanda Keita MD    Allergies: No Known Allergies    Isolation: Enh Drop/Con, Contact   Infection: COVID (confirmed) (08/09/22), 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)   Code Status: CPR   Advance Care Planning Activity    Ht: 185.4 cm (73\")   Wt: 95.5 kg (210 lb 8.6 oz)    Admission Cmt: None   Principal Problem: Acute renal failure (ARF) (HCC) [N17.9]                 Active Insurance as of 8/9/2022     Primary Coverage     Payor Plan Insurance Group Employer/Plan Group    MEDICARE MEDICARE A & B      Payor Plan Address Payor Plan Phone Number Payor Plan Fax Number Effective Dates    [address] [phone]  3/1/1999 - None Entered    [facility] [id number]       Subscriber Name Subscriber Birth Date Member ID       ANNMARIE HARRISON 1935 [id number]           Secondary Coverage     Payor Plan Insurance Group Employer/Plan Group    [place]      Payor Plan Address Payor Plan Phone Number Payor Plan Fax Number Effective Dates    [address]   3/23/2016 - None Entered    [facility] [id number]       Subscriber Name Subscriber Birth Date Member ID       ANNMARIE HARRISON 1935 [id number]                 Emergency Contacts      (Rel.) Home Phone Work Phone Mobile Phone    HUNTERSIRISHA (Spouse) -- -- [phone]    Rose Marie Harrison (Daughter) -- -- " [phone]    .,Felicita (Sister) [phone] -- --               Respiratory Therapy Notes (most recent note)      Mariia Bai, RRT at 22 1609          Exercise Oximetry    Patient Name:Shalom Alexis   MRN: [id number]   Date: 22             ROOM AIR BASELINE   SpO2%    94   Heart Rate    89   Blood Pressure      EXERCISE ON ROOM AIR SpO2% EXERCISE ON O2 @    2 LPM SpO2%   1 MINUTE 92 1 MINUTE    2 MINUTES 91 2 MINUTES    3 MINUTES 90 3 MINUTES    4 MINUTES 87 4 MINUTES    5 MINUTES  5 MINUTES 93   6 MINUTES  6 MINUTES 94              Distance Walked     300 feet Distance Walked   Dyspnea (Janes Scale)   Dyspnea (Janes Scale)    4   Fatigue (Janes Scale)   Fatigue (Janes Scale)    0   SpO2% Post Exercise   SpO2% Post Exercise     97   HR Post Exercise   HR Post Exercise    74   Time to Recovery   Time to Recovery     Comments: walked  Pt  In  Isolation  Room  With  Walker  Assist.  Pt  Walked  Steady  Pace.  Oxygen  Placed  On  Pt  At  2 lpm  Nc  When  Dropped  To  87%.  Oxygen  Saturation  Improved  To  92%.  Pt  Assessed  And  Will  Need  2  Lpm  Nc.     Electronically signed by Mariia Bai, RRT at 22 1617          Discharge Summary      Amanda Keita MD at 22 1804                       [facility] Medicine Services  DISCHARGE SUMMARY    Patient Name: Shalom Alexis  : 1935  MRN: [id number]    Date of Admission: 2022  Discharge Diagnosis: Acute hypoxemic respiratory failure due to COVID-19 accompanied by acute renal failure.  Date of Discharge: 2022  Primary Care Physician: Ron Hensley MD      Presenting Problem:   Acute renal failure (ARF) (HCC) [N17.9]    Active and Resolved Hospital Problems:  Active Hospital Problems    Diagnosis POA   • **Acute renal failure (ARF) (HCC) [N17.9] Yes     Priority: High   • Acute hypoxemic respiratory failure due to COVID-19 (HCC) [U07.1, J96.01] Yes     Priority: High   • CAD (coronary artery disease)  [I25.10] Yes     Priority: High   • Sun'aq (hard of hearing) [H91.90] Yes     Priority: High   • Diarrhea of presumed infectious origin [R19.7] Yes     Priority: Medium   • Elevated troponin [R77.8] Yes     Priority: Medium   • History of MI (myocardial infarction) [I25.2] Not Applicable     Priority: Medium   • Hematochezia [K92.1] Yes     Priority: Medium   • Metabolic acidosis [E87.2] Yes     Priority: Medium   • Cytokine release syndrome, grade 2 [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 male initially presented to the urgent care center because of feeling poorly with abdominal discomfort.  The patient was transferred from [facility] where he had been found to have COVID and acute kidney injury with a creatinine of 5.4 and an elevated potassium.  The patient was transferred to [facility] where he was initially admitted to the critical care service.  During his time there the patient seen by cardiology, nephrology and critical care.  The patient did not require dialysis and did not require mechanical ventilation.  The patient was treated for his acidosis with a bicarb drip.  The patient was treated for his COVID with standard protocol including Decadron, zinc, vitamin C and supportive care.  The patient was transitioned out of the ICU on the following day.  The patient was seen by cardiology as well for troponin elevation which they felt was not indicative of anything other than demand ischemia.  The patient was seen by physical therapy and Occupational Therapy.    The patient was transferred to the floor and continued to show improvement as he worked through this issue.  The patient's nutrition was evaluated, he was seen by nephrology, pulmonary, cardiology, occupational and physical therapy as well as respiratory therapy.  The patient slowly  continued to improve.    The patient is a full code at the time of discharge.  The patient's medications are as listed in that section of this report.  The patient had a walking oximetry done on the date of discharge which indicated that he needed 2 L/min per nasal cannula of oxygen.  This was arranged for through case management.  The patient had no pending studies at the time of discharge.  The patient is on a cardiac renal diet at the time of discharge.  The patient should follow-up with pulmonary in 1 to 2 weeks, with his primary care provider in 1 week, with Dr. Moraes of nephrology in 1 week and Dr. Whitley as per his surgeon's instructions.  The patient was discharged in satisfactory condition.      DISCHARGE Follow Up Recommendations for labs and diagnostics:       Reasons For Change In Medications and Indications for New Medications:      Day of Discharge     Vital Signs:  Temp:  [97.6 °F (36.4 °C)-98.1 °F (36.7 °C)] 98.1 °F (36.7 °C)  Heart Rate:  [71-95] 71  Resp:  [17-18] 18  BP: (123-162)/(71-82) 162/82  Flow (L/min):  [2-3] 2    Physical Exam:  Physical Exam  Vitals and nursing note reviewed.   Constitutional:       General: He is not in acute distress.     Appearance: Normal appearance. He is well-developed. He is not ill-appearing, toxic-appearing or diaphoretic.   HENT:      Head: Normocephalic and atraumatic.      Right Ear: Ear canal and external ear normal.      Left Ear: Ear canal and external ear normal.      Nose: Nose normal. No congestion or rhinorrhea.      Mouth/Throat:      Mouth: Mucous membranes are moist.      Pharynx: No oropharyngeal exudate.   Eyes:      General: No scleral icterus.        Right eye: No discharge.         Left eye: No discharge.      Extraocular Movements: Extraocular movements intact.      Conjunctiva/sclera: Conjunctivae normal.      Pupils: Pupils are equal, round, and reactive to light.   Neck:      Thyroid: No thyromegaly.      Vascular: No carotid bruit or JVD.       Trachea: No tracheal deviation.   Cardiovascular:      Rate and Rhythm: Normal rate and regular rhythm.      Pulses: Normal pulses.      Heart sounds: Normal heart sounds. No murmur heard.    No friction rub. No gallop.   Pulmonary:      Effort: Pulmonary effort is normal. No respiratory distress.      Breath sounds: Normal breath sounds. No stridor. No wheezing, rhonchi or rales.   Chest:      Chest wall: No tenderness.   Abdominal:      General: Bowel sounds are normal. There is no distension.      Palpations: Abdomen is soft. There is no mass.      Tenderness: There is no abdominal tenderness. There is no guarding or rebound.      Hernia: No hernia is present.   Musculoskeletal:         General: No swelling, tenderness, deformity or signs of injury. Normal range of motion.      Cervical back: Normal range of motion and neck supple. No rigidity. No muscular tenderness.      Right lower leg: No edema.      Left lower leg: No edema.   Lymphadenopathy:      Cervical: No cervical adenopathy.   Skin:     General: Skin is warm and dry.      Coloration: Skin is not jaundiced or pale.      Findings: No bruising, erythema or rash.   Neurological:      General: No focal deficit present.      Mental Status: He is alert and oriented to person, place, and time. Mental status is at baseline.      Cranial Nerves: No cranial nerve deficit.      Sensory: No sensory deficit.      Motor: No weakness or abnormal muscle tone.      Coordination: Coordination normal.   Psychiatric:         Mood and Affect: Mood normal.         Behavior: Behavior normal.         Thought Content: Thought content normal.         Judgment: Judgment normal.            Pertinent  and/or Most Recent Results     LAB RESULTS:      Lab 08/19/22  0257 08/17/22  2303 08/17/22  0014 08/16/22  0005 08/15/22  0542 08/15/22  0148   WBC 7.40 8.70 6.60 7.00  --  7.70   HEMOGLOBIN 10.9* 11.3* 10.3* 11.4*  --  10.8*   HEMATOCRIT 32.9* 33.9* 31.0* 34.8*  --  32.8*    PLATELETS 167 189 163 162  --  210   NEUTROS ABS 4.60 5.60 4.00 3.70  --  4.40   LYMPHS ABS 1.70 1.80 1.60 2.20  --  2.10   MONOS ABS 0.80 1.00* 0.70 0.90  --  1.00*   EOS ABS 0.20 0.30 0.20 0.20  --  0.10   MCV 91.0 91.0 90.4 90.3  --  91.7   CRP 2.50* 2.10* 1.85* 2.70* 4.19*  --    PROCALCITONIN 0.19 0.19 0.19 0.18 0.22  --     244* 183 175 167  --          Lab 08/19/22  0257 08/17/22  2303 08/17/22  0014 08/16/22  0005 08/15/22  0542   SODIUM 145 139 140 140 140   POTASSIUM 4.6 4.9 4.4 4.3 4.2   CHLORIDE 117* 111* 111* 110* 109*   CO2 19.0* 19.0* 18.0* 19.0* 21.0*   ANION GAP 9.0 9.0 11.0 11.0 10.0   BUN 39* 48* 52* 55* 56*   CREATININE 2.08* 2.74* 2.75* 2.27* 2.46*   EGFR 30.6* 22.0* 21.9* 27.6* 25.0*   GLUCOSE 114* 160* 160* 104* 89   CALCIUM 8.9 8.7 8.3* 8.6 8.7   MAGNESIUM 1.6 1.8 1.9 2.0 1.6   PHOSPHORUS 2.5 2.2* 3.2 3.2 3.9         Lab 08/19/22  0257 08/17/22  2303 08/17/22  0014 08/16/22  0005 08/15/22  0542   TOTAL PROTEIN 5.8* 6.1 5.5* 5.8* 5.5*   ALBUMIN 3.20* 3.40* 3.00* 3.10* 3.20*   GLOBULIN 2.6 2.7 2.5 2.7 2.3   ALT (SGPT) 31 33 31 32 31   AST (SGOT) 34 39 38 38 39   BILIRUBIN 0.4 0.3 0.2 0.3 0.4   ALK PHOS 85 83 70 69 64                 Lab 08/19/22  0257   FERRITIN 886.00*         Brief Urine Lab Results  (Last result in the past 365 days)      Color   Clarity   Blood   Leuk Est   Nitrite   Protein   CREAT   Urine HCG        08/18/22 2120 Yellow   Clear   Negative   Negative   Negative   100 mg/dL (2+)               Microbiology Results (last 10 days)     ** No results found for the last 240 hours. **          XR Chest 1 View    Result Date: 8/15/2022  Impression: Improved aeration of the right lung base compared 08/09/2022 chest x-ray. No acute cardiopulmonary abnormality is identified.  Electronically Signed By-Lori Donato MD On:8/15/2022 1:42 PM This report was finalized on [id number] by  Lori Donato MD.    XR Chest 1 View    Result Date: 8/9/2022  Impression: Right basilar  atelectasis  Electronically Signed By-Tony De La Torre On:8/9/2022 7:48 AM This report was finalized on [id number] by  Tony De La Torre, .              Labs Pending at Discharge:      Procedures Performed           Consults:   Consults     Date and Time Order Name Status Description    8/10/2022 10:10 AM Inpatient Cardiology Consult Completed     8/9/2022 11:03 AM Inpatient Hospitalist Consult      8/9/2022  3:30 AM Inpatient Nephrology Consult Completed             Discharge Details        Discharge Medications      New Medications      Instructions Start Date   albuterol sulfate  (90 Base) MCG/ACT inhaler  Commonly known as: PROVENTIL HFA;VENTOLIN HFA;PROAIR HFA   2 puffs, Inhalation, Every 4 Hours PRN      ascorbic acid 1000 MG tablet  Commonly known as: VITAMIN C   1,000 mg, Oral, Daily   Start Date: August 20, 2022     pantoprazole 40 MG EC tablet  Commonly known as: PROTONIX   40 mg, Oral, Every Early Morning   Start Date: August 20, 2022     thiamine 100 MG tablet  Commonly known as: VITAMIN B-1   100 mg, Oral, Daily   Start Date: August 20, 2022     vitamin D3 125 MCG (5000 UT) capsule capsule   5,000 Units, Oral, Daily   Start Date: August 20, 2022     zinc sulfate 220 (50 Zn) MG capsule  Commonly known as: ZINCATE   220 mg, Oral, Daily   Start Date: August 20, 2022        Continue These Medications      Instructions Start Date   glimepiride 1 MG tablet  Commonly known as: AMARYL   1 mg, Oral, Every Morning Before Breakfast      HYDROcodone-acetaminophen 5-325 MG per tablet  Commonly known as: NORCO   1 tablet, Oral, 3 Times Daily PRN      pregabalin 150 MG capsule  Commonly known as: LYRICA   150 mg, Oral, 3 Times Daily      sertraline 100 MG tablet  Commonly known as: ZOLOFT   100 mg, Oral, Daily      SITagliptin 25 MG tablet  Commonly known as: JANUVIA   25 mg, Oral, Daily      traZODone 50 MG tablet  Commonly known as: DESYREL   50 mg, Oral, Nightly         Stop These Medications    fenofibrate  145 MG tablet  Commonly known as: TRICOR     furosemide 40 MG tablet  Commonly known as: LASIX     lisinopril 5 MG tablet  Commonly known as: PRINIVIL,ZESTRIL     omeprazole 20 MG capsule  Commonly known as: priLOSEC            No Known Allergies      Dis[place] [facility] [place]iet:  Hospital:  Diet Order   Procedures   • Diet Cardiac, Diabetic/Consistent Carbs; Healthy Heart; Diabetic - Consistent Carb         Discharge Activity:   Activity Instructions     Activity as Tolerated      Up WIth Assist              CODE STATUS:  Code Status and Medical Interventions:   Ordered at: 08/09/22 0330     Code Status (Patient has no pulse and is not breathing):    CPR (Attempt to Resuscitate)     Medical Interventions (Patient has pulse or is breathing):    Full Support         No future appointments.    Additional Instructions for the Follow-ups that You Need to Schedule     Ambulatory Referral to Home Health   As directed      Face to Face Visit Date: 8/19/2022    Follow-up provider for Plan of Care?: I treated the patient in an acute care facility and will not continue treatment after discharge.    Follow-up provider: RON HENSLEY [[id number]]    Reason/Clinical Findings: Diffuse weakness    Describe mobility limitations that make leaving home difficult: Patient with a new diagnosis requiring home oxygen at 2 L/min per nasal cannula.  He is diffusely weak and will need help in the home.    Nursing/Therapeutic Services Requested: Physical Therapy Skilled Nursing    Skilled nursing orders: O2 instruction    PT orders: Other (add comment)    Frequency: 1 Week 1         Discharge Follow-up with PCP   As directed       Currently Documented PCP:    Ron Hensley MD    PCP Phone Number:    [phone]     Follow Up Details: In 1 week with a basic metabolic profile she needs to be called to Dr. Moraes         Discharge Follow-up with PCP   As directed       Currently Documented PCP:    Ron Hensley  "MD David    PCP Phone Number:    [phone]     Follow Up Details: in one week         Discharge Follow-up with Specialty: Follow-up with nephrology-Dr. Moraes in 2 weeks time; 2 Weeks   As directed      Specialty: Follow-up with nephrology-Dr. Moraes in 2 weeks time    Follow Up: 2 Weeks         Discharge Follow-up with Specialty: Follow-up with pulmonary in 2 weeks time   As directed      Specialty: Follow-up with pulmonary in 2 weeks time               Time spent on Discharge including face to face service:  34 minutes    This patient has been examined wearing appropriate Personal Protective Equipment and discussed with hospital infection control department. 22      Signature: Electronically signed by Amanda Keita MD, 22, 6:04 PM EDT.        Electronically signed by Amanda Keita MD at 22 1822       [address] MEDICAL INPATIENT  [address] [facility] [place] [address]  Dept. Phone:  [phone]  Dept. Fax:  [phone] Date Ordered: Aug 19, 2022         Patient:  Shalom Alexis MRN:  [id number]   [address]  [place] [address] :  1935  SSN:    Phone: [phone] Sex:  M     Weight: 95.5 kg (210 lb 8.6 oz)         Ht Readings from Last 1 Encounters:   22 185.4 cm (73\")         Home Oxygen Therapy          (Order ID: [id number])    Diagnosis:  Acute hypoxemic respiratory failure due to COVID-19 (HCC) (U07.1,J96.01 [ICD-10-CM] 518.81,079.89,799.02 [ICD-9-CM])   Quantity:  1     Delivery Modality: Nasal Cannula  Liters Per Minute: 2  Duration: Continuous  Equipment:  Oxygen Concentrator &  &  Portable Gaseous Oxygen System & Portable Oxygen Contents Gaseous  The face to face evaluation was performed on: 2022 (1630)  Length of Need (99 Months = Lifetime): 99 Months = Lifetime        Authorizing Provider's Phone: [phone]  Authorizing Provider:Amanda Keita MD  Authorizing Provider's NPI: [id number]  Order Entered By: " Amanda Keita MD 8/19/2022  4:36 PM     Electronically signed by: Amanda Keita MD 8/19/2022  4:36 PM

## 2022-08-19 NOTE — THERAPY TREATMENT NOTE
"Subjective: Pt agreeable to therapeutic plan of care. Pt up in BSC upon entering room.     Objective:     Bed mobility - N/A or Not attempted.     Transfers - 5x STS CGA and with rolling walker Pt able to stand ~5 min CGA while PTA performed pericare.     Ambulation - 3x30 feet CGA and with rolling walker. Pt with forward flexed posture, slow gait speed, and generalized weakness.     Therex - x10 reps B ankle pumps, LAQ, sitting marches, hip add.     Vitals: Desats after first bout of amb to 88% on 2L O2. Pt remained above 91% after each additional bout of amb.     Pain: 0 VAS    Education: Provided education on importance of mobility and skilled verbal / tactile cueing throughout intervention.     Assessment: Shalom Alexis presents with functional mobility impairments which indicate the need for skilled intervention. Tolerating session today without incident. Pt's functional mobility has steadily improved since initial eval. Pt is near functional baseline and would be appropriate to safely discharge home with HHPT services. Recommend having 24/7 supervision and assistance initially to assure safety until pt completely returns to PLOF. Will continue to follow and progress as tolerated.     Plan/Recommendations:   Low Intensity Therapy recommended post-acute care - This is recommended as therapy feels this patient would require 2-3 visits per week. OP or HH would be the best option depending on patient's home bound status. Consider, if the patient has other  \"skilled\" needs such as wounds, IV antibiotics, etc. Combined with \"low intensity\" could also equate to a SNF. If patient is medically complex, consider LTAC.. Pt requires no DME at discharge.     Pt desires Home with family assist and and Home Health at discharge. Pt cooperative; agreeable to therapeutic recommendations and plan of care.         Basic Mobility 6-click:  Rollin = Total, A lot = 2, A little = 3; 4 = None  Supine>Sit:   1 = Total, A " lot = 2, A little = 3; 4 = None   Sit>Stand with arms:  1 = Total, A lot = 2, A little = 3; 4 = None  Bed>Chair:   1 = Total, A lot = 2, A little = 3; 4 = None  Ambulate in room:  1 = Total, A lot = 2, A little = 3; 4 = None  3-5 Steps with railin = Total, A lot = 2, A little = 3; 4 = None  Score: 20    Modified [place]: N/A = No pre-op stroke/TIA    Post-Tx Position: Up in Chair, Alarms activated and Call light and personal items within reach  PPE: gloves, surgical mask, eyewear protection

## 2022-08-19 NOTE — PROGRESS NOTES
Nutrition Services    Patient Name: Shalom Alexis  YOB: 1935  MRN: [id number]  Admission date: 8/9/2022    PPE Documentation        PPE Worn By Provider N/A, did not enter pt's room this date   PPE Worn By Patient  N/A     NUTRITION SCREENING      Encounter Information: Chart reviewed for follow up. Noted possible discharge.        PO Diet: Diet Cardiac, Diabetic/Consistent Carbs; Healthy Heart; Diabetic - Consistent Carb   PO Supplements:    PO Intake:  82% last 7 meals       Labs (reviewed below): reviewed        GI Function:  Stool Output  Stool Unmeasured Occurrence: 1 (08/19/22 1040)  Bowel Incontinence: Yes (08/19/22 1040)  Stool Amount: small (08/19/22 1040)          Skin: L gluteal stage 2       Weight Hx Review: Wt Readings from Last 10 Encounters:   08/18/22 0405 95.5 kg (210 lb 8.6 oz)   08/17/22 0401 93.5 kg (206 lb 3.2 oz)   08/16/22 0256 94.7 kg (208 lb 12.4 oz)   08/14/22 0249 94.4 kg (208 lb 1.8 oz)   08/09/22 0430 99.9 kg (220 lb 3.8 oz)   08/09/22 0331 99.9 kg (220 lb 3.8 oz)            Nutrition Intervention: [facility]/ diet  Add Boost Glucose Control BID (Provides 380 kcals, 32 g protein if consumed)          Results from last 7 days   Lab Units 08/19/22 0257 08/17/22 2303 08/17/22  0014   SODIUM mmol/L 145 139 140   POTASSIUM mmol/L 4.6 4.9 4.4   CHLORIDE mmol/L 117* 111* 111*   CO2 mmol/L 19.0* 19.0* 18.0*   BUN mg/dL 39* 48* 52*   CREATININE mg/dL 2.08* 2.74* 2.75*   CALCIUM mg/dL 8.9 8.7 8.3*   BILIRUBIN mg/dL 0.4 0.3 0.2   ALK PHOS U/L 85 83 70   ALT (SGPT) U/L 31 33 31   AST (SGOT) U/L 34 39 38   GLUCOSE mg/dL 114* 160* 160*     Results from last 7 days   Lab Units 08/19/22  0257 08/17/22  2303 08/17/22  0014   MAGNESIUM mg/dL 1.6 1.8 1.9   PHOSPHORUS mg/dL 2.5 2.2* 3.2   HEMOGLOBIN g/dL 10.9* 11.3* 10.3*   HEMATOCRIT % 32.9* 33.9* 31.0*     COVID19   Date Value Ref Range Status   08/09/2022 Detected (C) Not Detected - Ref. Range Final     Lab Results   Component Value Date     HGBA1C 7.1 (H) 08/11/2022       RD to follow up per protocol.    Electronically signed by:  Neelima Mosley RD  08/19/22 16:30 EDT

## 2022-08-19 NOTE — PROGRESS NOTES
Exercise Oximetry    Patient Name:Shalom Alexis   MRN: [id number]   Date: 08/19/22             ROOM AIR BASELINE   SpO2%    94   Heart Rate    89   Blood Pressure      EXERCISE ON ROOM AIR SpO2% EXERCISE ON O2 @    2 LPM SpO2%   1 MINUTE 92 1 MINUTE    2 MINUTES 91 2 MINUTES    3 MINUTES 90 3 MINUTES    4 MINUTES 87 4 MINUTES    5 MINUTES  5 MINUTES 93   6 MINUTES  6 MINUTES 94              Distance Walked     300 feet Distance Walked   Dyspnea (Janes Scale)   Dyspnea (Janes Scale)    4   Fatigue (Janes Scale)   Fatigue (Janes Scale)    0   SpO2% Post Exercise   SpO2% Post Exercise     97   HR Post Exercise   HR Post Exercise    74   Time to Recovery   Time to Recovery     Comments: walked  Pt  In  Isolation  Room  With  Walker  Assist.  Pt  Walked  Steady  Pace.  Oxygen  Placed  On  Pt  At  2 lpm  Nc  When  Dropped  To  87%.  Oxygen  Saturation  Improved  To  92%.  Pt  Assessed  And  Will  Need  2  Lpm  Nc.

## 2022-08-19 NOTE — CASE MANAGEMENT/SOCIAL WORK
Continued Stay Note  [facility]     Patient Name: Shalom Alexis  MRN: [id number]  Today's Date: 8/19/2022    Admit Date: 8/9/2022     Discharge Plan     Row Name 08/19/22 1852       Plan    Plan [facility] previously arranged. Needs home Oxygen arranged    Plan Comments Notified by  patient's nurse Arelis that patient is ready for dc and needs home oxygen and transportation to [place], [place] which is approximately [place] away from [place], [place]. Pt is ambulatory and doesn't have a medical need for ambulance transport.  spoke with patient.Pt [facility] and gave  permission to contact his daughter Yadira Bernardo. I was unable to reach her at the phone number listed. I called patient's sister-in-law Sharonda Núñez [phone]. She states Yadira is at work at [employer] to call her there ([phone]). I spoke with Yadira and confirmed that family will  patient tomorrow.She is aware of patient's new need for home oxygen.We discussed providers that are local to this facility and provide services in patient's home area. Per her consent, referral to be made to [facility]. She said the best number to call is Sharonda's number.She lives next to patient,and can let in dme provider when concentrator is delivered. She denies questions or concerns at this time. I spoke with Guerda at [facility] centralized call center.She said to send in order for review. Requested information sent and answer pending.               Discharge Codes    No documentation.               Expected Discharge Date and Time     Expected Discharge Date Expected Discharge Time    Aug 19, 2022         Cristela Salcedo RN, [facility]  Office: [phone]  Fax: [phone]  [email or web address]      I met with patient in room wearing PPE: mask and goggles,gown, gloves     Maintained distance greater than six feet and spent </=15 minutes in the room      Cristela Salcedo RN

## 2022-08-19 NOTE — PROGRESS NOTES
Daily Progress Note        Acute renal failure (ARF) (HCC)    Acute hypoxemic respiratory failure due to COVID-19 (HCC)    Diarrhea of presumed infectious origin    Hematochezia    Metabolic acidosis    CAD (coronary artery disease)    History of MI (myocardial infarction)    [place] (hard of hearing)    Elevated troponin      Assessment  Acute hypoxic respiratory failure due to COVID-19-tested + 8/9/2022  Acute renal failure  Diarrhea- improving  Elevated troponin  COPD  Type 2 diabetes  GERD  Depression  Hard of hearing  Former smoker    MRSA PCR + 8/9/2022        Plan  May discharge from pulmonary standpoint, but if does not discharge will continue to monitor for  other complications.  Follow-up in our office in 2 weeks.    6 min walk prior to d'c    Continue to titrate off oxygen- currently requiring 2L NC, was 3 L  Proventil  Vitamin C, D, zinc  Electrolyte/Glycemic control  DVT/GI Prophylaxis-Lovenox and Protonix  Normal saline IV infusion at 75 mL/HR  PT/OT following- requests post acute care/LTACH    Discharge plan is to go home with family and home health         COVID-19 LAB PANEL     COVID-19 test result  COVID19   Date Value Ref Range Status   08/09/2022 Detected (C) Not Detected - Ref. Range Final       COVID-19 Prognostic lab results  WBC with differential  Results from last 7 days   Lab Units 08/19/22  0257 08/17/22  2303 08/17/22  0014 08/16/22  0005 08/15/22  0148 08/14/22  0237 08/13/22  0421 08/12/22  0857   WBC 10*3/mm3 7.40 8.70 6.60 7.00 7.70 7.30 7.40 8.40   PLATELETS 10*3/mm3 167 189 163 162 210 161 178 173   NEUTROPHIL % % 62.1 64.5 61.2 52.5 56.9 66.3 66.4 68.4   LYMPHOCYTE % % 23.2 20.7 23.8 31.2 26.9 20.4 19.3* 19.6   NEUTROS ABS 10*3/mm3 4.60 5.60 4.00 3.70 4.40 4.80 4.90 5.80   LYMPHS ABS 10*3/mm3 1.70 1.80 1.60 2.20 2.10 1.50 1.40 1.60     Inflammatory markers  Results from last 7 days   Lab Units 08/19/22  0257 08/17/22  2303 08/17/22  0014 08/16/22  0005 08/15/22  0542 08/14/22  0237  08/13/22  0421 08/12/22  0857   CRP mg/dL 2.50* 2.10* 1.85* 2.70* 4.19* 3.12* 1.68* 1.19*   FERRITIN ng/mL 886.00* 955.90* 904.70* 975.50* 924.40* 927.90* 912.10* 926.40*   PROCALCITONIN ng/mL 0.19 0.19 0.19 0.18 0.22 0.15 0.12 0.10   LDH U/L 170 244* 183 175 167 168 194 215   ALK PHOS U/L 85 83 70 69 64 67 68 69   AST (SGOT) U/L 34 39 38 38 39 36 40 41*   ALT (SGPT) U/L 31 33 31 32 31 31 33 31       Poor COVID-19 outcomes associated with:  Neutrophil:Lymphocyte ratio >3.5  Thrombocytopenia, lymphopenia  LFT's >5x upper limit of normal  LDH >400   LOS: 10 days     Subjective         Objective     Vital signs for last 24 hours:  Vitals:    08/18/22 1900 08/19/22 0004 08/19/22 0320 08/19/22 0749   BP: 123/75 151/76 150/82 146/71   BP Location: Right arm Left arm Left arm Left arm   Patient Position: Lying Lying Lying Lying   Pulse: 76 86 95 84   Resp: 18 17 18 18   Temp: 97.6 °F (36.4 °C) 97.9 °F (36.6 °C) 97.6 °F (36.4 °C) 98 °F (36.7 °C)   TempSrc: Oral Oral Oral Oral   SpO2: 100% 96% 96% 95%   Weight:       Height:           Intake/Output last 3 shifts:  I/O last 3 completed shifts:  In: 1720 [P.O.:720; I.V.:1000]  Out: 1450 [Urine:1450]  Intake/Output this shift:  No intake/output data recorded.      Radiology  Imaging Results (Last 24 Hours)       ** No results found for the last 24 hours. **            Labs:  Results from last 7 days   Lab Units 08/19/22  0257   WBC 10*3/mm3 7.40   HEMOGLOBIN g/dL 10.9*   HEMATOCRIT % 32.9*   PLATELETS 10*3/mm3 167     Results from last 7 days   Lab Units 08/19/22  0257   SODIUM mmol/L 145   POTASSIUM mmol/L 4.6   CHLORIDE mmol/L 117*   CO2 mmol/L 19.0*   BUN mg/dL 39*   CREATININE mg/dL 2.08*   CALCIUM mg/dL 8.9   BILIRUBIN mg/dL 0.4   ALK PHOS U/L 85   ALT (SGPT) U/L 31   AST (SGOT) U/L 34   GLUCOSE mg/dL 114*         Results from last 7 days   Lab Units 08/19/22  0257 08/17/22  2303 08/17/22  0014   ALBUMIN g/dL 3.20* 3.40* 3.00*             Results from last 7 days   Lab  Units 08/19/22  0257   MAGNESIUM mg/dL 1.6                   Meds:   SCHEDULE  ascorbic acid, 1,000 mg, Oral, Daily  enoxaparin, 30 mg, Subcutaneous, Q24H  insulin lispro, 0-14 Units, Subcutaneous, 4x Daily With Meals & Nightly  pantoprazole, 40 mg, Oral, Q AM  pregabalin, 150 mg, Oral, TID  sertraline, 100 mg, Oral, Daily  sodium chloride, 10 mL, Intravenous, Q12H  thiamine, 100 mg, Oral, Daily  traZODone, 50 mg, Oral, Nightly  cholecalciferol, 5,000 Units, Oral, Daily  zinc sulfate, 220 mg, Oral, Daily      Infusions  sodium chloride, 75 mL/hr, Last Rate: 75 mL/hr (08/18/22 2119)      PRNs    acetaminophen **OR** acetaminophen    albuterol sulfate HFA    aluminum-magnesium hydroxide-simethicone    dextrose    dextrose    glucagon (human recombinant)    HYDROcodone-acetaminophen    nitroglycerin    ondansetron **OR** ondansetron    sodium chloride    Physical Exam:  Physical Exam  General Appearance:  Alert. No distress noted.   HEENT:  Normocephalic, without obvious abnormality, Conjunctiva/corneas clear,.  Normal external ear canals, Nares normal, no drainage     Neck:  Supple, symmetrical, trachea midline. No JVD.  Lungs /Chest wall: Few basal rales bilaterally respirations unlabored symmetrical wall movement.     Heart:  Regular rate and rhythm, systolic murmur PMI left sternal border  Abdomen: Soft, non-tender, no masses, no organomegaly.    Extremities: No edema, no clubbing or cyanosis      ROS  Review of Systems  Constitutional: Negative for chills, fever and malaise/fatigue.   HENT: Negative.    Eyes: Negative.    Cardiovascular: Negative.    Respiratory: Positive for cough and shortness of breath, improving.    Skin: Negative.    Musculoskeletal: Negative.    Gastrointestinal: Negative.    Genitourinary: Negative.    Neurological: Negative.    Psychiatric/Behavioral: Negative.      I have reviewed current clinicals.     Electronically signed by JORDYN Ring, 08/19/22, 6:37 AM EDT.     The NP  scribed the note for me, I have examined the patient and reviewed and verified the above findings and and I formulated the assessment and plan as documented

## 2022-08-19 NOTE — CASE MANAGEMENT/SOCIAL WORK
Continued Stay Note   [facility]     Patient Name: Shalom Alexis  MRN: [id number]  Today's Date: 8/19/2022    Admit Date: 8/9/2022     Discharge Plan     Row Name 08/19/22 1522       Plan    Plan D/C Plan: Home with family and [facility] (accepted, need order) Refuses SNF. Need 6 min walk.    Plan Comments PT eval recc SNF.  Per CM note earlier this week, daughter refused SNF. Potentila issues with discharge ride, may not qualify for ems, family in [facility].  Barriers to discharge: Nephrology following, monitor creatine.  Replace mag today.  Covid + Will need 6 min walk.            Phone communication or documentation only - no physical contact with patient or family.  Charisse Hughes RN      Office Phone [phone]  Office Cell [phone]'

## 2022-08-19 NOTE — PLAN OF CARE
"Shalom Alexis presents with functional mobility impairments which indicate the need for skilled intervention. Tolerating session today without incident. Pt's functional mobility has steadily improved since initial eval. Pt is near functional baseline and would be appropriate to safely discharge home with HHPT services. Recommend having 24/7 supervision initially to assure safety until pt completely returns to PLOF. Will continue to follow and progress as tolerated.     Plan/Recommendations:   Low Intensity Therapy recommended post-acute care - This is recommended as therapy feels this patient would require 2-3 visits per week. OP or HH would be the best option depending on patient's home bound status. Consider, if the patient has other  \"skilled\" needs such as wounds, IV antibiotics, etc. Combined with \"low intensity\" could also equate to a SNF. If patient is medically complex, consider LTAC.. Pt requires no DME at discharge.     Pt desires Home with family assist and and Home Health at discharge. Pt cooperative; agreeable to therapeutic recommendations and plan of care.     "

## 2022-08-19 NOTE — PLAN OF CARE
Goal Outcome Evaluation:      Pt rested well during the night.  Pt denies any pain or discomfort.  Will continue to monitor pt status.

## 2022-08-19 NOTE — PLAN OF CARE
Problem: Adult Inpatient Plan of Care  Goal: Plan of Care Review  Outcome: Ongoing, Progressing  Flowsheets (Taken 8/19/2022 1927)  Plan of Care Reviewed With: patient  Outcome Evaluation: patient stable on 2 liters oxygen. He will discharge tomorrow pending family picking him up. will continue to monitor  Goal: Patient-Specific Goal (Individualized)  Outcome: Ongoing, Progressing  Goal: Absence of Hospital-Acquired Illness or Injury  Outcome: Ongoing, Progressing  Intervention: Identify and Manage Fall Risk  Description: Perform standard risk assessment on admission using a validated tool or comprehensive approach appropriate to the patient; reassess fall risk frequently, with change in status or transfer to another level of care.  Communicate fall injury risk to interprofessional healthcare team.  Determine need for increased observation, equipment and environmental modification, such as low bed, signage and supportive, nonskid footwear.  Adjust safety measures to individual developmental age, stage and identified risk factors.  Reinforce the importance of safety and physical activity with patient and family.  Perform regular intentional rounding to assess need for position change, pain assessment and personal needs, including assistance with toileting.  Recent Flowsheet Documentation  Taken 8/19/2022 1601 by Arelis Gómez, RN  Safety Promotion/Fall Prevention:   activity supervised   assistive device/personal items within reach   clutter free environment maintained   fall prevention program maintained   gait belt   nonskid shoes/slippers when out of bed   room organization consistent   safety round/check completed  Intervention: Prevent Skin Injury  Description: Perform a screening for skin injury risk, such as pressure or moisture associated skin damage on admission and at regular intervals throughout hospital stay.  Keep all areas of skin (especially folds) clean and dry.  Maintain adequate skin  hydration.  Relieve and redistribute pressure and protect bony prominences; implement measures based on patient-specific risk factors.  Match turning and repositioning schedule to clinical condition.  Encourage weight shift frequently; assist with reposition if unable to complete independently.  Float heels off bed; avoid pressure on the Achilles tendon.  Keep skin free from extended contact with medical devices.  Encourage functional activity and mobility, as early as tolerated.  Use aids (e.g., slide boards, mechanical lift) during transfer.  Recent Flowsheet Documentation  Taken 8/19/2022 1601 by Arelis Gómez RN  Body Position: position changed independently  Goal: Optimal Comfort and Wellbeing  Outcome: Ongoing, Progressing  Goal: Readiness for Transition of Care  Outcome: Ongoing, Progressing   Goal Outcome Evaluation:  Plan of Care Reviewed With: patient           Outcome Evaluation: patient stable on 2 liters oxygen. He will discharge tomorrow pending family picking him up. will continue to monitor

## 2022-08-19 NOTE — DISCHARGE SUMMARY
[facility]  DISCHARGE SUMMARY    Patient Name: Shalom Alexis  : 1935  MRN: [id number]    Date of Admission: 2022  Discharge Diagnosis: Acute hypoxemic respiratory failure due to COVID-19 accompanied by acute renal failure.  Date of Discharge: 2022  Primary Care Physician: Ron Hensley MD      Presenting Problem:   Acute renal failure (ARF) ([facility]) [N17.9]    Active and Resolved Hospital Problems:  Active Hospital Problems    Diagnosis POA   • **Acute renal failure (ARF) (HCC) [N17.9] Yes     Priority: High   • Acute hypoxemic respiratory failure due to COVID-19 (HCC) [U07.1, J96.01] Yes     Priority: High   • CAD (coronary artery disease) [I25.10] Yes     Priority: High   • [place] (hard of hearing) [H91.90] Yes     Priority: High   • Diarrhea of presumed infectious origin [R19.7] Yes     Priority: Medium   • Elevated troponin [R77.8] Yes     Priority: Medium   • History of MI (myocardial infarction) [I25.2] Not Applicable     Priority: Medium   • Hematochezia [K92.1] Yes     Priority: Medium   • Metabolic acidosis [E87.2] Yes     Priority: Medium   • Cytokine release syndrome, 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 male initially presented to the urgent care center because of feeling poorly with abdominal discomfort.  The patient was transferred from [facility] where he had been found to have COVID and acute kidney injury with a creatinine of 5.4 and an elevated potassium.  The patient was transferred to [facility] where he was initially admitted to the critical care service.  During his time there the patient seen by cardiology, nephrology and critical care.  The patient did not require dialysis and did not require mechanical ventilation.  The patient was treated for his  acidosis with a bicarb drip.  The patient was treated for his COVID with standard protocol including Decadron, zinc, vitamin C and supportive care.  The patient was transitioned out of the ICU on the following day.  The patient was seen by cardiology as well for troponin elevation which they felt was not indicative of anything other than demand ischemia.  The patient was seen by physical therapy and Occupational Therapy.    The patient was transferred to the floor and continued to show improvement as he worked through this issue.  The patient's nutrition was evaluated, he was seen by nephrology, pulmonary, cardiology, occupational and physical therapy as well as respiratory therapy.  The patient slowly continued to improve.    The patient is a full code at the time of discharge.  The patient's medications are as listed in that section of this report.  The patient had a walking oximetry done on the date of discharge which indicated that he needed 2 L/min per nasal cannula of oxygen.  This was arranged for through case management.  The patient had no pending studies at the time of discharge.  The patient is on a cardiac renal diet at the time of discharge.  The patient should follow-up with pulmonary in 1 to 2 weeks, with his primary care provider in 1 week, with Dr. Moraes of nephrology in 1 week and Dr. Whitley as per his surgeon's instructions.  The patient was discharged in satisfactory condition.      DISCHARGE Follow Up Recommendations for labs and diagnostics:       Reasons For Change In Medications and Indications for New Medications:      Day of Discharge     Vital Signs:  Temp:  [97.6 °F (36.4 °C)-98.1 °F (36.7 °C)] 98.1 °F (36.7 °C)  Heart Rate:  [71-95] 71  Resp:  [17-18] 18  BP: (123-162)/(71-82) 162/82  Flow (L/min):  [2-3] 2    Physical Exam:  Physical Exam  Vitals and nursing note reviewed.   Constitutional:       General: He is not in acute distress.     Appearance: Normal appearance. He is  well-developed. He is not ill-appearing, toxic-appearing or diaphoretic.   HENT:      Head: Normocephalic and atraumatic.      Right Ear: Ear canal and external ear normal.      Left Ear: Ear canal and external ear normal.      Nose: Nose normal. No congestion or rhinorrhea.      Mouth/Throat:      Mouth: Mucous membranes are moist.      Pharynx: No oropharyngeal exudate.   Eyes:      General: No scleral icterus.        Right eye: No discharge.         Left eye: No discharge.      Extraocular Movements: Extraocular movements intact.      Conjunctiva/sclera: Conjunctivae normal.      Pupils: Pupils are equal, round, and reactive to light.   Neck:      Thyroid: No thyromegaly.      Vascular: No carotid bruit or JVD.      Trachea: No tracheal deviation.   Cardiovascular:      Rate and Rhythm: Normal rate and regular rhythm.      Pulses: Normal pulses.      Heart sounds: Normal heart sounds. No murmur heard.    No friction rub. No gallop.   Pulmonary:      Effort: Pulmonary effort is normal. No respiratory distress.      Breath sounds: Normal breath sounds. No stridor. No wheezing, rhonchi or rales.   Chest:      Chest wall: No tenderness.   Abdominal:      General: Bowel sounds are normal. There is no distension.      Palpations: Abdomen is soft. There is no mass.      Tenderness: There is no abdominal tenderness. There is no guarding or rebound.      Hernia: No hernia is present.   Musculoskeletal:         General: No swelling, tenderness, deformity or signs of injury. Normal range of motion.      Cervical back: Normal range of motion and neck supple. No rigidity. No muscular tenderness.      Right lower leg: No edema.      Left lower leg: No edema.   Lymphadenopathy:      Cervical: No cervical adenopathy.   Skin:     General: Skin is warm and dry.      Coloration: Skin is not jaundiced or pale.      Findings: No bruising, erythema or rash.   Neurological:      General: No focal deficit present.      Mental Status: He  is alert and oriented to person, place, and time. Mental status is at baseline.      Cranial Nerves: No cranial nerve deficit.      Sensory: No sensory deficit.      Motor: No weakness or abnormal muscle tone.      Coordination: Coordination normal.   Psychiatric:         Mood and Affect: Mood normal.         Behavior: Behavior normal.         Thought Content: Thought content normal.         Judgment: Judgment normal.            Pertinent  and/or Most Recent Results     LAB RESULTS:      Lab 08/19/22  0257 08/17/22  2303 08/17/22  0014 08/16/22  0005 08/15/22  0542 08/15/22  0148   WBC 7.40 8.70 6.60 7.00  --  7.70   HEMOGLOBIN 10.9* 11.3* 10.3* 11.4*  --  10.8*   HEMATOCRIT 32.9* 33.9* 31.0* 34.8*  --  32.8*   PLATELETS 167 189 163 162  --  210   NEUTROS ABS 4.60 5.60 4.00 3.70  --  4.40   LYMPHS ABS 1.70 1.80 1.60 2.20  --  2.10   MONOS ABS 0.80 1.00* 0.70 0.90  --  1.00*   EOS ABS 0.20 0.30 0.20 0.20  --  0.10   MCV 91.0 91.0 90.4 90.3  --  91.7   CRP 2.50* 2.10* 1.85* 2.70* 4.19*  --    PROCALCITONIN 0.19 0.19 0.19 0.18 0.22  --     244* 183 175 167  --          Lab 08/19/22  0257 08/17/22 2303 08/17/22  0014 08/16/22  0005 08/15/22  0542   SODIUM 145 139 140 140 140   POTASSIUM 4.6 4.9 4.4 4.3 4.2   CHLORIDE 117* 111* 111* 110* 109*   CO2 19.0* 19.0* 18.0* 19.0* 21.0*   ANION GAP 9.0 9.0 11.0 11.0 10.0   BUN 39* 48* 52* 55* 56*   CREATININE 2.08* 2.74* 2.75* 2.27* 2.46*   EGFR 30.6* 22.0* 21.9* 27.6* 25.0*   GLUCOSE 114* 160* 160* 104* 89   CALCIUM 8.9 8.7 8.3* 8.6 8.7   MAGNESIUM 1.6 1.8 1.9 2.0 1.6   PHOSPHORUS 2.5 2.2* 3.2 3.2 3.9         Lab 08/19/22  0257 08/17/22  2303 08/17/22  0014 08/16/22  0005 08/15/22  0542   TOTAL PROTEIN 5.8* 6.1 5.5* 5.8* 5.5*   ALBUMIN 3.20* 3.40* 3.00* 3.10* 3.20*   GLOBULIN 2.6 2.7 2.5 2.7 2.3   ALT (SGPT) 31 33 31 32 31   AST (SGOT) 34 39 38 38 39   BILIRUBIN 0.4 0.3 0.2 0.3 0.4   ALK PHOS 85 83 70 69 64                 Lab 08/19/22 0257   FERRITIN 886.00*          Brief Urine Lab Results  (Last result in the past 365 days)      Color   Clarity   Blood   Leuk Est   Nitrite   Protein   CREAT   Urine HCG        08/18/22 2120 Yellow   Clear   Negative   Negative   Negative   100 mg/dL (2+)               Microbiology Results (last 10 days)     ** No results found for the last 240 hours. **          XR Chest 1 View    Result Date: 8/15/2022  Impression: Improved aeration of the right lung base compared 08/09/2022 chest x-ray. No acute cardiopulmonary abnormality is identified.  Electronically Signed By-Lori Donato MD On:8/15/2022 1:42 PM This report was finalized on [id number] by  oLri Donato MD.    XR Chest 1 View    Result Date: 8/9/2022  Impression: Right basilar atelectasis  Electronically Signed By-Tony De La Torre On:8/9/2022 7:48 AM This report was finalized on [id number] by  Tony De La Torre, .              Labs Pending at Discharge:      Procedures Performed           Consults:   Consults     Date and Time Order Name Status Description    8/10/2022 10:10 AM Inpatient Cardiology Consult Completed     8/9/2022 11:03 AM Inpatient Hospitalist Consult      8/9/2022  3:30 AM Inpatient Nephrology Consult Completed             Discharge Details        Discharge Medications      New Medications      Instructions Start Date   albuterol sulfate  (90 Base) MCG/ACT inhaler  Commonly known as: PROVENTIL HFA;VENTOLIN HFA;PROAIR HFA   2 puffs, Inhalation, Every 4 Hours PRN      ascorbic acid 1000 MG tablet  Commonly known as: VITAMIN C   1,000 mg, Oral, Daily   Start Date: August 20, 2022     pantoprazole 40 MG EC tablet  Commonly known as: PROTONIX   40 mg, Oral, Every Early Morning   Start Date: August 20, 2022     thiamine 100 MG tablet  Commonly known as: VITAMIN B-1   100 mg, Oral, Daily   Start Date: August 20, 2022     vitamin D3 125 MCG (5000 UT) capsule capsule   5,000 Units, Oral, Daily   Start Date: August 20, 2022     zinc sulfate 220 (50 Zn) MG  capsule  Commonly known as: ZINCATE   220 mg, Oral, Daily   Start Date: August 20, 2022        Continue These Medications      Instructions Start Date   glimepiride 1 MG tablet  Commonly known as: AMARYL   1 mg, Oral, Every Morning Before Breakfast      HYDROcodone-acetaminophen 5-325 MG per tablet  Commonly known as: NORCO   1 tablet, Oral, 3 Times Daily PRN      pregabalin 150 MG capsule  Commonly known as: LYRICA   150 mg, Oral, 3 Times Daily      sertraline 100 MG tablet  Commonly known as: ZOLOFT   100 mg, Oral, Daily      SITagliptin 25 MG tablet  Commonly known as: JANUVIA   25 mg, Oral, Daily      traZODone 50 MG tablet  Commonly known as: DESYREL   50 mg, Oral, Nightly         Stop These Medications    fenofibrate 145 MG tablet  Commonly known as: TRICOR     furosemide 40 MG tablet  Commonly known as: LASIX     lisinopril 5 MG tablet  Commonly known as: PRINIVIL,ZESTRIL     omeprazole 20 MG capsule  Commonly known as: priLOSEC            No Known Allergies      Discharge Disposition:   Home-Health Care Sv    Diet:  Hospital:  Diet Order   Procedures   • Diet Cardiac, Diabetic/Consistent Carbs; Healthy Heart; Diabetic - Consistent Carb         Discharge Activity:   Activity Instructions     Activity as Tolerated      Up WIth Assist              CODE STATUS:  Code Status and Medical Interventions:   Ordered at: 08/09/22 0330     Code Status (Patient has no pulse and is not breathing):    CPR (Attempt to Resuscitate)     Medical Interventions (Patient has pulse or is breathing):    Full Support         No future appointments.    Additional Instructions for the Follow-ups that You Need to Schedule     Ambulatory Referral to Home Health   As directed      Face to Face Visit Date: 8/19/2022    Follow-up provider for Plan of Care?: I treated the patient in an acute care facility and will not continue treatment after discharge.    Follow-up provider: JESSE MCGEE [[id number]]    Reason/Clinical Findings: Diffuse  weakness    Describe mobility limitations that make leaving home difficult: Patient with a new diagnosis requiring home oxygen at 2 L/min per nasal cannula.  He is diffusely weak and will need help in the home.    Nursing/Therapeutic Services Requested: Physical Therapy Skilled Nursing    Skilled nursing orders: O2 instruction    PT orders: Other (add comment)    Frequency: 1 Week 1         Discharge Follow-up with PCP   As directed       Currently Documented PCP:    Ron Hensley MD    PCP Phone Number:    [phone]     Follow Up Details: In 1 week with a basic metabolic profile she needs to be called to Dr. Moraes         Discharge Follow-up with PCP   As directed       Currently Documented PCP:    Ron Hensley MD    PCP Phone Number:    [phone]     Follow Up Details: in one week         Discharge Follow-up with Specialty: Follow-up with nephrology-Dr. Moraes in 2 weeks time; 2 Weeks   As directed      Specialty: Follow-up with nephrology-Dr. Moraes in 2 weeks time    Follow Up: 2 Weeks         Discharge Follow-up with Specialty: Follow-up with pulmonary in 2 weeks time   As directed      Specialty: Follow-up with pulmonary in 2 weeks time           The patient had transportation issues and was unable to get a ride home so his discharge was held today.  Hopefully he will be able to make that transition in the a.m.    Time spent on Discharge including face to face service:  34 minutes    This patient has been examined wearing appropriate Personal Protective Equipment and discussed with hospital infection control department. 08/19/22      Signature: Electronically signed by Amanad Keita MD, 08/19/22, 6:04 PM EDT.

## 2022-08-19 NOTE — PROGRESS NOTES
"                                                                                                                                      Nephrology  Progress Note                                        [facility]    Patient Identification    Name: Shalom Alexis  Age: 87 y.o.  Sex: male  :  1935  MRN: [id number]      DATE OF SERVICE:  2022        Subective    Resting on oxygen sitting up in chair   no new complaints  Objective   Scheduled Meds:ascorbic acid, 1,000 mg, Oral, Daily  enoxaparin, 30 mg, Subcutaneous, Q24H  insulin lispro, 0-14 Units, Subcutaneous, 4x Daily With Meals & Nightly  pantoprazole, 40 mg, Oral, Q AM  pregabalin, 150 mg, Oral, TID  sertraline, 100 mg, Oral, Daily  sodium chloride, 10 mL, Intravenous, Q12H  thiamine, 100 mg, Oral, Daily  traZODone, 50 mg, Oral, Nightly  cholecalciferol, 5,000 Units, Oral, Daily  zinc sulfate, 220 mg, Oral, Daily          Continuous Infusions:sodium chloride, 75 mL/hr, Last Rate: 75 mL/hr (22)        PRN Meds:•  acetaminophen **OR** acetaminophen  •  albuterol sulfate HFA  •  aluminum-magnesium hydroxide-simethicone  •  dextrose  •  dextrose  •  glucagon (human recombinant)  •  HYDROcodone-acetaminophen  •  nitroglycerin  •  ondansetron **OR** ondansetron  •  sodium chloride     Exam:  /82 (BP Location: Left arm, Patient Position: Lying)   Pulse 95   Temp 97.6 °F (36.4 °C) (Oral)   Resp 18   Ht 185.4 cm (73\")   Wt 95.5 kg (210 lb 8.6 oz)   SpO2 96%   BMI 27.78 kg/m²     Intake/Output last 3 shifts:  I/O last 3 completed shifts:  In: 1720 [P.O.:720; I.V.:1000]  Out: 1450 [Urine:1450]    Intake/Output this shift:  No intake/output data recorded.    Physical exam:  General Appearance:  no distress   ithout obvious abnormality, atraumatic  Eyes:  PERRL, conjunctiva/corneas clear     Neck:  Supple,  no adenopathy;      Lungs:  Decreased BS occasion marqueschi  Heart:  Regular rate and rhythm, S1 and S2 normal  Abdomen:  " Soft, non-tender, bowel sounds active   Extremities: trace edema  Pulses: 2+ and symmetric all extremities  Skin:  No rashes or lesions       Data Review:  All labs (24hrs):   Recent Results (from the past 24 hour(s))   POC Glucose Once    Collection Time: 08/18/22 11:46 AM    Specimen: Blood   Result Value Ref Range    Glucose 148 (H) 70 - 105 mg/dL   POC Glucose Once    Collection Time: 08/18/22  4:39 PM    Specimen: Blood   Result Value Ref Range    Glucose 160 (H) 70 - 105 mg/dL   POC Glucose Once    Collection Time: 08/18/22  7:53 PM    Specimen: Blood   Result Value Ref Range    Glucose 112 (H) 70 - 105 mg/dL   Protein, Urine, Random - Urine, Clean Catch    Collection Time: 08/18/22  9:20 PM    Specimen: Urine, Clean Catch   Result Value Ref Range    Total Protein, Urine 72.4 mg/dL   Urinalysis With Microscopic If Indicated (No Culture) - Urine, Clean Catch    Collection Time: 08/18/22  9:20 PM    Specimen: Urine, Clean Catch   Result Value Ref Range    Color, UA Yellow Yellow, Straw    Appearance, UA Clear Clear    pH, UA <=5.0 5.0 - 8.0    Specific Gravity, UA 1.012 1.005 - 1.030    Glucose,  mg/dL (Trace) (A) Negative    Ketones, UA Negative Negative    Bilirubin, UA Negative Negative    Blood, UA Negative Negative    Protein,  mg/dL (2+) (A) Negative    Leuk Esterase, UA Negative Negative    Nitrite, UA Negative Negative    Urobilinogen, UA 0.2 E.U./dL 0.2 - 1.0 E.U./dL   Urinalysis, Microscopic Only - Urine, Clean Catch    Collection Time: 08/18/22  9:20 PM    Specimen: Urine, Clean Catch   Result Value Ref Range    RBC, UA None Seen None Seen /HPF    WBC, UA None Seen None Seen /HPF    Bacteria, UA None Seen None Seen /HPF    Squamous Epithelial Cells, UA None Seen None Seen, 0-2 /HPF    Hyaline Casts, UA 0-2 None Seen /LPF    Methodology Automated Microscopy    Magnesium    Collection Time: 08/19/22  2:57 AM    Specimen: Blood   Result Value Ref Range    Magnesium 1.6 1.6 - 2.4 mg/dL    Phosphorus    Collection Time: 08/19/22  2:57 AM    Specimen: Blood   Result Value Ref Range    Phosphorus 2.5 2.5 - 4.5 mg/dL   Comprehensive Metabolic Panel    Collection Time: 08/19/22  2:57 AM    Specimen: Blood   Result Value Ref Range    Glucose 114 (H) 65 - 99 mg/dL    BUN 39 (H) 8 - 23 mg/dL    Creatinine 2.08 (H) 0.76 - 1.27 mg/dL    Sodium 145 136 - 145 mmol/L    Potassium 4.6 3.5 - 5.2 mmol/L    Chloride 117 (H) 98 - 107 mmol/L    CO2 19.0 (L) 22.0 - 29.0 mmol/L    Calcium 8.9 8.6 - 10.5 mg/dL    Total Protein 5.8 (L) 6.0 - 8.5 g/dL    Albumin 3.20 (L) 3.50 - 5.20 g/dL    ALT (SGPT) 31 1 - 41 U/L    AST (SGOT) 34 1 - 40 U/L    Alkaline Phosphatase 85 39 - 117 U/L    Total Bilirubin 0.4 0.0 - 1.2 mg/dL    Globulin 2.6 gm/dL    A/G Ratio 1.2 g/dL    BUN/Creatinine Ratio 18.8 7.0 - 25.0    Anion Gap 9.0 5.0 - 15.0 mmol/L    eGFR 30.6 (L) >60.0 mL/min/1.73   C-reactive Protein    Collection Time: 08/19/22  2:57 AM    Specimen: Blood   Result Value Ref Range    C-Reactive Protein 2.50 (H) 0.00 - 0.50 mg/dL   Procalcitonin    Collection Time: 08/19/22  2:57 AM    Specimen: Blood   Result Value Ref Range    Procalcitonin 0.19 0.00 - 0.25 ng/mL   Ferritin    Collection Time: 08/19/22  2:57 AM    Specimen: Blood   Result Value Ref Range    Ferritin 886.00 (H) 30.00 - 400.00 ng/mL   Lactate Dehydrogenase    Collection Time: 08/19/22  2:57 AM    Specimen: Blood   Result Value Ref Range     135 - 225 U/L   CBC Auto Differential    Collection Time: 08/19/22  2:57 AM    Specimen: Blood   Result Value Ref Range    WBC 7.40 3.40 - 10.80 10*3/mm3    RBC 3.61 (L) 4.14 - 5.80 10*6/mm3    Hemoglobin 10.9 (L) 13.0 - 17.7 g/dL    Hematocrit 32.9 (L) 37.5 - 51.0 %    MCV 91.0 79.0 - 97.0 fL    MCH 30.1 26.6 - 33.0 pg    MCHC 33.1 31.5 - 35.7 g/dL    RDW 14.5 12.3 - 15.4 %    RDW-SD 46.4 37.0 - 54.0 fl    MPV 10.3 6.0 - 12.0 fL    Platelets 167 140 - 450 10*3/mm3    Neutrophil % 62.1 42.7 - 76.0 %    Lymphocyte % 23.2  19.6 - 45.3 %    Monocyte % 11.1 5.0 - 12.0 %    Eosinophil % 3.0 0.3 - 6.2 %    Basophil % 0.6 0.0 - 1.5 %    Neutrophils, Absolute 4.60 1.70 - 7.00 10*3/mm3    Lymphocytes, Absolute 1.70 0.70 - 3.10 10*3/mm3    Monocytes, Absolute 0.80 0.10 - 0.90 10*3/mm3    Eosinophils, Absolute 0.20 0.00 - 0.40 10*3/mm3    Basophils, Absolute 0.00 0.00 - 0.20 10*3/mm3    nRBC 0.1 0.0 - 0.2 /100 WBC          Imaging:  XR Chest 1 View    Result Date: 8/15/2022  Improved aeration of the right lung base compared 08/09/2022 chest x-ray. No acute cardiopulmonary abnormality is identified.  Electronically Signed By-Lori Donato MD On:8/15/2022 1:42 PM This report was finalized on [id number] by  Lori Donato MD.      Assessment/Plan:     Acute renal failure (ARF) (HCC)    Acute hypoxemic respiratory failure due to COVID-19 (HCC)    Diarrhea of presumed infectious origin    Hematochezia    Metabolic acidosis    CAD (coronary artery disease)    History of MI (myocardial infarction)    [place] (hard of hearing)    Elevated troponin     Acute kidney injury  Metabolic acidosis  Hyperkalemia  COVID-19 pneumonia  Elevated troponin  Hyperglycemia  Hard of hearing      Creatinine   Down to 2.0  Acidosis improving   on half-normal saline at 75 reduce the rate to 50 cc an hour   his volume is probably not a cause for his renal failure     Check urine creatinine protein  Ratio   could be ATN from infection versus covered nephrotoxicity

## 2022-08-20 ENCOUNTER — READMISSION MANAGEMENT (OUTPATIENT)
Dept: CALL CENTER | Facility: HOSPITAL | Age: 87
End: 2022-08-20

## 2022-08-20 VITALS
SYSTOLIC BLOOD PRESSURE: 115 MMHG | TEMPERATURE: 97.8 F | DIASTOLIC BLOOD PRESSURE: 66 MMHG | RESPIRATION RATE: 19 BRPM | OXYGEN SATURATION: 100 % | HEIGHT: 73 IN | HEART RATE: 86 BPM | BODY MASS INDEX: 27.99 KG/M2 | WEIGHT: 211.2 LBS

## 2022-08-20 LAB
ANION GAP SERPL CALCULATED.3IONS-SCNC: 7 MMOL/L (ref 5–15)
BASOPHILS # BLD AUTO: 0.1 10*3/MM3 (ref 0–0.2)
BASOPHILS NFR BLD AUTO: 0.6 % (ref 0–1.5)
BUN SERPL-MCNC: 34 MG/DL (ref 8–23)
BUN/CREAT SERPL: 18.8 (ref 7–25)
CALCIUM SPEC-SCNC: 9.3 MG/DL (ref 8.6–10.5)
CHLORIDE SERPL-SCNC: 112 MMOL/L (ref 98–107)
CO2 SERPL-SCNC: 21 MMOL/L (ref 22–29)
CREAT SERPL-MCNC: 1.81 MG/DL (ref 0.76–1.27)
DEPRECATED RDW RBC AUTO: 47.7 FL (ref 37–54)
EGFRCR SERPLBLD CKD-EPI 2021: 36.2 ML/MIN/1.73
EOSINOPHIL # BLD AUTO: 0.2 10*3/MM3 (ref 0–0.4)
EOSINOPHIL NFR BLD AUTO: 1.9 % (ref 0.3–6.2)
ERYTHROCYTE [DISTWIDTH] IN BLOOD BY AUTOMATED COUNT: 14.8 % (ref 12.3–15.4)
GLUCOSE BLDC GLUCOMTR-MCNC: 117 MG/DL (ref 70–105)
GLUCOSE BLDC GLUCOMTR-MCNC: 169 MG/DL (ref 70–105)
GLUCOSE BLDC GLUCOMTR-MCNC: 82 MG/DL (ref 70–105)
GLUCOSE SERPL-MCNC: 141 MG/DL (ref 65–99)
HCT VFR BLD AUTO: 35 % (ref 37.5–51)
HGB BLD-MCNC: 11.7 G/DL (ref 13–17.7)
LYMPHOCYTES # BLD AUTO: 1.3 10*3/MM3 (ref 0.7–3.1)
LYMPHOCYTES NFR BLD AUTO: 15.2 % (ref 19.6–45.3)
MCH RBC QN AUTO: 30.5 PG (ref 26.6–33)
MCHC RBC AUTO-ENTMCNC: 33.5 G/DL (ref 31.5–35.7)
MCV RBC AUTO: 90.9 FL (ref 79–97)
MONOCYTES # BLD AUTO: 0.8 10*3/MM3 (ref 0.1–0.9)
MONOCYTES NFR BLD AUTO: 10 % (ref 5–12)
NEUTROPHILS NFR BLD AUTO: 6 10*3/MM3 (ref 1.7–7)
NEUTROPHILS NFR BLD AUTO: 72.3 % (ref 42.7–76)
NRBC BLD AUTO-RTO: 0.1 /100 WBC (ref 0–0.2)
PLATELET # BLD AUTO: 184 10*3/MM3 (ref 140–450)
PMV BLD AUTO: 9.8 FL (ref 6–12)
POTASSIUM SERPL-SCNC: 4.8 MMOL/L (ref 3.5–5.2)
RBC # BLD AUTO: 3.86 10*6/MM3 (ref 4.14–5.8)
SODIUM SERPL-SCNC: 140 MMOL/L (ref 136–145)
WBC NRBC COR # BLD: 8.4 10*3/MM3 (ref 3.4–10.8)

## 2022-08-20 PROCEDURE — 82962 GLUCOSE BLOOD TEST: CPT

## 2022-08-20 PROCEDURE — 99239 HOSP IP/OBS DSCHRG MGMT >30: CPT | Performed by: INTERNAL MEDICINE

## 2022-08-20 PROCEDURE — 80048 BASIC METABOLIC PNL TOTAL CA: CPT | Performed by: INTERNAL MEDICINE

## 2022-08-20 PROCEDURE — 85025 COMPLETE CBC W/AUTO DIFF WBC: CPT | Performed by: INTERNAL MEDICINE

## 2022-08-20 RX ADMIN — OXYCODONE HYDROCHLORIDE AND ACETAMINOPHEN 1000 MG: 500 TABLET ORAL at 08:02

## 2022-08-20 RX ADMIN — Medication 10 ML: at 08:02

## 2022-08-20 RX ADMIN — ZINC SULFATE 220 MG (50 MG) CAPSULE 220 MG: CAPSULE at 08:02

## 2022-08-20 RX ADMIN — Medication 5000 UNITS: at 08:02

## 2022-08-20 RX ADMIN — PANTOPRAZOLE SODIUM 40 MG: 40 TABLET, DELAYED RELEASE ORAL at 05:34

## 2022-08-20 RX ADMIN — PREGABALIN 150 MG: 75 CAPSULE ORAL at 08:02

## 2022-08-20 RX ADMIN — Medication 100 MG: at 08:02

## 2022-08-20 RX ADMIN — SERTRALINE 100 MG: 100 TABLET, FILM COATED ORAL at 08:02

## 2022-08-20 NOTE — PROGRESS NOTES
Daily Progress Note        Acute renal failure (ARF) (HCC)    Acute hypoxemic respiratory failure due to COVID-19 (HCC)    Diarrhea of presumed infectious origin    Hematochezia    Metabolic acidosis    CAD (coronary artery disease)    History of MI (myocardial infarction)    [place] (hard of hearing)    Elevated troponin    Cytokine release syndrome, grade 2      Assessment  Acute hypoxic respiratory failure due to COVID-19-tested + 8/9/2022  Acute renal failure  Diarrhea- improving  Elevated troponin  COPD  Type 2 diabetes  GERD  Depression  Hard of hearing  Former smoker    MRSA PCR + 8/9/2022        Plan  May discharge from pulmonary standpoint, but if does not discharge will continue to monitor for  other complications.  Follow-up in our office in 2 weeks.    6 min walk prior to d'c    Continue to titrate off oxygen- currently requiring 2L NC  Proventil  Vitamin C, D, zinc  Electrolyte/Glycemic control  DVT/GI Prophylaxis-Lovenox and Protonix  Normal saline IV infusion at 75 mL/HR  PT/OT following- requests post acute care/LTACH    Discharge plan is to go home with family and home health         COVID-19 LAB PANEL     COVID-19 test result  COVID19   Date Value Ref Range Status   08/09/2022 Detected (C) Not Detected - Ref. Range Final       COVID-19 Prognostic lab results  WBC with differential  Results from last 7 days   Lab Units 08/19/22  0257 08/17/22 2303 08/17/22  0014 08/16/22  0005 08/15/22  0148 08/14/22  0237   WBC 10*3/mm3 7.40 8.70 6.60 7.00 7.70 7.30   PLATELETS 10*3/mm3 167 189 163 162 210 161   NEUTROPHIL % % 62.1 64.5 61.2 52.5 56.9 66.3   LYMPHOCYTE % % 23.2 20.7 23.8 31.2 26.9 20.4   NEUTROS ABS 10*3/mm3 4.60 5.60 4.00 3.70 4.40 4.80   LYMPHS ABS 10*3/mm3 1.70 1.80 1.60 2.20 2.10 1.50     Inflammatory markers  Results from last 7 days   Lab Units 08/19/22  0257 08/17/22  2303 08/17/22  0014 08/16/22  0005 08/15/22  0542 08/14/22  0237   CRP mg/dL 2.50* 2.10* 1.85* 2.70* 4.19* 3.12*   FERRITIN  ng/mL 886.00* 955.90* 904.70* 975.50* 924.40* 927.90*   PROCALCITONIN ng/mL 0.19 0.19 0.19 0.18 0.22 0.15   LDH U/L 170 244* 183 175 167 168   ALK PHOS U/L 85 83 70 69 64 67   AST (SGOT) U/L 34 39 38 38 39 36   ALT (SGPT) U/L 31 33 31 32 31 31       Poor COVID-19 outcomes associated with:  Neutrophil:Lymphocyte ratio >3.5  Thrombocytopenia, lymphopenia  LFT's >5x upper limit of normal  LDH >400   LOS: 11 days     Subjective         Objective     Vital signs for last 24 hours:  Vitals:    08/19/22 2324 08/20/22 0423 08/20/22 0700 08/20/22 1216   BP: 106/64 110/68 159/85 137/74   BP Location: Left arm Left arm Left arm Right arm   Patient Position: Lying Lying Lying Lying   Pulse: 74 72 68 70   Resp: 16 16 18 19   Temp: 98 °F (36.7 °C) 98.7 °F (37.1 °C) 95.3 °F (35.2 °C) 97.7 °F (36.5 °C)   TempSrc: Oral Oral Oral Oral   SpO2: 96% 97% 96% 100%   Weight:  95.8 kg (211 lb 3.2 oz)     Height:           Intake/Output last 3 shifts:  I/O last 3 completed shifts:  In: 2120 [P.O.:1120; I.V.:1000]  Out: 1600 [Urine:1600]  Intake/Output this shift:  No intake/output data recorded.      Radiology  Imaging Results (Last 24 Hours)     ** No results found for the last 24 hours. **          Labs:  Results from last 7 days   Lab Units 08/19/22  0257   WBC 10*3/mm3 7.40   HEMOGLOBIN g/dL 10.9*   HEMATOCRIT % 32.9*   PLATELETS 10*3/mm3 167     Results from last 7 days   Lab Units 08/19/22  0257   SODIUM mmol/L 145   POTASSIUM mmol/L 4.6   CHLORIDE mmol/L 117*   CO2 mmol/L 19.0*   BUN mg/dL 39*   CREATININE mg/dL 2.08*   CALCIUM mg/dL 8.9   BILIRUBIN mg/dL 0.4   ALK PHOS U/L 85   ALT (SGPT) U/L 31   AST (SGOT) U/L 34   GLUCOSE mg/dL 114*         Results from last 7 days   Lab Units 08/19/22  0257 08/17/22  2303 08/17/22  0014   ALBUMIN g/dL 3.20* 3.40* 3.00*             Results from last 7 days   Lab Units 08/19/22  0257   MAGNESIUM mg/dL 1.6                   Meds:   SCHEDULE  ascorbic acid, 1,000 mg, Oral, Daily  enoxaparin, 30 mg,  Subcutaneous, Q24H  insulin lispro, 0-14 Units, Subcutaneous, 4x Daily With Meals & Nightly  pantoprazole, 40 mg, Oral, Q AM  pregabalin, 150 mg, Oral, TID  sertraline, 100 mg, Oral, Daily  sodium chloride, 10 mL, Intravenous, Q12H  thiamine, 100 mg, Oral, Daily  traZODone, 50 mg, Oral, Nightly  cholecalciferol, 5,000 Units, Oral, Daily  zinc sulfate, 220 mg, Oral, Daily      Infusions  sodium chloride, 50 mL/hr, Last Rate: 50 mL/hr (08/19/22 1300)      PRNs  •  acetaminophen **OR** acetaminophen  •  albuterol sulfate HFA  •  aluminum-magnesium hydroxide-simethicone  •  dextrose  •  dextrose  •  glucagon (human recombinant)  •  HYDROcodone-acetaminophen  •  nitroglycerin  •  ondansetron **OR** ondansetron  •  sodium chloride    Physical Exam:  Physical Exam  General Appearance:  Alert. No distress noted.   HEENT:  Normocephalic, without obvious abnormality, Conjunctiva/corneas clear,.  Normal external ear canals, Nares normal, no drainage     Neck:  Supple, symmetrical, trachea midline. No JVD.  Lungs /Chest wall: Few basal rales bilaterally respirations unlabored symmetrical wall movement.     Heart:  Regular rate and rhythm, systolic murmur PMI left sternal border  Abdomen: Soft, non-tender, no masses, no organomegaly.    Extremities: No edema, no clubbing or cyanosis      ROS  Review of Systems  Constitutional: Negative for chills, fever and malaise/fatigue.   HENT: Negative.    Eyes: Negative.    Cardiovascular: Negative.    Respiratory: Positive for cough and shortness of breath, improving.    Skin: Negative.    Musculoskeletal: Negative.    Gastrointestinal: Negative.    Genitourinary: Negative.    Neurological: Negative.    Psychiatric/Behavioral: Negative.      I have reviewed current clinical results    Much of this encounter note is an electronic transcription/translation of spoken language to printed text using Dragon Software which might include inadvertent errors in transcription.

## 2022-08-20 NOTE — PROGRESS NOTES
"                                                                                                                                      Nephrology  Progress Note                                        [facility]    Patient Identification    Name: Shalom Alexis  Age: 87 y.o.  Sex: male  :  1935  MRN: [id number]      DATE OF SERVICE:  2022        Subective    Resting on oxygen sitting up in chair   no new complaints  Objective   Scheduled Meds:ascorbic acid, 1,000 mg, Oral, Daily  enoxaparin, 30 mg, Subcutaneous, Q24H  insulin lispro, 0-14 Units, Subcutaneous, 4x Daily With Meals & Nightly  pantoprazole, 40 mg, Oral, Q AM  pregabalin, 150 mg, Oral, TID  sertraline, 100 mg, Oral, Daily  sodium chloride, 10 mL, Intravenous, Q12H  thiamine, 100 mg, Oral, Daily  traZODone, 50 mg, Oral, Nightly  cholecalciferol, 5,000 Units, Oral, Daily  zinc sulfate, 220 mg, Oral, Daily          Continuous Infusions:sodium chloride, 50 mL/hr, Last Rate: 50 mL/hr (22 1300)        PRN Meds:•  acetaminophen **OR** acetaminophen  •  albuterol sulfate HFA  •  aluminum-magnesium hydroxide-simethicone  •  dextrose  •  dextrose  •  glucagon (human recombinant)  •  HYDROcodone-acetaminophen  •  nitroglycerin  •  ondansetron **OR** ondansetron  •  sodium chloride     Exam:  /85 (BP Location: Left arm, Patient Position: Lying)   Pulse 68   Temp 95.3 °F (35.2 °C) (Oral)   Resp 18   Ht 185.4 cm (73\")   Wt 95.8 kg (211 lb 3.2 oz)   SpO2 96%   BMI 27.86 kg/m²     Intake/Output last 3 shifts:  I/O last 3 completed shifts:  In:  [P.O.:1120; I.V.:1000]  Out: 1600 [Urine:1600]    Intake/Output this shift:  No intake/output data recorded.    Physical exam:  General Appearance:  no distress   ithout obvious abnormality, atraumatic  Eyes:  PERRL, conjunctiva/corneas clear     Neck:  Supple,  no adenopathy;      Lungs:  Decreased BS occasion ronchi  Heart:  Regular rate and rhythm, S1 and S2 normal  Abdomen: "  Soft, non-tender, bowel sounds active   Extremities: trace edema  Pulses: 2+ and symmetric all extremities  Skin:  No rashes or lesions       Data Review:  All labs (24hrs):   Recent Results (from the past 24 hour(s))   POC Glucose Once    Collection Time: 08/19/22 12:37 PM    Specimen: Blood   Result Value Ref Range    Glucose 146 (H) 70 - 105 mg/dL   POC Glucose Once    Collection Time: 08/19/22  5:14 PM    Specimen: Blood   Result Value Ref Range    Glucose 255 (H) 70 - 105 mg/dL   POC Glucose Once    Collection Time: 08/19/22  7:11 PM    Specimen: Blood   Result Value Ref Range    Glucose 129 (H) 70 - 105 mg/dL   POC Glucose Once    Collection Time: 08/20/22  7:21 AM    Specimen: Blood   Result Value Ref Range    Glucose 82 70 - 105 mg/dL          Imaging:  XR Chest 1 View    Result Date: 8/15/2022  Improved aeration of the right lung base compared 08/09/2022 chest x-ray. No acute cardiopulmonary abnormality is identified.  Electronically Signed By-Lori Donato MD On:8/15/2022 1:42 PM This report was finalized on [id number] by  Lori Donato MD.      Assessment/Plan:     Acute renal failure (ARF) (HCC)    Acute hypoxemic respiratory failure due to COVID-19 (HCC)    Diarrhea of presumed infectious origin    Hematochezia    Metabolic acidosis    CAD (coronary artery disease)    History of MI (myocardial infarction)    [place] (hard of hearing)    Elevated troponin    Cytokine release syndrome, grade 2     Acute kidney injury  Metabolic acidosis  Hyperkalemia  COVID-19 pneumonia  Elevated troponin  Hyperglycemia  Hard of hearing      Creatinine   Down to 2.0  Creat has improved ok to dc   Fu in 2-3 wks   BMP in 1 week

## 2022-08-20 NOTE — DISCHARGE SUMMARY
[facility] Medicine Services  DISCHARGE SUMMARY    Patient Name: Shalom Alexis  : 1935  MRN: [id number]    Date of Admission: 2022  Discharge Diagnosis: COVID-19 with acute hypoxemic respiratory failure  Date of Discharge: 2022  Primary Care Physician: Ron Hensley MD      Presenting Problem:   Acute renal failure (ARF) (HCC) [N17.9]    Active and Resolved Hospital Problems:  Active Hospital Problems    Diagnosis POA   • **Acute renal failure (ARF) (HCC) [N17.9] Yes     Priority: High   • Acute hypoxemic respiratory failure due to COVID-19 (HCC) [U07.1, J96.01] Yes     Priority: High   • CAD (coronary artery disease) [I25.10] Yes     Priority: High   • [place] (hard of hearing) [H91.90] Yes     Priority: High   • Diarrhea of presumed infectious origin [R19.7] Yes     Priority: Medium   • Elevated troponin [R77.8] Yes     Priority: Medium   • History of MI (myocardial infarction) [I25.2] Not Applicable     Priority: Medium   • Hematochezia [K92.1] Yes     Priority: Medium   • Metabolic acidosis [E87.2] Yes     Priority: Medium   • Cytokine release syndrome, grade 2 [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 male who presented to an urgent care center because of feeling poorly and having abdominal discomfort.  The patient was found to have COVID-19 with acute kidney injury.  The patient was transferred from [facility] emergency room to [facility].  The patient was started on intravenous rehydration and a nephrology consultation was requested.  The patient was also seen by pulmonary and critical care as well as cardiology during his visit.  The patient was treated for his metabolic acidosis initially with a bicarb drip.  The patient had the standard protocol including Decadron, zinc, vitamin C and supportive  care as the patient was greater than 3 days out.  The patient had serial troponins drawn which were mildly elevated.  This was felt to be secondary to demand ischemia.  The patient was also seen by physical and Occupational Therapy while he was here.    The patient was transferred to the floor and to the hospitalist service where he continued to show improvement.  The patient's nutrition was evaluated by an nutritionist.  The patient was doing well and all agreed to discharge.  The patient does require oxygen per a 6-minute walk test which was done within the past 24 hours.  Patient qualified for 2 L/min per nasal cannula.      The patient is a full code at the time of discharge.  The patient's medications are as listed in that section of this report.  The patient had a walking oximetry done on the date of discharge which indicated that he needed 2 L/min per nasal cannula of oxygen.  This was arranged for through case management.  The patient had no pending studies at the time of discharge.  The patient is on a cardiac renal diet at the time of discharge.  The patient should follow-up with pulmonary in 1 to 2 weeks, with his primary care provider in 1 week, with Dr. Moraes of nephrology in 1 week and Dr. Whitley as per his surgeon's instructions.  The patient was discharged in satisfactory condition.          DISCHARGE Follow Up Recommendations for labs and diagnostics:       Reasons For Change In Medications and Indications for New Medications:      Day of Discharge     Vital Signs:  Temp:  [95.3 °F (35.2 °C)-98.7 °F (37.1 °C)] 97.7 °F (36.5 °C)  Heart Rate:  [68-74] 70  Resp:  [16-19] 19  BP: (106-162)/(64-85) 137/74  Flow (L/min):  [2] 2    Physical Exam:  Physical Exam  Vitals and nursing note reviewed.   Constitutional:       General: He is not in acute distress.     Appearance: Normal appearance. He is well-developed. He is not ill-appearing, toxic-appearing or diaphoretic.      Comments: The patient is  extremely hard of hearing.   HENT:      Head: Normocephalic and atraumatic.      Right Ear: Ear canal and external ear normal.      Left Ear: Ear canal and external ear normal.      Nose: Nose normal. No congestion or rhinorrhea.      Mouth/Throat:      Mouth: Mucous membranes are moist.      Pharynx: No oropharyngeal exudate.   Eyes:      General: No scleral icterus.        Right eye: No discharge.         Left eye: No discharge.      Extraocular Movements: Extraocular movements intact.      Conjunctiva/sclera: Conjunctivae normal.      Pupils: Pupils are equal, round, and reactive to light.   Neck:      Thyroid: No thyromegaly.      Vascular: No carotid bruit or JVD.      Trachea: No tracheal deviation.   Cardiovascular:      Rate and Rhythm: Normal rate and regular rhythm.      Pulses: Normal pulses.      Heart sounds: Normal heart sounds. No murmur heard.    No friction rub. No gallop.   Pulmonary:      Effort: Pulmonary effort is normal. No respiratory distress.      Breath sounds: Normal breath sounds. No stridor. No wheezing, rhonchi or rales.   Chest:      Chest wall: No tenderness.   Abdominal:      General: Bowel sounds are normal. There is no distension.      Palpations: Abdomen is soft. There is no mass.      Tenderness: There is no abdominal tenderness. There is no guarding or rebound.      Hernia: No hernia is present.   Musculoskeletal:         General: No swelling, tenderness, deformity or signs of injury. Normal range of motion.      Cervical back: Normal range of motion and neck supple. No rigidity. No muscular tenderness.      Right lower leg: No edema.      Left lower leg: No edema.   Lymphadenopathy:      Cervical: No cervical adenopathy.   Skin:     General: Skin is warm and dry.      Coloration: Skin is not jaundiced or pale.      Findings: No bruising, erythema or rash.   Neurological:      General: No focal deficit present.      Mental Status: He is alert and oriented to person, place, and  time. Mental status is at baseline.      Cranial Nerves: No cranial nerve deficit.      Sensory: No sensory deficit.      Motor: No weakness or abnormal muscle tone.      Coordination: Coordination normal.   Psychiatric:         Mood and Affect: Mood normal.         Behavior: Behavior normal.         Thought Content: Thought content normal.         Judgment: Judgment normal.            Pertinent  and/or Most Recent Results     LAB RESULTS:      Lab 08/20/22  1240 08/19/22  0257 08/17/22  2303 08/17/22  0014 08/16/22  0005 08/15/22  0542   WBC 8.40 7.40 8.70 6.60 7.00  --    HEMOGLOBIN 11.7* 10.9* 11.3* 10.3* 11.4*  --    HEMATOCRIT 35.0* 32.9* 33.9* 31.0* 34.8*  --    PLATELETS 184 167 189 163 162  --    NEUTROS ABS 6.00 4.60 5.60 4.00 3.70  --    LYMPHS ABS 1.30 1.70 1.80 1.60 2.20  --    MONOS ABS 0.80 0.80 1.00* 0.70 0.90  --    EOS ABS 0.20 0.20 0.30 0.20 0.20  --    MCV 90.9 91.0 91.0 90.4 90.3  --    CRP  --  2.50* 2.10* 1.85* 2.70* 4.19*   PROCALCITONIN  --  0.19 0.19 0.19 0.18 0.22   LDH  --  170 244* 183 175 167         Lab 08/20/22  1240 08/19/22  0257 08/17/22  2303 08/17/22  0014 08/16/22  0005 08/15/22  0542   SODIUM 140 145 139 140 140 140   POTASSIUM 4.8 4.6 4.9 4.4 4.3 4.2   CHLORIDE 112* 117* 111* 111* 110* 109*   CO2 21.0* 19.0* 19.0* 18.0* 19.0* 21.0*   ANION GAP 7.0 9.0 9.0 11.0 11.0 10.0   BUN 34* 39* 48* 52* 55* 56*   CREATININE 1.81* 2.08* 2.74* 2.75* 2.27* 2.46*   EGFR 36.2* 30.6* 22.0* 21.9* 27.6* 25.0*   GLUCOSE 141* 114* 160* 160* 104* 89   CALCIUM 9.3 8.9 8.7 8.3* 8.6 8.7   MAGNESIUM  --  1.6 1.8 1.9 2.0 1.6   PHOSPHORUS  --  2.5 2.2* 3.2 3.2 3.9         Lab 08/19/22  0257 08/17/22  2303 08/17/22  0014 08/16/22  0005 08/15/22  0542   TOTAL PROTEIN 5.8* 6.1 5.5* 5.8* 5.5*   ALBUMIN 3.20* 3.40* 3.00* 3.10* 3.20*   GLOBULIN 2.6 2.7 2.5 2.7 2.3   ALT (SGPT) 31 33 31 32 31   AST (SGOT) 34 39 38 38 39   BILIRUBIN 0.4 0.3 0.2 0.3 0.4   ALK PHOS 85 83 70 69 64                 Lab 08/19/22  0257    FERRITIN 886.00*         Brief Urine Lab Results  (Last result in the past 365 days)      Color   Clarity   Blood   Leuk Est   Nitrite   Protein   CREAT   Urine HCG        08/18/22 2120 Yellow   Clear   Negative   Negative   Negative   100 mg/dL (2+)               Microbiology Results (last 10 days)     ** No results found for the last 240 hours. **          XR Chest 1 View    Result Date: 8/15/2022  Impression: Improved aeration of the right lung base compared 08/09/2022 chest x-ray. No acute cardiopulmonary abnormality is identified.  Electronically Signed By-Lori Donato MD On:8/15/2022 1:42 PM This report was finalized on [id number] by  Lori Donato MD.    XR Chest 1 View    Result Date: 8/9/2022  Impression: Right basilar atelectasis  Electronically Signed By-Tony De La Torre On:8/9/2022 7:48 AM This report was finalized on [id number] by  Tony De La Torre, .                  Labs Pending at Discharge:      Procedures Performed           Consults:   Consults     Date and Time Order Name Status Description    8/10/2022 10:10 AM Inpatient Cardiology Consult Completed     8/9/2022 11:03 AM Inpatient Hospitalist Consult      8/9/2022  3:30 AM Inpatient Nephrology Consult Completed             Discharge Details        Discharge Medications      New Medications      Instructions Start Date   albuterol sulfate  (90 Base) MCG/ACT inhaler  Commonly known as: PROVENTIL HFA;VENTOLIN HFA;PROAIR HFA   2 puffs, Inhalation, Every 4 Hours PRN      ascorbic acid 1000 MG tablet  Commonly known as: VITAMIN C   1,000 mg, Oral, Daily      pantoprazole 40 MG EC tablet  Commonly known as: PROTONIX   40 mg, Oral, Every Early Morning      thiamine 100 MG tablet  Commonly known as: VITAMIN B-1   100 mg, Oral, Daily      vitamin D3 125 MCG (5000 UT) capsule capsule   5,000 Units, Oral, Daily      zinc sulfate 220 (50 Zn) MG capsule  Commonly known as: ZINCATE   220 mg, Oral, Daily         Continue These Medications       Instructions Start Date   glimepiride 1 MG tablet  Commonly known as: AMARYL   1 mg, Oral, Every Morning Before Breakfast      HYDROcodone-acetaminophen 5-325 MG per tablet  Commonly known as: NORCO   1 tablet, Oral, 3 Times Daily PRN      pregabalin 150 MG capsule  Commonly known as: LYRICA   150 mg, Oral, 3 Times Daily      sertraline 100 MG tablet  Commonly known as: ZOLOFT   100 mg, Oral, Daily      SITagliptin 25 MG tablet  Commonly known as: JANUVIA   25 mg, Oral, Daily      traZODone 50 MG tablet  Commonly known as: DESYREL   50 mg, Oral, Nightly         Stop These Medications    fenofibrate 145 MG tablet  Commonly known as: TRICOR     furosemide 40 MG tablet  Commonly known as: LASIX     lisinopril 5 MG tablet  Commonly known as: PRINIVIL,ZESTRIL     omeprazole 20 MG capsule  Commonly known as: priLOSEC            No Known Allergies      Discharge Disposition:     Home-Health Care [facility]    Diet:  Hospital:  Diet Order   Procedures   • Diet Cardiac, Diabetic/Consistent Carbs; Healthy Heart; Diabetic - Consistent Carb         Discharge Activity:   Activity Instructions     Activity as Tolerated      Up WIth Assist              CODE STATUS:  Code Status and Medical Interventions:   Ordered at: 08/09/22 0330     Code Status (Patient has no pulse and is not breathing):    CPR (Attempt to Resuscitate)     Medical Interventions (Patient has pulse or is breathing):    Full Support         No future appointments.    Additional Instructions for the Follow-ups that You Need to Schedule     Ambulatory Referral to Home Health   As directed      Face to Face Visit Date: 8/19/2022    Follow-up provider for Plan of Care?: I treated the patient in an acute care facility and will not continue treatment after discharge.    Follow-up provider: JESSE MCGEE [[id number]]    Reason/Clinical Findings: Diffuse weakness    Describe mobility limitations that make leaving home difficult: Patient with a new diagnosis requiring home  oxygen at 2 L/min per nasal cannula.  He is diffusely weak and will need help in the home.    Nursing/Therapeutic Services Requested: Physical Therapy Skilled Nursing    Skilled nursing orders: O2 instruction    PT orders: Other (add comment)    Frequency: 1 Week 1         Discharge Follow-up with PCP   As directed       Currently Documented PCP:    Ron Hensley MD    PCP Phone Number:    [phone]     Follow Up Details: In 1 week with a basic metabolic profile she needs to be called to Dr. Moraes         Discharge Follow-up with PCP   As directed       Currently Documented PCP:    Ron Hensley MD    PCP Phone Number:    [phone]     Follow Up Details: in one week         Discharge Follow-up with Specialty: Follow-up with nephrology-Dr. Moraes in 2 weeks time; 2 Weeks   As directed      Specialty: Follow-up with nephrology-Dr. Moraes in 2 weeks time    Follow Up: 2 Weeks         Discharge Follow-up with Specialty: Follow-up with pulmonary in 2 weeks time   As directed      Specialty: Follow-up with pulmonary in 2 weeks time               Time spent on Discharge including face to face service:35   minutes    This patient has been examined wearing appropriate Personal Protective Equipment and discussed with hospital infection control department. 08/20/22      Signature: Electronically signed by Amanda Keita MD, 08/20/22, 3:25 PM EDT.

## 2022-08-20 NOTE — PLAN OF CARE
Goal Outcome Evaluation:              Outcome Evaluation: Pt resting well thoughout shift on 2L NC. Continue to monitor.

## 2022-08-20 NOTE — OUTREACH NOTE
Prep Survey    Flowsheet Row Responses   [facility] patient discharged from? [facility]   Is LACE score < 7 ? No   Emergency Room discharge w/ pulse ox? No   Eligibility [facility]   Date of Admission 08/09/22   Date of Discharge 08/20/22   Discharge Disposition Home or Self Care   Discharge diagnosis Acute hypoxemic respiratory failure due to COVID-19    Does the patient have one of the following disease processes/diagnoses(primary or secondary)? COVID-19   Does the patient have Home health ordered? Yes   What is the Home health agency?  [facility]   Is there a DME ordered? Yes   What DME was ordered?  [place] [id number] through [facility]    Prep survey completed? Yes          NEHAL GONZALEZ - Registered Nurse

## 2022-08-20 NOTE — CASE MANAGEMENT/SOCIAL WORK
Continued Stay Note  [facility]     Patient Name: Shalom Alexis  MRN: [id number]  Today's Date: 8/19/2022    Admit Date: 8/9/2022     Discharge Plan     Row Name 08/19/22 2009       Plan    Plan [facility] [place] and [facility] for home oxygen at 2L    Plan Comments  spoke with Agustin at [facility]. He verified that [facility] can provide services. Will need to contact [facility] tomorrow for delivery of portable O2 tank to patient's room and to coordinate for the delivery of home O2 concentrator with the [place] office.    Row Name 08/19/22 1852       Plan    Plan [facility] previously arranged. Needs home Oxygen arranged    Plan Comments Notified by  patient's nurse Arelis that patient is ready for [place] and needs home oxygen and transportation to [place], [place] which is approximately [place] away from [place], [place]. Pt is ambulatory and doesn't have a medical need for ambulance transport.  spoke with patient.Pt [facility] and gave  permission to contact his daughter Yadira Bernardo. I was unable to reach her at the phone number listed. I called patient's sister-in-law Sharonda Núñez [phone]. She states Yadira is at work at [employer] to call her there ([phone]). I spoke with Yadira and confirmed that family will  patient tomorrow.She is aware of patient's new need for home oxygen.We discussed providers that are local to this facility and provide services in patient's home area. Per her consent, referral to be made to [facility]. She said the best number to call is Sharonda's number.She lives next to patient,and can let in dme provider when concentrator is delivered. She denies questions or concerns at this time. I spoke with Guerda at [facility] centralized call center.She said to send in order for review. Requested information sent and answer pending.               Discharge Codes    No documentation.               Expected Discharge Date and Time     Expected Discharge  Date Expected Discharge Time    Aug 19, 2022         Cristela Salcedo RN, [facility]  Office: [phone]  Fax: [phone]  [email or web address]      Phone communication or documentation only - no physical contact with patient or family.    Cristela Salcedo RN

## 2022-08-20 NOTE — CASE MANAGEMENT/SOCIAL WORK
Continued Stay Note  [facility]     Patient Name: Shalom Alexis  MRN: [id number]  Today's Date: 8/20/2022    Admit Date: 8/9/2022     Discharge Plan     Row Name 08/20/22 1509       Plan    Plan Plan for [facility]. New home 02 through [facility] at 2L.    Patient/Family in Agreement with Plan yes    Plan Comments CM spoke with [facility] answering service. She put me in contact with the drivers from both [place] and [place] locations. [place]  to meet the patient at bedside with the portable tank in 1-1.5 hrs. [place]  Kemal contacted patient sister Felicita. Felicita will let Kemal into the patient's home to complete home setup.  was en route. RN notified via phone.              Phone communication or documentation only - no physical contact with patient or family.    Joaquina Dunn RN  Weekend   [address] [address]  Office: [phone]  Fax: [phone]  [email or web address]

## 2022-08-22 ENCOUNTER — TRANSITIONAL CARE MANAGEMENT TELEPHONE ENCOUNTER (OUTPATIENT)
Dept: CALL CENTER | Facility: HOSPITAL | Age: 87
End: 2022-08-22

## 2022-08-22 LAB — QT INTERVAL: 417 MS

## 2022-08-22 NOTE — CASE MANAGEMENT/SOCIAL WORK
Case Management Discharge Note      Final Note: [facility].    Selected Continued Care - Discharged on 8/20/2022 Admission date: 8/9/2022 - Discharge disposition: Home-Health Care Svc        Durable Medical Equipment Coordination complete.    Service Provider Selected Services Address Phone Fax Patient Preferred    [facility]  Durable Medical Equipment [address], [place] [address] [phone] --              Home Medical Care Coordination complete.    Service Provider Selected Services Address Phone Fax Patient Preferred    [facility]  Home Health Services [address] [facility] [address], [facility] [id number] [phone] -- --           Transportation Services  Private: Car    Final Discharge Disposition Code: 06 - home with home health care

## 2022-08-22 NOTE — OUTREACH NOTE
Call Center TCM Note    Flowsheet Row Responses   M[facility] facility patient discharged from? [facility]   Does the patient have one of the following disease processes/diagnoses(primary or secondary)? COVID-19   COVID-19 underlying condition? None   TCM attempt successful? No   Unsuccessful attempts Attempt 1   Revoked Reason Other  [PCP is not a [facility] provider]   Discharge diagnosis Acute hypoxemic respiratory failure due to COVID-19           Heather Russell LPN    8/22/2022, 09:18 EDT

## 2022-08-24 ENCOUNTER — READMISSION MANAGEMENT (OUTPATIENT)
Dept: CALL CENTER | Facility: HOSPITAL | Age: 87
End: 2022-08-24

## 2022-08-24 NOTE — OUTREACH NOTE
"COVID-19 Week 1 Survey    Flowsheet Row Responses   Yazidi facility patient discharged from? [facility]   Does the patient have one of the following disease processes/diagnoses(primary or secondary)? COVID-19   COVID-19 underlying condition? None   Call Number Call 1   Week 1 Call successful? No  [Daughters number is \"not reachable\"]   Discharge diagnosis Acute hypoxemic respiratory failure due to COVID-19           NEO IRIZARRY - Registered Nurse  "

## 2022-08-26 ENCOUNTER — READMISSION MANAGEMENT (OUTPATIENT)
Dept: CALL CENTER | Facility: HOSPITAL | Age: 87
End: 2022-08-26

## 2022-08-26 NOTE — OUTREACH NOTE
COVID-19 Week 1 Survey    Flowsheet Row Responses   Rastafarian facility patient discharged from? [facility]   Does the patient have one of the following disease processes/diagnoses(primary or secondary)? COVID-19   COVID-19 underlying condition? None   Call Number Call 1   Week 1 Call successful? No   Discharge diagnosis Acute hypoxemic respiratory failure due to COVID-19           BRIGID LANIER - Registered Nurse

## 2022-08-28 ENCOUNTER — READMISSION MANAGEMENT (OUTPATIENT)
Dept: CALL CENTER | Facility: HOSPITAL | Age: 87
End: 2022-08-28

## 2022-08-28 NOTE — OUTREACH NOTE
COVID-19 Week 1 Survey    Flowsheet Row Responses   [facility] [place] patient discharged from? [facility]   Does the patient have one of the following disease processes/diagnoses(primary or secondary)? COVID-19   COVID-19 underlying condition? None   Call Number Call 2   Week 1 Call successful? Yes   Call start time 0929   Call end time 0932   Discharge diagnosis Acute hypoxemic respiratory failure due to COVID-19    Person spoke with today (if not patient) and relationship Rose Marie-daughter   Meds reviewed with patient/caregiver? Yes   Is the patient having any side effects they believe may be caused by any medication additions or changes? No   Does the patient have all medications ordered at discharge? Yes   Is the patient taking all medications as directed (includes completed medication regime)? Yes   Does the patient have a primary care provider?  Yes   Does the patient have an appointment with their PCP or specialist within 7 days of discharge? Yes   Has the patient kept scheduled appointments due by today? Yes   What is the Home health agency?  [facility]   Has home health visited the patient within 72 hours of discharge? Yes   What DME was ordered?  home  through [facility]    Has all DME been delivered? Yes   DME comments wears oxgen prn   Psychosocial issues? No   Did the patient receive a copy of their discharge instructions? Yes   Did the patient receive a copy of COVID-19 specific instructions? Yes   Nursing interventions Reviewed instructions with patient   What is the patient's perception of their health status since discharge? Improving   Does the patient have any of the following symptoms? None   Nursing Interventions Nurse provided patient education   Pulse Ox monitoring None   Is the patient/caregiver able to teach back steps to recovery at home? Set small, achievable goals for return to baseline health, Rest and rebuild strength, gradually increase activity, Eat a well-balance diet   Is the  patient/caregiver able to teach back the hierarchy of who to call/visit for symptoms/problems? PCP, Specialist, Home health nurse, Urgent Care, ED, 911 Yes   COVID-19 call completed? Yes          VALERIA LANIER - Registered Nurse

## 2022-09-06 ENCOUNTER — READMISSION MANAGEMENT (OUTPATIENT)
Dept: CALL CENTER | Facility: HOSPITAL | Age: 87
End: 2022-09-06

## 2022-09-06 NOTE — OUTREACH NOTE
COVID-19 Week 2 Survey    Flowsheet Row Responses   Hoahaoism facility patient discharged from? [facility]   Does the patient have one of the following disease processes/diagnoses(primary or secondary)? COVID-19   COVID-19 underlying condition? None   Call Number Call 1   COVID-19 Week 2: Call 1 attempt successful? No   Discharge diagnosis Acute hypoxemic respiratory failure due to COVID-19           TAMARA LANIER - Registered Nurse

## 2023-08-02 ENCOUNTER — OUTSIDE FACILITY SERVICE (OUTPATIENT)
Dept: CARDIOLOGY | Facility: CLINIC | Age: 88
End: 2023-08-02
Payer: MEDICARE

## 2023-08-02 PROCEDURE — 93018 CV STRESS TEST I&R ONLY: CPT | Performed by: INTERNAL MEDICINE

## 2023-08-02 PROCEDURE — 78452 HT MUSCLE IMAGE SPECT MULT: CPT | Performed by: INTERNAL MEDICINE

## 2024-03-30 NOTE — CASE MANAGEMENT/SOCIAL WORK
Continued Stay Note   [facility]     Patient Name: Shalom Alexis  MRN: [id number]  Today's Date: 8/19/2022    Admit Date: 8/9/2022     Discharge Plan     Row Name 08/19/22 1522       Plan    Plan D/C Plan: Home with family and [facility] (accepted, need order) Refuses SNF. Need 6 min walk.    Plan Comments PT eval recc SNF.  Per CM note earlier this week, daughter refused SNF. Potentila issues with discharge ride, may not qualify for ems, family in [facility].  Barriers to discharge: Nephrology following, monitor creatine.  Replace mag today.  Covid + Will need 6 min walk.              Phone communication or documentation only - no physical contact with patient or family.  Charisse Hughes RN      Office Phone [phone]  Office Cell [phone]     For information on Fall & Injury Prevention, visit: [email or web address] OR  [email or web address] OR  https://www.cdc.gov/steadi/patient.html